# Patient Record
Sex: MALE | Race: OTHER | HISPANIC OR LATINO | URBAN - METROPOLITAN AREA
[De-identification: names, ages, dates, MRNs, and addresses within clinical notes are randomized per-mention and may not be internally consistent; named-entity substitution may affect disease eponyms.]

---

## 2020-02-11 ENCOUNTER — EMERGENCY (EMERGENCY)
Facility: HOSPITAL | Age: 40
LOS: 1 days | Discharge: HOME | End: 2020-02-11
Attending: EMERGENCY MEDICINE | Admitting: EMERGENCY MEDICINE
Payer: SELF-PAY

## 2020-02-11 VITALS
RESPIRATION RATE: 16 BRPM | DIASTOLIC BLOOD PRESSURE: 75 MMHG | TEMPERATURE: 99 F | OXYGEN SATURATION: 96 % | HEART RATE: 87 BPM | SYSTOLIC BLOOD PRESSURE: 115 MMHG

## 2020-02-11 VITALS
SYSTOLIC BLOOD PRESSURE: 126 MMHG | RESPIRATION RATE: 18 BRPM | DIASTOLIC BLOOD PRESSURE: 80 MMHG | TEMPERATURE: 98 F | OXYGEN SATURATION: 99 % | HEART RATE: 76 BPM

## 2020-02-11 PROCEDURE — 99284 EMERGENCY DEPT VISIT MOD MDM: CPT

## 2020-02-11 PROCEDURE — 73080 X-RAY EXAM OF ELBOW: CPT | Mod: 26,RT

## 2020-02-11 PROCEDURE — 99053 MED SERV 10PM-8AM 24 HR FAC: CPT

## 2020-02-11 PROCEDURE — 70450 CT HEAD/BRAIN W/O DYE: CPT | Mod: 26

## 2020-02-11 RX ORDER — NICOTINE POLACRILEX 2 MG
1 GUM BUCCAL DAILY
Refills: 0 | Status: DISCONTINUED | OUTPATIENT
Start: 2020-02-11 | End: 2020-02-17

## 2020-02-11 RX ADMIN — Medication 1 PATCH: at 03:10
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
## 2020-02-11 NOTE — ED PROVIDER NOTE - CARE PLAN
Principal Discharge DX:	Closed head injury, initial encounter  Secondary Diagnosis:	Contusion of right elbow, initial encounter
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
## 2020-02-11 NOTE — ED PROVIDER NOTE - CLINICAL SUMMARY MEDICAL DECISION MAKING FREE TEXT BOX
pt with blunt trauma.  Reports drinking ETOH tonight.  Between that and the mechanism will get imaging of the head as well as the elbow.  Will provide a nicotine patch as well.  Will hold NSAIDs for pain until CT is completed.  If all negative will dispo back into the custody of the [facility].
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
## 2020-02-11 NOTE — ED PROVIDER NOTE - PHYSICAL EXAMINATION
Gen: Well appearing in NAD  Head: NC - there are a number of large contusions to the frontal head on the L as well as L occiput  Eyes: PERRL  Neck: trachea midline  Resp:  No distress  Ext: no deformities, R elbow is TTP with full ROM and good distal PMS  Neuro:  A&O appears non focal  Skin:  Warm and dry as visualized  Psych:  Normal affect and mood

## 2020-02-11 NOTE — ED PROVIDER NOTE - PATIENT PORTAL LINK FT
You can access the FollowMyHealth Patient Portal offered by [facility] by registering at the following website: [email or web address]. By joining [employer]’s FollowMyHealth portal, you will also be able to view your health information using other applications (apps) compatible with our system.
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
## 2020-05-20 ENCOUNTER — EMERGENCY (EMERGENCY)
Facility: HOSPITAL | Age: 40
LOS: 1 days | Discharge: HOME | End: 2020-05-20
Attending: EMERGENCY MEDICINE | Admitting: EMERGENCY MEDICINE
Payer: SELF-PAY

## 2020-05-20 VITALS
SYSTOLIC BLOOD PRESSURE: 148 MMHG | HEART RATE: 89 BPM | OXYGEN SATURATION: 100 % | DIASTOLIC BLOOD PRESSURE: 94 MMHG | TEMPERATURE: 99 F | RESPIRATION RATE: 16 BRPM

## 2020-05-20 PROBLEM — Z78.9 OTHER SPECIFIED HEALTH STATUS: Chronic | Status: ACTIVE | Noted: 2020-02-11

## 2020-05-20 LAB
ALBUMIN SERPL ELPH-MCNC: 4.1 G/DL — SIGNIFICANT CHANGE UP (ref 3.3–5)
ALP SERPL-CCNC: 156 U/L — HIGH (ref 40–120)
ALT FLD-CCNC: 68 U/L — HIGH (ref 4–41)
ANION GAP SERPL CALC-SCNC: 13 MMO/L — SIGNIFICANT CHANGE UP (ref 7–14)
APPEARANCE UR: CLEAR — SIGNIFICANT CHANGE UP
APTT BLD: 33.4 SEC — SIGNIFICANT CHANGE UP (ref 27.5–36.3)
AST SERPL-CCNC: 122 U/L — HIGH (ref 4–40)
BASE EXCESS BLDV CALC-SCNC: 3.3 MMOL/L — SIGNIFICANT CHANGE UP
BASOPHILS # BLD AUTO: 0.07 K/UL — SIGNIFICANT CHANGE UP (ref 0–0.2)
BASOPHILS NFR BLD AUTO: 1.3 % — SIGNIFICANT CHANGE UP (ref 0–2)
BILIRUB SERPL-MCNC: 0.4 MG/DL — SIGNIFICANT CHANGE UP (ref 0.2–1.2)
BILIRUB UR-MCNC: NEGATIVE — SIGNIFICANT CHANGE UP
BLOOD GAS VENOUS - CREATININE: 0.7 MG/DL — SIGNIFICANT CHANGE UP (ref 0.5–1.3)
BLOOD UR QL VISUAL: NEGATIVE — SIGNIFICANT CHANGE UP
BUN SERPL-MCNC: 9 MG/DL — SIGNIFICANT CHANGE UP (ref 7–23)
CALCIUM SERPL-MCNC: 9.2 MG/DL — SIGNIFICANT CHANGE UP (ref 8.4–10.5)
CHLORIDE BLDV-SCNC: 105 MMOL/L — SIGNIFICANT CHANGE UP (ref 96–108)
CHLORIDE SERPL-SCNC: 102 MMOL/L — SIGNIFICANT CHANGE UP (ref 98–107)
CO2 SERPL-SCNC: 26 MMOL/L — SIGNIFICANT CHANGE UP (ref 22–31)
COLOR SPEC: COLORLESS — SIGNIFICANT CHANGE UP
CREAT SERPL-MCNC: 0.69 MG/DL — SIGNIFICANT CHANGE UP (ref 0.5–1.3)
EOSINOPHIL # BLD AUTO: 0.06 K/UL — SIGNIFICANT CHANGE UP (ref 0–0.5)
EOSINOPHIL NFR BLD AUTO: 1.1 % — SIGNIFICANT CHANGE UP (ref 0–6)
GAS PNL BLDV: 141 MMOL/L — SIGNIFICANT CHANGE UP (ref 136–146)
GLUCOSE BLDV-MCNC: 87 MG/DL — SIGNIFICANT CHANGE UP (ref 70–99)
GLUCOSE SERPL-MCNC: 91 MG/DL — SIGNIFICANT CHANGE UP (ref 70–99)
GLUCOSE UR-MCNC: NEGATIVE — SIGNIFICANT CHANGE UP
HCO3 BLDV-SCNC: 26 MMOL/L — SIGNIFICANT CHANGE UP (ref 20–27)
HCT VFR BLD CALC: 34.7 % — LOW (ref 39–50)
HCT VFR BLDV CALC: 36.6 % — LOW (ref 39–51)
HGB BLD-MCNC: 11.7 G/DL — LOW (ref 13–17)
HGB BLDV-MCNC: 11.9 G/DL — LOW (ref 13–17)
IMM GRANULOCYTES NFR BLD AUTO: 1.8 % — HIGH (ref 0–1.5)
INR BLD: 0.93 — SIGNIFICANT CHANGE UP (ref 0.88–1.17)
KETONES UR-MCNC: NEGATIVE — SIGNIFICANT CHANGE UP
LACTATE BLDV-MCNC: 1.8 MMOL/L — SIGNIFICANT CHANGE UP (ref 0.5–2)
LEUKOCYTE ESTERASE UR-ACNC: NEGATIVE — SIGNIFICANT CHANGE UP
LYMPHOCYTES # BLD AUTO: 0.95 K/UL — LOW (ref 1–3.3)
LYMPHOCYTES # BLD AUTO: 17.4 % — SIGNIFICANT CHANGE UP (ref 13–44)
MCHC RBC-ENTMCNC: 33.7 % — SIGNIFICANT CHANGE UP (ref 32–36)
MCHC RBC-ENTMCNC: 34.2 PG — HIGH (ref 27–34)
MCV RBC AUTO: 101.5 FL — HIGH (ref 80–100)
MONOCYTES # BLD AUTO: 0.89 K/UL — SIGNIFICANT CHANGE UP (ref 0–0.9)
MONOCYTES NFR BLD AUTO: 16.3 % — HIGH (ref 2–14)
NEUTROPHILS # BLD AUTO: 3.38 K/UL — SIGNIFICANT CHANGE UP (ref 1.8–7.4)
NEUTROPHILS NFR BLD AUTO: 62.1 % — SIGNIFICANT CHANGE UP (ref 43–77)
NITRITE UR-MCNC: NEGATIVE — SIGNIFICANT CHANGE UP
NRBC # FLD: 0 K/UL — SIGNIFICANT CHANGE UP (ref 0–0)
PCO2 BLDV: 51 MMHG — SIGNIFICANT CHANGE UP (ref 41–51)
PH BLDV: 7.36 PH — SIGNIFICANT CHANGE UP (ref 7.32–7.43)
PH UR: 6.5 — SIGNIFICANT CHANGE UP (ref 5–8)
PLATELET # BLD AUTO: 186 K/UL — SIGNIFICANT CHANGE UP (ref 150–400)
PMV BLD: 9.4 FL — SIGNIFICANT CHANGE UP (ref 7–13)
PO2 BLDV: 43 MMHG — HIGH (ref 35–40)
POTASSIUM BLDV-SCNC: 3.7 MMOL/L — SIGNIFICANT CHANGE UP (ref 3.4–4.5)
POTASSIUM SERPL-MCNC: 3.8 MMOL/L — SIGNIFICANT CHANGE UP (ref 3.5–5.3)
POTASSIUM SERPL-SCNC: 3.8 MMOL/L — SIGNIFICANT CHANGE UP (ref 3.5–5.3)
PROT SERPL-MCNC: 7.5 G/DL — SIGNIFICANT CHANGE UP (ref 6–8.3)
PROT UR-MCNC: NEGATIVE — SIGNIFICANT CHANGE UP
PROTHROM AB SERPL-ACNC: 10.7 SEC — SIGNIFICANT CHANGE UP (ref 9.8–13.1)
RBC # BLD: 3.42 M/UL — LOW (ref 4.2–5.8)
RBC # FLD: 15 % — HIGH (ref 10.3–14.5)
SAO2 % BLDV: 73.3 % — SIGNIFICANT CHANGE UP (ref 60–85)
SODIUM SERPL-SCNC: 141 MMOL/L — SIGNIFICANT CHANGE UP (ref 135–145)
SP GR SPEC: 1.01 — SIGNIFICANT CHANGE UP (ref 1–1.04)
UROBILINOGEN FLD QL: NORMAL — SIGNIFICANT CHANGE UP
WBC # BLD: 5.45 K/UL — SIGNIFICANT CHANGE UP (ref 3.8–10.5)
WBC # FLD AUTO: 5.45 K/UL — SIGNIFICANT CHANGE UP (ref 3.8–10.5)

## 2020-05-20 PROCEDURE — 73590 X-RAY EXAM OF LOWER LEG: CPT | Mod: 26,50

## 2020-05-20 PROCEDURE — 99053 MED SERV 10PM-8AM 24 HR FAC: CPT

## 2020-05-20 PROCEDURE — 93010 ELECTROCARDIOGRAM REPORT: CPT

## 2020-05-20 PROCEDURE — 99285 EMERGENCY DEPT VISIT HI MDM: CPT | Mod: 25

## 2020-05-20 PROCEDURE — 93970 EXTREMITY STUDY: CPT | Mod: 26

## 2020-05-20 RX ORDER — SODIUM CHLORIDE 9 MG/ML
1000 INJECTION INTRAMUSCULAR; INTRAVENOUS; SUBCUTANEOUS ONCE
Refills: 0 | Status: COMPLETED | OUTPATIENT
Start: 2020-05-20 | End: 2020-05-20

## 2020-05-20 RX ORDER — KETOROLAC TROMETHAMINE 30 MG/ML
15 SYRINGE (ML) INJECTION ONCE
Refills: 0 | Status: DISCONTINUED | OUTPATIENT
Start: 2020-05-20 | End: 2020-05-20

## 2020-05-20 RX ADMIN — SODIUM CHLORIDE 1000 MILLILITER(S): 9 INJECTION INTRAMUSCULAR; INTRAVENOUS; SUBCUTANEOUS at 09:37

## 2020-05-20 RX ADMIN — Medication 15 MILLIGRAM(S): at 11:28

## 2020-05-20 RX ADMIN — Medication 100 MILLIGRAM(S): at 08:43

## 2020-05-20 NOTE — ED PROVIDER NOTE - NS ED ROS FT
CONSTITUTIONAL: No fevers, chills, fatigue, dizziness, lightheadedness weakness, unexpected weight change  HEENT: No loss of vision, double vision, blurry vision, nasal congestion, runny nose, sore throat  CV: No chest pain, palpitations  PULM: No cough, shortness of breath  GI: No abdominal pain, nausea, vomiting, diarrhea, constipation, bloody stools  SKIN: L/E SWELLING TO CALVES. No rashes  MSK: No muscle aches, joint pains  NEURO: PINS AND NEEDLES LE B/L  PSYCHIATRIC: Denies suicidal, homicidal ideations. No auditory, visual, tactile hallucinations.

## 2020-05-20 NOTE — ED PROVIDER NOTE - PATIENT PORTAL LINK FT
You can access the FollowMyHealth Patient Portal offered by [facility] by registering at the following website: [email or web address]. By joining [employer]’s FollowMyHealth portal, you will also be able to view your health information using other applications (apps) compatible with our system.

## 2020-05-20 NOTE — ED PROVIDER NOTE - CLINICAL SUMMARY MEDICAL DECISION MAKING FREE TEXT BOX
39 y.o. male undomiciled here for bilateral LE swelling with pins and needle sensation. Also, small pimple to chest wall. Vitals wnl. Exam with bilateral LE swelling, L > R with some yellowing of skin, red dots/bite marks?, no ulcers. No pain to passive or active flexion, nontender to palpation, intact pulses and sensation, patient able to ambulate, bear weight, FROM. Concern for DVT vs infection. Lower suspicion for compartment syndrome given exam. Possible swelling 2/2 exertion or renal/hepatic failure. Will check labs including lactate, cpk, get US LE, reassess. 39 y.o. male undomiciled here for bilateral LE swelling with pins and needle sensation. Also, 1x1cm nonfluctant, slightly raised papular mass on chest wall. Vitals wnl. Exam with bilateral LE swelling, L > R with some yellowing of skin, red dots/bite marks?, no ulcers. No pain to passive or active flexion, nontender to palpation, intact pulses and sensation, patient able to ambulate, bear weight, FROM. Concern for DVT vs infection. Lower suspicion for compartment syndrome given exam. Possible swelling 2/2 exertion or renal/hepatic failure. Will check labs including lactate, cpk, get US LE, reassess.

## 2020-05-20 NOTE — ED ADULT TRIAGE NOTE - CHIEF COMPLAINT QUOTE
Pt brought in for reported bilateral LE swelling that he noticed x2 days ago and also c/o a lump to mid chest causing him pain. Pt appears in no acute distress, vs as noted and pt ambulatory to triage. EKG to be completed.

## 2020-05-20 NOTE — ED PROVIDER NOTE - PROGRESS NOTE DETAILS
Brain Reid D.O., PGY1 (Resident)  Labs reassuring. Slightly elevated CPK. Will hydrate. No lactate, or elevated WBC. Patient afebrile. LFTs suggest an alcoholic hepatitis pattern. Patient w/o tenderness on abdominal exam. On rexamination of legs, physical exam unchanged. Intact pulses (femoral, popliteal, DP, PT 2+ bilateral, intact and symmetric sensation bilateral LE, no tenderness to palpation, no pain with passive or active flexion, skin well perfused. Pending UA to eval for myoglobin. If normal, it appears reasonable patient can follow up with telehealth/medicine clinic. US negative for above the knee DVT. Gastroc veins seen and are patent. At present time, it appears swelling is 2/2 prolonged walking. Less likely DVT, compartment syndrome. Based on overall picture, infection of LE seems unlikely. Will continue to monitor. Cabot: On reexam, the patient denies current tingling except to the soles of his feet, and sensation over the rest of the LEs is normal.  There is still no pain with passive flexion.  The calves feel more supple after the patient has been lying in bed.  Will check UA for myoglobin, but anticipate discharge with recommendation to elevate legs as much as possible for the next several days.

## 2020-05-20 NOTE — ED PROVIDER NOTE - PHYSICAL EXAMINATION
GENERAL: middle aged male, poor hygiene, ripped clothing, Vital signs are within normal limits  HEENT: NC/AT, conjunctiva noninjected and sclera anicteric, moist mucous membranes,  NECK: Supple, trachea midline  LUNG: CTAB, no w/r/r appreciated  CV: RRR, no m/r/g appreciated, Pulses- Radial: 2+ b/l; posterior tibial: 2+ b/l; dorsalis pedis: 2+ b/l  ABDOMEN: Soft, NTND, no rebound or guarding  BACK: No midline spinal tenderness or step-offs. No paraspinal tenderness to palpation  MSK: B/L LE EDEMA, PITTING 2+ (LEFT CALF SWELLING > RIGHT). No visible deformities, full range of motion UE/LE b/l, 5/5 muscle strength UE/LE b/l, nontender extremities  NEURO: AAOx4 (to person, place, time, event), sensation grossly intact, steady gait  SKIN: Warm, dry, well perfused. PSORIATIC RASH TO LEFT KNEE. 1x1cm pimple to chest wall. Red dots/bite marks? to left calf with some yellowing of skin.   PSYCH: Normal mood and affect GENERAL: middle aged male, poor hygiene, ripped clothing, Vital signs are within normal limits  HEENT: NC/AT, conjunctiva noninjected and sclera anicteric, moist mucous membranes,  NECK: Supple, trachea midline  LUNG: CTAB, no w/r/r appreciated  CV: RRR, no m/r/g appreciated, Pulses- Radial: 2+ b/l; posterior tibial: 2+ b/l; dorsalis pedis: 2+ b/l  ABDOMEN: Soft, NTND, no rebound or guarding  BACK: No midline spinal tenderness or step-offs. No paraspinal tenderness to palpation  MSK: B/L LE EDEMA, PITTING 2+ (LEFT CALF SWELLING > RIGHT). No visible deformities, full range of motion UE/LE b/l, 5/5 muscle strength UE/LE b/l, nontender extremities  NEURO: AAOx4 (to person, place, time, event), sensation grossly intact, steady gait  SKIN: Warm, dry, well perfused. PSORIATIC RASH TO LEFT KNEE. 1x1cm nonfluctant, slightly raised papular mass on chest wall. Red dots/bite marks? to left calf with some yellowing of skin.   PSYCH: Normal mood and affect

## 2020-05-20 NOTE — ED ADULT NURSE NOTE - OBJECTIVE STATEMENT
Pt reports a lot of walking over the past few days that has resulted in bilateral leg swelling and tightness. Pt also reports a "lump" to his chest that is painful.

## 2020-05-20 NOTE — ED PROVIDER NOTE - OBJECTIVE STATEMENT
39 y.o. male undomicied presenting to the ED for bilateral LE swelling and lump on the middle of his chest x2 days. Triage note states patient having chest pain but pain denies chest pain, SOB on my interview. Patient states lump feels more painful but he stretches his arms out to his sides. Denies bug bites or trauma. Denies fever, chills. Patient endorses he has been walking more than normal for the past 2 days. Endorses a pins and needle sensation on the bottom of his feet bilateral. Denies pain in the legs. No hx of liver, kidney disease, heart failure. Denies recent trauma to the LE. Denies any rashes. Current every day smoker, drinks 3, 12oz beers /day, denies IVDU. Smokes marijuana on occasion. 39 y.o. male undomiciled presenting to the ED for bilateral LE swelling and lump on the middle of his chest x2 days. Triage note states patient having chest pain but pain denies chest pain, SOB on my interview. Patient states lump feels more painful but he stretches his arms out to his sides. Denies bug bites or trauma. Denies fever, chills. Patient endorses he has been walking more than normal for the past 2 days. Endorses a pins and needle sensation on the bottom of his feet bilateral. Denies pain in the legs. No hx of liver, kidney disease, heart failure. Denies recent trauma to the LE. Denies any rashes. Current every day smoker, drinks 3, 12oz beers /day, denies IVDU. Smokes marijuana on occasion.

## 2020-05-20 NOTE — ED PROVIDER NOTE - ATTENDING CONTRIBUTION TO CARE
I, Jennifer Cabot, MD, have performed a history and physical exam of the patient and discussed their management with the resident. I reviewed the resident's note and agree with the documented findings and plan of care. My medical decision making and observations are found above.    Cabot: 39M with no known PMH other than ETOH abuse, undomiciled, here with 2d of BLE edema and tingling after ambulating all day and night.  He also reports a pimple to his sternum.  No F/C/N/V/D/urinary sx/CP/SOB/cough.  No prior PE/DVT, immob, cancers, travel in a car or plane.  On exam, HDS, NAD, MMM, eyes clear, lungs CTAB, heart sounds normal, abd soft, NT, ND, no CVAT, BLE taut, warm, edematous L>R, + 2+ DP pulses bilat, no pain with passive flexion, LLE with petechial rash up the shin area, no wounds to feet, no crepitus, neuro: alert and oriented, no focal deficits, SILT, full strength of BLE.  Likely dependent edema vs rhabdo.  Unlikely compartment syndrome given mild pain in general, no pain with passive stretch, full strength of BLE, no pallor.  Also unlikely DVT or necrotizing fascitis.  WIll monitor closely for change in symptoms while we check basic labs, CPK, LA, XR, doppler US. I, Jennifer Cabot, MD, have performed a history and physical exam of the patient and discussed their management with the resident. I reviewed the resident's note and agree with the documented findings and plan of care. My medical decision making and observations are found above.    Cabot: 39M with no known PMH other than ETOH abuse, undomiciled, here with 2d of BLE edema and tingling after ambulating all day and night.  He also reports a pimple to his sternum.  No F/C/N/V/D/urinary sx/CP/SOB/cough.  No prior PE/DVT, immob, cancers, travel in a car or plane.  On exam, HDS, NAD, MMM, eyes clear, lungs CTAB, heart sounds normal, abd soft, NT, ND, no CVAT, BLE taut, warm, edematous L>R, + 2+ DP pulses bilat, no pain with passive flexion, LLE with petechial rash up the shin area, no wounds to feet, no crepitus, + 1x1cm indurated area over sterum without fluctuance or discharge, neuro: alert and oriented, no focal deficits, SILT, full strength of BLE.  Likely dependent edema vs rhabdo.  Unlikely compartment syndrome given mild pain in general, no pain with passive stretch, full strength of BLE, no pallor.  Also unlikely DVT or necrotizing fascitis.  WIll monitor closely for change in symptoms while we check basic labs, CPK, LA, XR, doppler US.

## 2020-05-20 NOTE — ED PROVIDER NOTE - NSFOLLOWUPINSTRUCTIONS_ED_ALL_ED_FT
YOU WERE SEEN IN THE ED FOR: LOWER EXTREMITY SWELLING    KEEP YOUR FEET ELEVATED AND REST. TRY TO AVOID EXERTING YOURSELF TO ALLOW THE SWELLING TO HEAL AND GO DOWN.    FOR PAIN/FEVER, YOU MAY TAKE TYLENOL (acetaminophen) AND/OR IBUPROFEN (advil or motrin). FOLLOW THE INSTRUCTIONS ON THE LABEL/CONTAINER.    PLEASE FOLLOW UP WITH YOUR PRIVATE PHYSICIAN WITHIN THE NEXT 72 HOURS. BRING COPIES OF YOUR RESULTS.  -If you do not have a PMD, please call [phone] to find one convenient for you or call our clinic at [phone].    THE  SPOKE TO YOU ABOUT HOUSING.    RETURN TO THE EMERGENCY DEPARTMENT IF YOU EXPERIENCE ANY NEW/CONCERNING/WORSENING SYMPTOMS.  -worsening pain  -redness to the legs  -worsening swelling  -change in sensation to legs  -inability to walk

## 2020-05-28 ENCOUNTER — EMERGENCY (EMERGENCY)
Facility: HOSPITAL | Age: 40
LOS: 0 days | Discharge: HOME | End: 2020-05-28
Attending: EMERGENCY MEDICINE
Payer: MEDICAID

## 2020-05-28 VITALS
RESPIRATION RATE: 17 BRPM | HEART RATE: 88 BPM | WEIGHT: 169.98 LBS | OXYGEN SATURATION: 96 % | DIASTOLIC BLOOD PRESSURE: 58 MMHG | HEIGHT: 68 IN | TEMPERATURE: 97 F | SYSTOLIC BLOOD PRESSURE: 100 MMHG

## 2020-05-28 VITALS
RESPIRATION RATE: 18 BRPM | HEART RATE: 89 BPM | TEMPERATURE: 98 F | SYSTOLIC BLOOD PRESSURE: 106 MMHG | OXYGEN SATURATION: 100 % | DIASTOLIC BLOOD PRESSURE: 71 MMHG

## 2020-05-28 DIAGNOSIS — E87.6 HYPOKALEMIA: ICD-10-CM

## 2020-05-28 DIAGNOSIS — F10.129 ALCOHOL ABUSE WITH INTOXICATION, UNSPECIFIED: ICD-10-CM

## 2020-05-28 LAB
ALBUMIN SERPL ELPH-MCNC: 3.3 G/DL — SIGNIFICANT CHANGE UP (ref 3.3–5)
ALP SERPL-CCNC: 135 U/L — HIGH (ref 40–120)
ALT FLD-CCNC: 73 U/L — SIGNIFICANT CHANGE UP (ref 12–78)
ANION GAP SERPL CALC-SCNC: 10 MMOL/L — SIGNIFICANT CHANGE UP (ref 5–17)
ANISOCYTOSIS BLD QL: SLIGHT — SIGNIFICANT CHANGE UP
AST SERPL-CCNC: 73 U/L — HIGH (ref 15–37)
BASOPHILS # BLD AUTO: 0 K/UL — SIGNIFICANT CHANGE UP (ref 0–0.2)
BASOPHILS NFR BLD AUTO: 0 % — SIGNIFICANT CHANGE UP (ref 0–2)
BILIRUB SERPL-MCNC: 0.2 MG/DL — SIGNIFICANT CHANGE UP (ref 0.2–1.2)
BUN SERPL-MCNC: 6 MG/DL — LOW (ref 7–23)
CALCIUM SERPL-MCNC: 8.2 MG/DL — LOW (ref 8.5–10.1)
CHLORIDE SERPL-SCNC: 112 MMOL/L — HIGH (ref 96–108)
CO2 SERPL-SCNC: 25 MMOL/L — SIGNIFICANT CHANGE UP (ref 22–31)
CREAT SERPL-MCNC: 0.69 MG/DL — SIGNIFICANT CHANGE UP (ref 0.5–1.3)
EOSINOPHIL # BLD AUTO: 0 K/UL — SIGNIFICANT CHANGE UP (ref 0–0.5)
EOSINOPHIL NFR BLD AUTO: 0 % — SIGNIFICANT CHANGE UP (ref 0–6)
ETHANOL SERPL-MCNC: 375 MG/DL — HIGH (ref 0–10)
GLUCOSE BLDC GLUCOMTR-MCNC: 102 MG/DL — HIGH (ref 70–99)
GLUCOSE SERPL-MCNC: 94 MG/DL — SIGNIFICANT CHANGE UP (ref 70–99)
HCT VFR BLD CALC: 35.6 % — LOW (ref 39–50)
HGB BLD-MCNC: 11.8 G/DL — LOW (ref 13–17)
LYMPHOCYTES # BLD AUTO: 1.62 K/UL — SIGNIFICANT CHANGE UP (ref 1–3.3)
LYMPHOCYTES # BLD AUTO: 30 % — SIGNIFICANT CHANGE UP (ref 13–44)
MACROCYTES BLD QL: SLIGHT — SIGNIFICANT CHANGE UP
MAGNESIUM SERPL-MCNC: 2.5 MG/DL — SIGNIFICANT CHANGE UP (ref 1.6–2.6)
MANUAL SMEAR VERIFICATION: SIGNIFICANT CHANGE UP
MCHC RBC-ENTMCNC: 33.1 GM/DL — SIGNIFICANT CHANGE UP (ref 32–36)
MCHC RBC-ENTMCNC: 33.5 PG — SIGNIFICANT CHANGE UP (ref 27–34)
MCV RBC AUTO: 101.1 FL — HIGH (ref 80–100)
METAMYELOCYTES # FLD: 2 % — HIGH (ref 0–0)
MONOCYTES # BLD AUTO: 0.22 K/UL — SIGNIFICANT CHANGE UP (ref 0–0.9)
MONOCYTES NFR BLD AUTO: 4 % — SIGNIFICANT CHANGE UP (ref 2–14)
NEUTROPHILS # BLD AUTO: 3.46 K/UL — SIGNIFICANT CHANGE UP (ref 1.8–7.4)
NEUTROPHILS NFR BLD AUTO: 62 % — SIGNIFICANT CHANGE UP (ref 43–77)
NEUTS BAND # BLD: 2 % — SIGNIFICANT CHANGE UP (ref 0–8)
NRBC # BLD: 0 /100 — SIGNIFICANT CHANGE UP (ref 0–0)
NRBC # BLD: SIGNIFICANT CHANGE UP /100 WBCS (ref 0–0)
PLAT MORPH BLD: NORMAL — SIGNIFICANT CHANGE UP
PLATELET # BLD AUTO: 346 K/UL — SIGNIFICANT CHANGE UP (ref 150–400)
POTASSIUM SERPL-MCNC: 3.2 MMOL/L — LOW (ref 3.5–5.3)
POTASSIUM SERPL-SCNC: 3.2 MMOL/L — LOW (ref 3.5–5.3)
PROT SERPL-MCNC: 7.4 GM/DL — SIGNIFICANT CHANGE UP (ref 6–8.3)
RBC # BLD: 3.52 M/UL — LOW (ref 4.2–5.8)
RBC # FLD: 14.6 % — HIGH (ref 10.3–14.5)
RBC BLD AUTO: ABNORMAL
SODIUM SERPL-SCNC: 147 MMOL/L — HIGH (ref 135–145)
WBC # BLD: 5.4 K/UL — SIGNIFICANT CHANGE UP (ref 3.8–10.5)
WBC # FLD AUTO: 5.4 K/UL — SIGNIFICANT CHANGE UP (ref 3.8–10.5)

## 2020-05-28 RX ORDER — POTASSIUM CHLORIDE 20 MEQ
20 PACKET (EA) ORAL ONCE
Refills: 0 | Status: COMPLETED | OUTPATIENT
Start: 2020-05-28 | End: 2020-05-28

## 2020-05-28 RX ORDER — SODIUM CHLORIDE 9 MG/ML
1000 INJECTION INTRAMUSCULAR; INTRAVENOUS; SUBCUTANEOUS ONCE
Refills: 0 | Status: COMPLETED | OUTPATIENT
Start: 2020-05-28 | End: 2020-05-28

## 2020-05-28 RX ADMIN — Medication 50 MILLIEQUIVALENT(S): at 03:30

## 2020-05-28 RX ADMIN — SODIUM CHLORIDE 1000 MILLILITER(S): 9 INJECTION INTRAMUSCULAR; INTRAVENOUS; SUBCUTANEOUS at 03:00

## 2020-05-28 RX ADMIN — SODIUM CHLORIDE 1000 MILLILITER(S): 9 INJECTION INTRAMUSCULAR; INTRAVENOUS; SUBCUTANEOUS at 01:59

## 2020-05-28 NOTE — ED PROVIDER NOTE - NS ED ROS FT
No fever/chills, No photophobia/eye pain/changes in vision, No ear pain/sore throat/dysphagia, No chest pain/palpitations, no SOB/cough/wheeze/stridor, No abdominal pain, No N/V/D, no dysuria/frequency/discharge, No neck/back pain, no rash, no changes in neurological status/function. presumed etoh

## 2020-05-28 NOTE — ED ADULT NURSE NOTE - NSIMPLEMENTINTERV_GEN_ALL_ED
Implemented All Fall with Harm Risk Interventions:  [place] to call system. Call bell, personal items and telephone within reach. Instruct patient to call for assistance. Room bathroom lighting operational. Non-slip footwear when patient is off stretcher. Physically safe environment: no spills, clutter or unnecessary equipment. Stretcher in lowest position, wheels locked, appropriate side rails in place. Provide visual cue, wrist band, yellow gown, etc. Monitor gait and stability. Monitor for mental status changes and reorient to person, place, and time. Review medications for side effects contributing to fall risk. Reinforce activity limits and safety measures with patient and family. Provide visual clues: red socks.
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
## 2020-05-28 NOTE — ED PROVIDER NOTE - CARE PLAN
Principal Discharge DX:	Alcoholic intoxication without complication  Secondary Diagnosis:	Hypokalemia

## 2020-05-28 NOTE — ED PROVIDER NOTE - PHYSICAL EXAMINATION
Gen:s luggish, NAD, well appearing  Head: NC, AT, PERRL, EOMI, normal lids/conjunctiva  ENT: dry mucus membranes  Neck: +supple, +Trachea midline  Pulm: Bilateral BS, normal resp effort, no wheeze/stridor/retractions  CV: RRR, no M/R/G, +dist pulses  Abd: soft, NT/ND, Negative Bennett signs, +BS, no palpable masses  Mskel: no edema/erythema/cyanosis  Skin: no rash, warm/dry  Neuro: Unresponsive, no apparent sensory/motor deficits, coordination intact

## 2020-05-28 NOTE — ED PROVIDER NOTE - PATIENT PORTAL LINK FT
You can access the FollowMyHealth Patient Portal offered by [facility] by registering at the following website: [email or web address]. By joining [employer]’s FollowMyHealth portal, you will also be able to view your health information using other applications (apps) compatible with our system.
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
## 2020-06-28 ENCOUNTER — EMERGENCY (EMERGENCY)
Facility: HOSPITAL | Age: 40
LOS: 0 days | Discharge: HOME | End: 2020-06-28
Attending: STUDENT IN AN ORGANIZED HEALTH CARE EDUCATION/TRAINING PROGRAM
Payer: MEDICAID

## 2020-06-28 VITALS
OXYGEN SATURATION: 98 % | RESPIRATION RATE: 16 BRPM | SYSTOLIC BLOOD PRESSURE: 110 MMHG | TEMPERATURE: 98 F | HEART RATE: 87 BPM | DIASTOLIC BLOOD PRESSURE: 71 MMHG

## 2020-06-28 VITALS
DIASTOLIC BLOOD PRESSURE: 73 MMHG | WEIGHT: 149.91 LBS | HEART RATE: 110 BPM | HEIGHT: 69 IN | TEMPERATURE: 98 F | OXYGEN SATURATION: 96 % | RESPIRATION RATE: 18 BRPM | SYSTOLIC BLOOD PRESSURE: 113 MMHG

## 2020-06-28 DIAGNOSIS — F10.129 ALCOHOL ABUSE WITH INTOXICATION, UNSPECIFIED: ICD-10-CM

## 2020-06-28 PROBLEM — Z78.9 OTHER SPECIFIED HEALTH STATUS: Chronic | Status: ACTIVE | Noted: 2020-05-28

## 2020-06-28 LAB — GLUCOSE BLDC GLUCOMTR-MCNC: 96 MG/DL — SIGNIFICANT CHANGE UP (ref 70–99)

## 2020-06-28 NOTE — ED PROVIDER NOTE - PATIENT PORTAL LINK FT
You can access the FollowMyHealth Patient Portal offered by [facility] by registering at the following website: [email or web address]. By joining [employer]’s FollowMyHealth portal, you will also be able to view your health information using other applications (apps) compatible with our system.

## 2020-06-28 NOTE — ED ADULT NURSE NOTE - OBJECTIVE STATEMENT
Received patient in triage. BIBA,  intox,  found sitting on a lawn [place] the side of [place] by [facility].  daily beer drinker.  last drink about 11pm. Alert and speaking clearly.

## 2020-06-28 NOTE — ED PROVIDER NOTE - PHYSICAL EXAMINATION
VITAL SIGNS: I have reviewed nursing notes and confirm.   GEN: unkept, disheveled; in no acute distress. Speaking full sentences. Appearing intoxicated, but answering questions appropriately.  SKIN: Warm, no rash, no diaphoresis, no cyanosis, well perfused.   HEAD: Normocephalic; atraumatic. no scalp hematoma/lacerations.  NECK: Supple; non tender.   EYES: PERRL/EOMI 3mm reactive, conjunctiva and sclera clear.   ENT: No nasal discharge; airway clear.   CV: S1, S2 normal; no murmurs, gallops, or rubs. RRR. No LE pitting edema B/L. Cap refill < 2sec throughout. Distal pulses intact 2+ throughout.  RESP: CTA B/L, No wheezes, rales or rhonchi.   ABD: Normal bowel sounds; soft; ND; NT, no hepatosplenomegaly.   EXT: Normal ROM and moving all 4 x extremities. No joint or muscular pain throughout. No clubbing.  NEURO: Alert, oriented x 3 Grossly unremarkable. Sensory and motor intact throughout. No focal deficits. normal speech and coordination.   PSYCH: Cooperative, appropriate.

## 2020-06-28 NOTE — ED PROVIDER NOTE - OBJECTIVE STATEMENT
40M PMHx of ETOH use (daily, no hx of w/d seizures, no hx of DTs), homelessness presenting with ethanol intoxication. Drank 8 beers, today, last drink at 10PM. [place]. Denies any trauma, headaches, n/v, chest pain, sob, neck pain, abdominal pain, diarrhea/constipation, drug use, SI or HI. Reports he lost his home 4 months ago. Follows up at [facility] system for social work for assistance for shelter, however was interrupted by COVID-19 pandemic. Does not wish to receive assistance for a shelter from social work.

## 2020-06-28 NOTE — ED ADULT NURSE NOTE - CHIEF COMPLAINT QUOTE
BIBA,  intox,  found sitting [place] by [facility].  daily beer drinker.  last drink about 11pm

## 2020-06-28 NOTE — ED PROVIDER NOTE - CLINICAL SUMMARY MEDICAL DECISION MAKING FREE TEXT BOX
intoxicated, homelessness. FSG normal. A&Ox3  - No further diagnostics.  - Food/drink  - Shelter until AM, re-eval for clinical sobriety.

## 2020-06-28 NOTE — ED PROVIDER NOTE - NSFOLLOWUPINSTRUCTIONS_ED_ALL_ED_FT
Return to the emergency room if you experience worsening symptoms, fevers, vomiting, chest pain, headaches, difficulty breathing, abdominal pain, or new concerning symptoms.    Follow up with the [facility] system for further evaluation of your social situation.

## 2020-06-28 NOTE — ED ADULT TRIAGE NOTE - CHIEF COMPLAINT QUOTE
BIBA,  intox,  found sitting [place] a [place] by [facility].  daily beer drinker.  last drink about 11pm Hpi Title: Evaluation of Skin Lesions How Severe Are Your Spot(S)?: mild Have Your Spot(S) Been Treated In The Past?: has not been treated

## 2020-06-28 NOTE — ED PROVIDER NOTE - PROGRESS NOTE DETAILS
Patient is resting comfortably in stretcher, no complaints. Offered social work but does not want to go to a shelter. Patient agrees to stay until AM, will re-eval for clinical sobriety. The patient is clinically sober. The patient is alert and oriented x 3, is clear and coherent in conversation and has a normal gait and shows no signs of acute intoxication. The patient is safe for discharge.

## 2022-05-25 NOTE — ED ADULT NURSE NOTE - ALCOHOL PRE SCREEN (AUDIT - C)
Statement Selected Perilesional Excision Additional Text (Leave Blank If You Do Not Want): The margin was drawn around the clinically apparent lesion. Incisions were then made along these lines to the appropriate tissue plane and the lesion was extirpated.

## 2023-02-01 ENCOUNTER — INPATIENT (INPATIENT)
Facility: HOSPITAL | Age: 43
LOS: 1 days | Discharge: HOME | End: 2023-02-03
Attending: GENERAL ACUTE CARE HOSPITAL | Admitting: GENERAL ACUTE CARE HOSPITAL
Payer: MEDICAID

## 2023-02-01 VITALS
TEMPERATURE: 100 F | HEART RATE: 123 BPM | WEIGHT: 139.99 LBS | DIASTOLIC BLOOD PRESSURE: 81 MMHG | HEIGHT: 71 IN | RESPIRATION RATE: 14 BRPM | SYSTOLIC BLOOD PRESSURE: 134 MMHG | OXYGEN SATURATION: 98 %

## 2023-02-01 DIAGNOSIS — R26.81 UNSTEADINESS ON FEET: ICD-10-CM

## 2023-02-01 DIAGNOSIS — E86.0 DEHYDRATION: ICD-10-CM

## 2023-02-01 DIAGNOSIS — F10.939 ALCOHOL USE, UNSPECIFIED WITH WITHDRAWAL, UNSPECIFIED: ICD-10-CM

## 2023-02-01 DIAGNOSIS — N39.0 URINARY TRACT INFECTION, SITE NOT SPECIFIED: ICD-10-CM

## 2023-02-01 DIAGNOSIS — E87.8 OTHER DISORDERS OF ELECTROLYTE AND FLUID BALANCE, NOT ELSEWHERE CLASSIFIED: ICD-10-CM

## 2023-02-01 LAB
ALBUMIN SERPL ELPH-MCNC: 3.8 G/DL — SIGNIFICANT CHANGE UP (ref 3.3–5)
ALP SERPL-CCNC: 118 U/L — SIGNIFICANT CHANGE UP (ref 40–120)
ALT FLD-CCNC: 53 U/L — SIGNIFICANT CHANGE UP (ref 12–78)
AMPHET UR-MCNC: NEGATIVE — SIGNIFICANT CHANGE UP
ANION GAP SERPL CALC-SCNC: 15 MMOL/L — SIGNIFICANT CHANGE UP (ref 5–17)
APPEARANCE UR: ABNORMAL
APTT BLD: 29.5 SEC — SIGNIFICANT CHANGE UP (ref 27.5–35.5)
AST SERPL-CCNC: 105 U/L — HIGH (ref 15–37)
BACTERIA # UR AUTO: ABNORMAL
BARBITURATES UR SCN-MCNC: NEGATIVE — SIGNIFICANT CHANGE UP
BASOPHILS # BLD AUTO: 0.02 K/UL — SIGNIFICANT CHANGE UP (ref 0–0.2)
BASOPHILS NFR BLD AUTO: 0.4 % — SIGNIFICANT CHANGE UP (ref 0–2)
BENZODIAZ UR-MCNC: NEGATIVE — SIGNIFICANT CHANGE UP
BILIRUB SERPL-MCNC: 1.3 MG/DL — HIGH (ref 0.2–1.2)
BILIRUB UR-MCNC: ABNORMAL
BUN SERPL-MCNC: 14 MG/DL — SIGNIFICANT CHANGE UP (ref 7–23)
CALCIUM SERPL-MCNC: 9.3 MG/DL — SIGNIFICANT CHANGE UP (ref 8.5–10.1)
CHLORIDE SERPL-SCNC: 97 MMOL/L — SIGNIFICANT CHANGE UP (ref 96–108)
CO2 SERPL-SCNC: 24 MMOL/L — SIGNIFICANT CHANGE UP (ref 22–31)
COCAINE METAB.OTHER UR-MCNC: NEGATIVE — SIGNIFICANT CHANGE UP
COLOR SPEC: ABNORMAL
CREAT SERPL-MCNC: 0.99 MG/DL — SIGNIFICANT CHANGE UP (ref 0.5–1.3)
DIFF PNL FLD: ABNORMAL
EGFR: 96 ML/MIN/1.73M2 — SIGNIFICANT CHANGE UP
EOSINOPHIL # BLD AUTO: 0 K/UL — SIGNIFICANT CHANGE UP (ref 0–0.5)
EOSINOPHIL NFR BLD AUTO: 0 % — SIGNIFICANT CHANGE UP (ref 0–6)
EPI CELLS # UR: SIGNIFICANT CHANGE UP
ETHANOL SERPL-MCNC: <10 MG/DL — SIGNIFICANT CHANGE UP (ref 0–10)
FLUAV AG NPH QL: SIGNIFICANT CHANGE UP
FLUBV AG NPH QL: SIGNIFICANT CHANGE UP
GLUCOSE BLDC GLUCOMTR-MCNC: 145 MG/DL — HIGH (ref 70–99)
GLUCOSE SERPL-MCNC: 106 MG/DL — HIGH (ref 70–99)
GLUCOSE UR QL: NEGATIVE MG/DL — SIGNIFICANT CHANGE UP
HCT VFR BLD CALC: 39 % — SIGNIFICANT CHANGE UP (ref 39–50)
HGB BLD-MCNC: 13.2 G/DL — SIGNIFICANT CHANGE UP (ref 13–17)
IMM GRANULOCYTES NFR BLD AUTO: 0.4 % — SIGNIFICANT CHANGE UP (ref 0–0.9)
INR BLD: 1.05 RATIO — SIGNIFICANT CHANGE UP (ref 0.88–1.16)
KETONES UR-MCNC: ABNORMAL
LEUKOCYTE ESTERASE UR-ACNC: ABNORMAL
LYMPHOCYTES # BLD AUTO: 0.24 K/UL — LOW (ref 1–3.3)
LYMPHOCYTES # BLD AUTO: 4.5 % — LOW (ref 13–44)
MAGNESIUM SERPL-MCNC: 1.5 MG/DL — LOW (ref 1.6–2.6)
MANUAL SMEAR VERIFICATION: YES — SIGNIFICANT CHANGE UP
MCHC RBC-ENTMCNC: 33.1 PG — SIGNIFICANT CHANGE UP (ref 27–34)
MCHC RBC-ENTMCNC: 33.8 G/DL — SIGNIFICANT CHANGE UP (ref 32–36)
MCV RBC AUTO: 97.7 FL — SIGNIFICANT CHANGE UP (ref 80–100)
METHADONE UR-MCNC: NEGATIVE — SIGNIFICANT CHANGE UP
MONOCYTES # BLD AUTO: 0.49 K/UL — SIGNIFICANT CHANGE UP (ref 0–0.9)
MONOCYTES NFR BLD AUTO: 9.2 % — SIGNIFICANT CHANGE UP (ref 2–14)
NEUTROPHILS # BLD AUTO: 4.57 K/UL — SIGNIFICANT CHANGE UP (ref 1.8–7.4)
NEUTROPHILS NFR BLD AUTO: 85.5 % — HIGH (ref 43–77)
NITRITE UR-MCNC: NEGATIVE — SIGNIFICANT CHANGE UP
NRBC # BLD: 0 /100 WBCS — SIGNIFICANT CHANGE UP (ref 0–0)
OPIATES UR-MCNC: NEGATIVE — SIGNIFICANT CHANGE UP
PCP SPEC-MCNC: SIGNIFICANT CHANGE UP
PCP UR-MCNC: NEGATIVE — SIGNIFICANT CHANGE UP
PH UR: 5 — SIGNIFICANT CHANGE UP (ref 5–8)
PHOSPHATE SERPL-MCNC: 1.9 MG/DL — LOW (ref 2.5–4.5)
PLAT MORPH BLD: NORMAL — SIGNIFICANT CHANGE UP
PLATELET # BLD AUTO: 90 K/UL — LOW (ref 150–400)
POTASSIUM SERPL-MCNC: 3.4 MMOL/L — LOW (ref 3.5–5.3)
POTASSIUM SERPL-SCNC: 3.4 MMOL/L — LOW (ref 3.5–5.3)
PROT SERPL-MCNC: 8.2 GM/DL — SIGNIFICANT CHANGE UP (ref 6–8.3)
PROT UR-MCNC: 500 MG/DL
PROTHROM AB SERPL-ACNC: 12.6 SEC — SIGNIFICANT CHANGE UP (ref 10.5–13.4)
RBC # BLD: 3.99 M/UL — LOW (ref 4.2–5.8)
RBC # FLD: 13.7 % — SIGNIFICANT CHANGE UP (ref 10.3–14.5)
RBC BLD AUTO: NORMAL — SIGNIFICANT CHANGE UP
RBC CASTS # UR COMP ASSIST: ABNORMAL /HPF (ref 0–4)
SARS-COV-2 RNA SPEC QL NAA+PROBE: SIGNIFICANT CHANGE UP
SODIUM SERPL-SCNC: 136 MMOL/L — SIGNIFICANT CHANGE UP (ref 135–145)
SP GR SPEC: 1.02 — SIGNIFICANT CHANGE UP (ref 1.01–1.02)
THC UR QL: POSITIVE — SIGNIFICANT CHANGE UP
TROPONIN I, HIGH SENSITIVITY RESULT: 6.6 NG/L — SIGNIFICANT CHANGE UP
UROBILINOGEN FLD QL: 4 MG/DL
WBC # BLD: 5.34 K/UL — SIGNIFICANT CHANGE UP (ref 3.8–10.5)
WBC # FLD AUTO: 5.34 K/UL — SIGNIFICANT CHANGE UP (ref 3.8–10.5)
WBC UR QL: >50

## 2023-02-01 RX ORDER — POTASSIUM CHLORIDE 20 MEQ
20 PACKET (EA) ORAL ONCE
Refills: 0 | Status: COMPLETED | OUTPATIENT
Start: 2023-02-01 | End: 2023-02-01

## 2023-02-01 RX ORDER — ACETAMINOPHEN 500 MG
650 TABLET ORAL EVERY 6 HOURS
Refills: 0 | Status: DISCONTINUED | OUTPATIENT
Start: 2023-02-01 | End: 2023-02-03

## 2023-02-01 RX ORDER — SODIUM CHLORIDE 9 MG/ML
1000 INJECTION, SOLUTION INTRAVENOUS
Refills: 0 | Status: DISCONTINUED | OUTPATIENT
Start: 2023-02-01 | End: 2023-02-03

## 2023-02-01 RX ORDER — FOLIC ACID 0.8 MG
1 TABLET ORAL DAILY
Refills: 0 | Status: DISCONTINUED | OUTPATIENT
Start: 2023-02-01 | End: 2023-02-03

## 2023-02-01 RX ORDER — LANOLIN ALCOHOL/MO/W.PET/CERES
3 CREAM (GRAM) TOPICAL AT BEDTIME
Refills: 0 | Status: DISCONTINUED | OUTPATIENT
Start: 2023-02-01 | End: 2023-02-03

## 2023-02-01 RX ORDER — MAGNESIUM SULFATE 500 MG/ML
1 VIAL (ML) INJECTION ONCE
Refills: 0 | Status: COMPLETED | OUTPATIENT
Start: 2023-02-01 | End: 2023-02-01

## 2023-02-01 RX ORDER — CEFTRIAXONE 500 MG/1
1000 INJECTION, POWDER, FOR SOLUTION INTRAMUSCULAR; INTRAVENOUS ONCE
Refills: 0 | Status: COMPLETED | OUTPATIENT
Start: 2023-02-01 | End: 2023-02-01

## 2023-02-01 RX ORDER — POTASSIUM PHOSPHATE, MONOBASIC POTASSIUM PHOSPHATE, DIBASIC 236; 224 MG/ML; MG/ML
15 INJECTION, SOLUTION INTRAVENOUS ONCE
Refills: 0 | Status: COMPLETED | OUTPATIENT
Start: 2023-02-01 | End: 2023-02-01

## 2023-02-01 RX ORDER — CEFTRIAXONE 500 MG/1
1000 INJECTION, POWDER, FOR SOLUTION INTRAMUSCULAR; INTRAVENOUS EVERY 24 HOURS
Refills: 0 | Status: DISCONTINUED | OUTPATIENT
Start: 2023-02-02 | End: 2023-02-03

## 2023-02-01 RX ORDER — THIAMINE MONONITRATE (VIT B1) 100 MG
100 TABLET ORAL ONCE
Refills: 0 | Status: COMPLETED | OUTPATIENT
Start: 2023-02-01 | End: 2023-02-01

## 2023-02-01 RX ORDER — SODIUM,POTASSIUM PHOSPHATES 278-250MG
2 POWDER IN PACKET (EA) ORAL ONCE
Refills: 0 | Status: COMPLETED | OUTPATIENT
Start: 2023-02-01 | End: 2023-02-01

## 2023-02-01 RX ORDER — SODIUM CHLORIDE 9 MG/ML
1000 INJECTION INTRAMUSCULAR; INTRAVENOUS; SUBCUTANEOUS ONCE
Refills: 0 | Status: COMPLETED | OUTPATIENT
Start: 2023-02-01 | End: 2023-02-01

## 2023-02-01 RX ORDER — THIAMINE MONONITRATE (VIT B1) 100 MG
100 TABLET ORAL DAILY
Refills: 0 | Status: DISCONTINUED | OUTPATIENT
Start: 2023-02-02 | End: 2023-02-03

## 2023-02-01 RX ADMIN — Medication 2 MILLIGRAM(S): at 14:55

## 2023-02-01 RX ADMIN — POTASSIUM PHOSPHATE, MONOBASIC POTASSIUM PHOSPHATE, DIBASIC 62.5 MILLIMOLE(S): 236; 224 INJECTION, SOLUTION INTRAVENOUS at 17:29

## 2023-02-01 RX ADMIN — Medication 1 MILLIGRAM(S): at 14:01

## 2023-02-01 RX ADMIN — SODIUM CHLORIDE 125 MILLILITER(S): 9 INJECTION, SOLUTION INTRAVENOUS at 17:30

## 2023-02-01 RX ADMIN — Medication 100 GRAM(S): at 11:49

## 2023-02-01 RX ADMIN — Medication 100 MILLIGRAM(S): at 14:01

## 2023-02-01 RX ADMIN — Medication 20 MILLIEQUIVALENT(S): at 11:49

## 2023-02-01 RX ADMIN — SODIUM CHLORIDE 1000 MILLILITER(S): 9 INJECTION INTRAMUSCULAR; INTRAVENOUS; SUBCUTANEOUS at 10:23

## 2023-02-01 RX ADMIN — Medication 2 MILLIGRAM(S): at 20:28

## 2023-02-01 RX ADMIN — Medication 2 PACKET(S): at 12:05

## 2023-02-01 RX ADMIN — Medication 50 MILLIGRAM(S): at 10:23

## 2023-02-01 RX ADMIN — CEFTRIAXONE 100 MILLIGRAM(S): 500 INJECTION, POWDER, FOR SOLUTION INTRAMUSCULAR; INTRAVENOUS at 11:49

## 2023-02-01 RX ADMIN — Medication 2 MILLIGRAM(S): at 23:22

## 2023-02-01 RX ADMIN — SODIUM CHLORIDE 125 MILLILITER(S): 9 INJECTION, SOLUTION INTRAVENOUS at 16:44

## 2023-02-01 RX ADMIN — Medication 3 MILLIGRAM(S): at 23:23

## 2023-02-01 NOTE — ED PROVIDER NOTE - PHYSICAL EXAMINATION
GEN: Awake, alert, interactive, NAD.   HEAD AND NECK: NC/AT. Airway patent. Neck supple.   EYES:  Clear b/l. EOMI. PERRL.   ENT: Moist mucus membranes.   CARDIAC: Regular rate, regular rhythm. No evident pedal edema.    RESP/CHEST: Normal respiratory effort with no use of accessory muscles or retractions. Clear throughout on auscultation.  ABD: soft, non-distended, non-tender. No rebound, no guarding.   BACK: No midline spinal TTP. No CVAT.   EXTREMITIES: Bilateral foot: No deformity, (+) dry, scaly, (+) mild generalized tenderness, no edema, (+) pulses intact.  Moving all extremities with no apparent deformities.   SKIN: Warm, dry, intact normal color. No rash.   NEURO: AOx3, CN II-XII grossly intact, no focal deficits. (+) mild tremors.   PSYCH: Appropriate mood and affect.

## 2023-02-01 NOTE — ED PROVIDER NOTE - NS ED ATTENDING STATEMENT MOD
This was a shared visit with the AMARJIT. I reviewed and verified the documentation and independently performed the documented:

## 2023-02-01 NOTE — ED ADULT TRIAGE NOTE - TEMPERATURE IN FAHRENHEIT (DEGREES F)
Health Maintenance Due   Topic Date Due   • DTaP/Tdap/Td Vaccine (1 - Tdap) 02/07/1942   • Shingles Vaccine (2 of 3) 10/05/2009   • DM/CKD Microalbumin  04/04/2018   • Medicare Wellness 65+  10/22/2020       Patient is due for topics as listed above but is not proceeding with Immunization(s) Dtap/Tdap/Td and Shingles, DM/CKD Microalbumin and MWV (Medicare Wellness Visit) at this time. Education provided for Immunization(s) Dtap/Tdap/Td and Shingles, DM/CKD Microalbumin and MWV (Medicare Wellness Visit).         99.7
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
## 2023-02-01 NOTE — ED ADULT NURSE NOTE - NSIMPLEMENTINTERV_GEN_ALL_ED
Implemented All Fall Risk Interventions:  [place] to call system. Call bell, personal items and telephone within reach. Instruct patient to call for assistance. Room bathroom lighting operational. Non-slip footwear when patient is off stretcher. Physically safe environment: no spills, clutter or unnecessary equipment. Stretcher in lowest position, wheels locked, appropriate side rails in place. Provide visual cue, wrist band, yellow gown, etc. Monitor gait and stability. Monitor for mental status changes and reorient to person, place, and time. Review medications for side effects contributing to fall risk. Reinforce activity limits and safety measures with patient and family.

## 2023-02-01 NOTE — H&P ADULT - PROBLEM SELECTOR PLAN 3
Tachycardic, slight tremors  Low CIWA score at this moment  Will start with symptoms trigger oral ativan CIWA protocol  Monitor for DT   thiamine, folic acid, multivitamin  S/W consult- patient is homeless

## 2023-02-01 NOTE — H&P ADULT - ASSESSMENT
42 years old male with h/o alcohol use present to ED with ? syncope. Patient reported unsteady gait, fell and hit his head. Denied any dizziness or LOC but per triage note, had witness syncope. Unreliable history of alcohol use. Patient reported only a few beer a week but with multiple ED visit in 2020 with alcohol intoxication with high blood alcohol level  Tachycardic to 123, afebrile, sat well at RA. No leukocytosis, Plt 90, K 3.4, Mg 1.5, phosphorus 1.9, Cr 0.99, hsTnT 6.6. UA appear dirty, Utox positive for THC, serum alcohol < 10. Flu/COVID negative. CXR image reviewed, no focal consolidation. CT head with no acute pathology    Admitted with unsteady gait, electrolytes disorder,

## 2023-02-01 NOTE — SBIRT NOTE ADULT - NSSBIRTNALRESKIT_GEN_A_CORE
Naloxone Rescue Kit dispensed: VS-390 & VS-389, exp 07/25. Pt was educated about Naloxone and trained on how to utilize the kit./Educated/Dispensed

## 2023-02-01 NOTE — ED ADULT TRIAGE NOTE - CHIEF COMPLAINT QUOTE
BIBA for witnessed syncope inside the deli. Patient is unable to remember incident. AxOx4 in triage. Denies any alcohol or substance use. No facial droop or slurred speech noted. Active ROM on all extremities. BIBA for witnessed syncope inside the deli. Patient is unable to remember incident. AxOx4 in triage. Denies any alcohol or substance use. No facial droop or slurred speech noted. Active ROM on all extremities.  in triage.

## 2023-02-01 NOTE — SBIRT NOTE ADULT - NSSBIRTBRIEFINTDET_GEN_A_CORE
Provided SBIRT services: Full screen positive. Brief Intervention Performed. Screening results were reviewed with the patient and patient was provided information about healthy guidelines and potential negative consequences associated with level of risk. Motivation and readiness to reduce or stop use was discussed and goals and activities to make changes were suggested/offered. Patient reports last drink Sunday.

## 2023-02-01 NOTE — SBIRT NOTE ADULT - NSSBIRTDRGBRIEFINTDET_GEN_A_CORE
Provided SBIRT services: Full screen positive. Pt reports marijuana use occasionally. Brief Intervention Performed. Screening results were reviewed with the patient and patient was provided information about healthy guidelines and potential negative consequences associated with level of risk. Motivation and readiness to reduce or stop use was discussed and goals and activities to make changes were suggested/offered. Last use beginning of January. Reports he does not want to continue use.    Pt reports tobacco use. Approx. a pack weekly. States last use over a week ago. Tobacco cessation information provided to pt- pt interested in NRT (gum).

## 2023-02-01 NOTE — H&P ADULT - HISTORY OF PRESENT ILLNESS
42 years old male with h/o alcohol use present to ED with ? syncope. Patient reported unsteady gait, fell and hit his head. Denied any dizzinezz or LOC but per triage note, had witness syncope. Unreliable history of alcohol use. Patient reported only a few beer a week but with multiple ED visit in 2020 with alcohol intoxication with high blood alcohol level  Tachycardic to 123, afebrile, sat well at RA. No leukocytosis, Plt 90, K 3.4, Mg 1.5, phosphorus 1.9, Cr 0.99, hsTnT 6.6. UA appear dirty, Utox positive for THC, serum alcohol < 10. Flu/COVID negative. CXR image reviewed, no focal consolidation. CT head with no acute pathology    SH: alcohol use, cannabis use  FH: no family h/o HTN, DM

## 2023-02-01 NOTE — ED ADULT NURSE NOTE - CHIEF COMPLAINT QUOTE
BIBA for witnessed syncope inside the deli. Patient is unable to remember incident. AxOx4 in triage. Denies any alcohol or substance use. No facial droop or slurred speech noted. Active ROM on all extremities.  in triage.
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
## 2023-02-01 NOTE — H&P ADULT - PROBLEM SELECTOR PLAN 1
K 3.4, Mg 1.5, phosphorus 1.9  Replace with KCL, Mg sulfate, IV K phos  Monitor and replace serum electrolytes

## 2023-02-01 NOTE — H&P ADULT - PROBLEM SELECTOR PLAN 2
Reported unsteady gait for several months  Slight dysmetria in exam  CT head with no acute pathology  Will get MRI brain  Check B12, folate  continue thiamine  PT evaluation

## 2023-02-01 NOTE — H&P ADULT - NSHPPHYSICALEXAM_GEN_ALL_CORE
CONSTITUTIONAL: alert and cooperative, no acute distress  EYES: PERRL,  no scleral icterus, bruised noted on right eye  ENT: Mucosa moist, tongue normal.   NECK: Neck supple, trachea midline, non-tender  CARDIAC: Normal S1 and S2. Regular rate and rhythms. No Pedal edema. Peripheral pulses intact  LUNGS: Equal air entry both lungs. No rales, rhonchi, wheezing. Normal respiratory effort.   ABDOMEN: Soft, nondistended, nontender. No guarding or rebound tenderness. No hepatomegaly or splenomegaly. Bowel sound normal.   MUSCULOSKELETAL: Normocephalic, atraumatic.  No significant deformity or joint abnormality. No varicosities.  NEUROLOGICAL: No gross motor or sensory deficits. Appear to have tremors. Slight dysmetria- suspect due to tremors  SKIN: no lesions or eruptions. Decrease turgor  PSYCHIATRIC: A&O x 3, appropriate mood and affect

## 2023-02-01 NOTE — ED PROVIDER NOTE - ATTENDING APP SHARED VISIT CONTRIBUTION OF CARE
I have seen and evaluated the patient and agree with the PAs note as documented. I discussed the case with the PA including diagnosis, treatment plan, and admission for inability to ambulate due to etoh w/d and electrolyte abnormalities due to alcohol abuse.

## 2023-02-02 LAB
ALBUMIN SERPL ELPH-MCNC: 3.4 G/DL — SIGNIFICANT CHANGE UP (ref 3.3–5)
ALP SERPL-CCNC: 110 U/L — SIGNIFICANT CHANGE UP (ref 40–120)
ALT FLD-CCNC: 42 U/L — SIGNIFICANT CHANGE UP (ref 12–78)
ANION GAP SERPL CALC-SCNC: 9 MMOL/L — SIGNIFICANT CHANGE UP (ref 5–17)
AST SERPL-CCNC: 80 U/L — HIGH (ref 15–37)
BILIRUB SERPL-MCNC: 0.9 MG/DL — SIGNIFICANT CHANGE UP (ref 0.2–1.2)
BUN SERPL-MCNC: 14 MG/DL — SIGNIFICANT CHANGE UP (ref 7–23)
CALCIUM SERPL-MCNC: 8.8 MG/DL — SIGNIFICANT CHANGE UP (ref 8.5–10.1)
CHLORIDE SERPL-SCNC: 103 MMOL/L — SIGNIFICANT CHANGE UP (ref 96–108)
CO2 SERPL-SCNC: 26 MMOL/L — SIGNIFICANT CHANGE UP (ref 22–31)
CREAT SERPL-MCNC: 0.69 MG/DL — SIGNIFICANT CHANGE UP (ref 0.5–1.3)
EGFR: 117 ML/MIN/1.73M2 — SIGNIFICANT CHANGE UP
FOLATE SERPL-MCNC: 13.9 NG/ML — SIGNIFICANT CHANGE UP
GLUCOSE SERPL-MCNC: 87 MG/DL — SIGNIFICANT CHANGE UP (ref 70–99)
HCT VFR BLD CALC: 37.9 % — LOW (ref 39–50)
HGB BLD-MCNC: 12.5 G/DL — LOW (ref 13–17)
MAGNESIUM SERPL-MCNC: 1.9 MG/DL — SIGNIFICANT CHANGE UP (ref 1.6–2.6)
MCHC RBC-ENTMCNC: 32.8 PG — SIGNIFICANT CHANGE UP (ref 27–34)
MCHC RBC-ENTMCNC: 33 G/DL — SIGNIFICANT CHANGE UP (ref 32–36)
MCV RBC AUTO: 99.5 FL — SIGNIFICANT CHANGE UP (ref 80–100)
NRBC # BLD: 0 /100 WBCS — SIGNIFICANT CHANGE UP (ref 0–0)
PHOSPHATE SERPL-MCNC: 2.7 MG/DL — SIGNIFICANT CHANGE UP (ref 2.5–4.5)
PHOSPHATE SERPL-MCNC: 3.3 MG/DL — SIGNIFICANT CHANGE UP (ref 2.5–4.5)
PLATELET # BLD AUTO: 97 K/UL — LOW (ref 150–400)
POTASSIUM SERPL-MCNC: 3.4 MMOL/L — LOW (ref 3.5–5.3)
POTASSIUM SERPL-SCNC: 3.4 MMOL/L — LOW (ref 3.5–5.3)
PROT SERPL-MCNC: 7.6 GM/DL — SIGNIFICANT CHANGE UP (ref 6–8.3)
RBC # BLD: 3.81 M/UL — LOW (ref 4.2–5.8)
RBC # FLD: 13.4 % — SIGNIFICANT CHANGE UP (ref 10.3–14.5)
SODIUM SERPL-SCNC: 138 MMOL/L — SIGNIFICANT CHANGE UP (ref 135–145)
T4 FREE SERPL-MCNC: 1.1 NG/DL — SIGNIFICANT CHANGE UP (ref 0.9–1.8)
TSH SERPL-MCNC: 2.03 UIU/ML — SIGNIFICANT CHANGE UP (ref 0.36–3.74)
VIT B12 SERPL-MCNC: 891 PG/ML — SIGNIFICANT CHANGE UP (ref 232–1245)
WBC # BLD: 4.83 K/UL — SIGNIFICANT CHANGE UP (ref 3.8–10.5)
WBC # FLD AUTO: 4.83 K/UL — SIGNIFICANT CHANGE UP (ref 3.8–10.5)

## 2023-02-02 RX ORDER — INFLUENZA VIRUS VACCINE 15; 15; 15; 15 UG/.5ML; UG/.5ML; UG/.5ML; UG/.5ML
0.5 SUSPENSION INTRAMUSCULAR ONCE
Refills: 0 | Status: DISCONTINUED | OUTPATIENT
Start: 2023-02-02 | End: 2023-02-03

## 2023-02-02 RX ADMIN — Medication 1 MILLIGRAM(S): at 11:28

## 2023-02-02 RX ADMIN — Medication 50 MILLIGRAM(S): at 21:51

## 2023-02-02 RX ADMIN — SODIUM CHLORIDE 75 MILLILITER(S): 9 INJECTION, SOLUTION INTRAVENOUS at 17:36

## 2023-02-02 RX ADMIN — Medication 50 MILLIGRAM(S): at 11:27

## 2023-02-02 RX ADMIN — Medication 50 MILLIGRAM(S): at 00:23

## 2023-02-02 RX ADMIN — Medication 50 MILLIGRAM(S): at 05:14

## 2023-02-02 RX ADMIN — CEFTRIAXONE 100 MILLIGRAM(S): 500 INJECTION, POWDER, FOR SOLUTION INTRAMUSCULAR; INTRAVENOUS at 15:06

## 2023-02-02 RX ADMIN — Medication 50 MILLIGRAM(S): at 15:06

## 2023-02-02 RX ADMIN — Medication 4 MILLIGRAM(S): at 00:07

## 2023-02-02 RX ADMIN — Medication 50 MILLIGRAM(S): at 17:31

## 2023-02-02 RX ADMIN — Medication 100 MILLIGRAM(S): at 11:27

## 2023-02-02 NOTE — PHYSICAL THERAPY INITIAL EVALUATION ADULT - PERTINENT HX OF CURRENT PROBLEM, REHAB EVAL
Pt is a 42-y/o, male admitted to [facility] due to syncope. As per chart, pt with unsteady gait and fell and hit his head. Of note, pt had multiple ED admissions due to alcoholic intoxication. Pt now referred to PT for functional assessment.

## 2023-02-02 NOTE — PATIENT PROFILE ADULT - FALL HARM RISK - HARM RISK INTERVENTIONS

## 2023-02-02 NOTE — ED ADULT NURSE REASSESSMENT NOTE - NS ED NURSE REASSESS COMMENT FT1
Received patient aox3  breathing on room air saturation 98% on room air attached to cardiac monitor shows nsr with heart rate  of 74 beats per min . Denies any discomfort at this time / No tremors noted .
MD Chambers notified of pt decline. RN asked MD for 1 on 1 sitter. pt is now a harm to himself
Pt admitted to 1E, report called to Linh Melton RN.
MD Chambers called pt heart rate and CIWA score

## 2023-02-02 NOTE — PHYSICAL THERAPY INITIAL EVALUATION ADULT - BED MOBILITY TRAINING, PT EVAL
Pt will independently perform all aspects of bed mobility to help prevent pressure ulcers, by 4 weeks.

## 2023-02-02 NOTE — PHYSICAL THERAPY INITIAL EVALUATION ADULT - BALANCE TRAINING, PT EVAL
Pt will be independent in sitting, transfers, standing and ambulation with good balance using appropriate assistive device and prevent falls by 4 weeks.

## 2023-02-02 NOTE — PROGRESS NOTE ADULT - SUBJECTIVE AND OBJECTIVE BOX
PROGRESS NOTE:     Patient is a 42y old  Male who presents with a chief complaint of fall, unsteady gait, electrolytes disorder (2023 16:50)        SUBJECTIVE & OBJECTIVE:   Pt seen and examined at bedside in AM    no overnight events.   no new complaints    REVIEW OF SYSTEMS: remaining ROS negative     PHYSICAL EXAM:  Vitals stable       GENERAL: NAD,  no increased WOB  HEAD:  Atraumatic, Normocephalic  EYES: EOMI, PERRLA, conjunctiva and sclera clear  ENMT: Moist mucous membranes  NECK: Supple, No JVD  NERVOUS SYSTEM:  awake and alert, , no focal neuro deficits   CHEST/LUNG: Clear to auscultation bilaterally; No rales, rhonchi, wheezing, or rubs  HEART: Regular rate and rhythm; No murmurs, rubs, or gallops  ABDOMEN: Soft, Nontender, Nondistended; Bowel sounds present  EXTREMITIES:  2+ Peripheral Pulses b/l, No clubbing, cyanosis, calf tenderness or edema b/l    MEDICATIONS  (STANDING):  cefTRIAXone   IVPB 1000 milliGRAM(s) IV Intermittent every 24 hours  chlordiazePOXIDE 50 milliGRAM(s) Oral every 4 hours  folic acid 1 milliGRAM(s) Oral daily  influenza   Vaccine 0.5 milliLiter(s) IntraMuscular once  lactated ringers. 1000 milliLiter(s) (75 mL/Hr) IV Continuous <Continuous>  thiamine 100 milliGRAM(s) Oral daily    MEDICATIONS  (PRN):  acetaminophen     Tablet .. 650 milliGRAM(s) Oral every 6 hours PRN Mild Pain (1 - 3), Moderate Pain (4 - 6)  chlordiazePOXIDE 25 milliGRAM(s) Oral every 6 hours PRN Anxiety and/or Alcohol Withdrawal Symptoms  melatonin 3 milliGRAM(s) Oral at bedtime PRN Insomnia      LABS:                        12.5   4.83  )-----------( 97       ( 2023 08:48 )             37.9     02-02    138  |  103  |  14  ----------------------------<  87  3.4<L>   |  26  |  0.69    Ca    8.8      2023 08:48  Phos  3.3     02-02  Mg     1.9     02-02    TPro  7.6  /  Alb  3.4  /  TBili  0.9  /  DBili  x   /  AST  80<H>  /  ALT  42  /  AlkPhos  110      PT/INR - ( 2023 10:08 )   PT: 12.6 sec;   INR: 1.05 ratio         PTT - ( 2023 10:08 )  PTT:29.5 sec  Urinalysis Basic - ( 2023 10:00 )    Color: Bre / Appearance: Slightly Turbid / S.025 / pH: x  Gluc: x / Ketone: Large  / Bili: Small / Urobili: 4 mg/dL   Blood: x / Protein: 500 mg/dL / Nitrite: Negative   Leuk Esterase: Small / RBC: 6-10 /HPF / WBC >50   Sq Epi: x / Non Sq Epi: Few / Bacteria: Many      Magnesium, Serum: 1.9 mg/dL ( @ 08:48)    CAPILLARY BLOOD GLUCOSE                RECENT CULTURES:          RADIOLOGY & ADDITIONAL TESTS:          Imaging Personally Reviewed:  [ ] YES  [ ] NO    Consultant(s) Notes Reviewed:  [x ] YES  [ ] NO    Care Discussed with Consultants/Other Providers [x ] YES  [ ] NO  Care plan and all findings were discussed in detail with patient.  All questions and concerns addressed

## 2023-02-02 NOTE — PHYSICAL THERAPY INITIAL EVALUATION ADULT - GAIT TRAINING, PT EVAL
Pt will independently ambulate 100 feet with appropriate device without loss of balance, by 4 weeks.

## 2023-02-02 NOTE — PHYSICAL THERAPY INITIAL EVALUATION ADULT - ADDITIONAL COMMENTS
Pt states to be homeless. Pt states he is able to ambulate independent with no device, however keeps falling.

## 2023-02-02 NOTE — PROGRESS NOTE ADULT - ASSESSMENT
42 years old male with h/o alcohol use present to ED with ? syncope. Patient reported unsteady gait, fell and hit his head. Denied any dizziness or LOC but per triage note, had witness syncope. Unreliable history of alcohol use. Patient reported only a few beer a week but with multiple ED visit in 2020 with alcohol intoxication with high blood alcohol level  Tachycardic to 123, afebrile, sat well at RA. No leukocytosis, Plt 90, K 3.4, Mg 1.5, phosphorus 1.9, Cr 0.99, hsTnT 6.6. UA appear dirty, Utox positive for THC, serum alcohol < 10. Flu/COVID negative. CXR image reviewed, no focal consolidation. CT head with no acute pathology    Admitted with unsteady gait, electrolytes disorder,      Problem/Plan - 1:  ·  Problem: Disorder of electrolytes.   ·  Plan: K 3.4, Mg 1.5, phosphorus 1.9  Replace with KCL, Mg sulfate, IV K phos  Monitor and replace serum electrolytes.     Problem/Plan - 2:  ·  Problem: Unsteady gait.   ·  Plan: Reported unsteady gait for several months  Slight dysmetria in exam  CT head with no acute pathology  Will get MRI brain  Check B12, folate  continue thiamine  PT evaluation.     Problem/Plan - 3:  ·  Problem: Alcohol withdrawal syndrome.   ·  Plan: Tachycardic, slight tremors  Low CIWA score at this moment  Will start with symptoms trigger oral ativan CIWA protocol  Monitor for DT   thiamine, folic acid, multivitamin  S/W consult- patient is homeless.     Problem/Plan - 4:  ·  Problem: Acute UTI.   ·  Plan: Ceftriaxone 1g daily  Follow up urine culture.     Problem/Plan - 5:  ·  Problem: Dehydration.   ·  Plan: IV fluid  Encourage oral intake.

## 2023-02-02 NOTE — PATIENT PROFILE ADULT - CAREGIVER OBTAIN DETAILS
stated he does not have any family or anyone  stated he does not have any family , just friends. unable to provide a contact#

## 2023-02-03 ENCOUNTER — INPATIENT (INPATIENT)
Facility: HOSPITAL | Age: 43
LOS: 5 days | Discharge: NURSING, REHAB OR LONG-TERM CARE | End: 2023-02-09
Attending: INTERNAL MEDICINE | Admitting: INTERNAL MEDICINE
Payer: MEDICAID

## 2023-02-03 VITALS
SYSTOLIC BLOOD PRESSURE: 165 MMHG | WEIGHT: 149.91 LBS | HEART RATE: 147 BPM | HEIGHT: 71 IN | DIASTOLIC BLOOD PRESSURE: 91 MMHG | OXYGEN SATURATION: 100 % | RESPIRATION RATE: 16 BRPM

## 2023-02-03 VITALS
TEMPERATURE: 98 F | RESPIRATION RATE: 18 BRPM | DIASTOLIC BLOOD PRESSURE: 81 MMHG | SYSTOLIC BLOOD PRESSURE: 127 MMHG | OXYGEN SATURATION: 98 % | HEART RATE: 73 BPM

## 2023-02-03 LAB
CULTURE RESULTS: SIGNIFICANT CHANGE UP
GLUCOSE BLDC GLUCOMTR-MCNC: 98 MG/DL — SIGNIFICANT CHANGE UP (ref 70–99)
SPECIMEN SOURCE: SIGNIFICANT CHANGE UP

## 2023-02-03 RX ORDER — FOLIC ACID 0.8 MG
1 TABLET ORAL
Qty: 30 | Refills: 0
Start: 2023-02-03 | End: 2023-03-04

## 2023-02-03 RX ORDER — NICOTINE POLACRILEX 2 MG
1 GUM BUCCAL DAILY
Refills: 0 | Status: DISCONTINUED | OUTPATIENT
Start: 2023-02-03 | End: 2023-02-03

## 2023-02-03 RX ORDER — NICOTINE POLACRILEX 2 MG
1 GUM BUCCAL
Qty: 30 | Refills: 0
Start: 2023-02-03 | End: 2023-03-04

## 2023-02-03 RX ORDER — THIAMINE MONONITRATE (VIT B1) 100 MG
1 TABLET ORAL
Qty: 30 | Refills: 0
Start: 2023-02-03 | End: 2023-03-04

## 2023-02-03 RX ORDER — POTASSIUM CHLORIDE 20 MEQ
20 PACKET (EA) ORAL ONCE
Refills: 0 | Status: COMPLETED | OUTPATIENT
Start: 2023-02-03 | End: 2023-02-03

## 2023-02-03 RX ADMIN — Medication 1 MILLIGRAM(S): at 11:19

## 2023-02-03 RX ADMIN — Medication 50 MILLIGRAM(S): at 14:01

## 2023-02-03 RX ADMIN — Medication 100 MILLIGRAM(S): at 11:19

## 2023-02-03 RX ADMIN — Medication 50 MILLIGRAM(S): at 01:54

## 2023-02-03 RX ADMIN — Medication 1 TABLET(S): at 11:19

## 2023-02-03 RX ADMIN — Medication 50 MILLIGRAM(S): at 05:38

## 2023-02-03 RX ADMIN — Medication 20 MILLIEQUIVALENT(S): at 11:20

## 2023-02-03 RX ADMIN — Medication 1 PATCH: at 14:44

## 2023-02-03 RX ADMIN — Medication 2 MILLIGRAM(S): at 08:45

## 2023-02-03 RX ADMIN — Medication 50 MILLIGRAM(S): at 09:49

## 2023-02-03 RX ADMIN — SODIUM CHLORIDE 75 MILLILITER(S): 9 INJECTION, SOLUTION INTRAVENOUS at 08:48

## 2023-02-03 NOTE — DISCHARGE NOTE PROVIDER - NSFOLLOWUPCLINICS_GEN_ALL_ED_FT
[facility] General Internal Medicine  General Internal Medicine  [address] [id number]  Phone: [phone]  Fax:   Follow Up Time: 1 week

## 2023-02-03 NOTE — DISCHARGE NOTE PROVIDER - NSDCCPCAREPLAN_GEN_ALL_CORE_FT
PRINCIPAL DISCHARGE DIAGNOSIS  Diagnosis: Syncope  Assessment and Plan of Treatment:       SECONDARY DISCHARGE DIAGNOSES  Diagnosis: Dehydration  Assessment and Plan of Treatment:     Diagnosis: Alcohol dependence  Assessment and Plan of Treatment:

## 2023-02-03 NOTE — DISCHARGE NOTE PROVIDER - NSDCFUADDAPPT_GEN_ALL_CORE_FT
It is important to see your primary physician as well as other necessary consultants within the next week to perform a comprehensive medical review.  Call their offices for an appointment as soon as you leave the hospital.  If you do not have a primary physician or cant reach him/her, contact the [facility] Physician Referral Service at [phone].  Your medical issues appear to be stable at this time, but if your symptoms recur or worsen, contact your physicians and/or return to the hospital if necessary.  If you encounter any issues or questions with your medication, call your physicians before stopping the medication.
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
## 2023-02-03 NOTE — DISCHARGE NOTE NURSING/CASE MANAGEMENT/SOCIAL WORK - PATIENT PORTAL LINK FT
You can access the FollowMyHealth Patient Portal offered by [facility] by registering at the following website: [email or web address]. By joining [facility]’s FollowMyHealth portal, you will also be able to view your health information using other applications (apps) compatible with our system.

## 2023-02-03 NOTE — DISCHARGE NOTE NURSING/CASE MANAGEMENT/SOCIAL WORK - NSDCFUADDAPPT_GEN_ALL_CORE_FT
It is important to see your primary physician as well as other necessary consultants within the next week to perform a comprehensive medical review.  Call their offices for an appointment as soon as you leave the hospital.  If you do not have a primary physician or cant reach him/her, contact the [facility] Physician Referral Service at [phone].  Your medical issues appear to be stable at this time, but if your symptoms recur or worsen, contact your physicians and/or return to the hospital if necessary.  If you encounter any issues or questions with your medication, call your physicians before stopping the medication.

## 2023-02-03 NOTE — DISCHARGE NOTE PROVIDER - NSDCMRMEDTOKEN_GEN_ALL_CORE_FT
folic acid 1 mg oral tablet: 1 tab(s) orally once a day  Multiple Vitamins oral tablet: 1 tab(s) orally once a day  nicotine 14 mg/24 hr transdermal film, extended release: 1 patch transdermal once a day   thiamine 100 mg oral tablet: 1 tab(s) orally once a day

## 2023-02-03 NOTE — DISCHARGE NOTE NURSING/CASE MANAGEMENT/SOCIAL WORK - NSDCPEFALRISK_GEN_ALL_CORE
For information on Fall & Injury Prevention, visit: [email or web address] OR  [email or web address] OR  https://www.cdc.gov/steadi/patient.html

## 2023-02-04 DIAGNOSIS — G93.41 METABOLIC ENCEPHALOPATHY: ICD-10-CM

## 2023-02-04 DIAGNOSIS — F10.10 ALCOHOL ABUSE, UNCOMPLICATED: ICD-10-CM

## 2023-02-04 DIAGNOSIS — F10.239 ALCOHOL DEPENDENCE WITH WITHDRAWAL, UNSPECIFIED: ICD-10-CM

## 2023-02-04 LAB
ALBUMIN SERPL ELPH-MCNC: 4.1 G/DL — SIGNIFICANT CHANGE UP (ref 3.3–5)
ALP SERPL-CCNC: 115 U/L — SIGNIFICANT CHANGE UP (ref 40–120)
ALT FLD-CCNC: 47 U/L — SIGNIFICANT CHANGE UP (ref 12–78)
ANION GAP SERPL CALC-SCNC: 14 MMOL/L — SIGNIFICANT CHANGE UP (ref 5–17)
AST SERPL-CCNC: 85 U/L — HIGH (ref 15–37)
BASOPHILS # BLD AUTO: 0.07 K/UL — SIGNIFICANT CHANGE UP (ref 0–0.2)
BASOPHILS NFR BLD AUTO: 0.7 % — SIGNIFICANT CHANGE UP (ref 0–2)
BILIRUB SERPL-MCNC: 0.5 MG/DL — SIGNIFICANT CHANGE UP (ref 0.2–1.2)
BUN SERPL-MCNC: 15 MG/DL — SIGNIFICANT CHANGE UP (ref 7–23)
CALCIUM SERPL-MCNC: 9.9 MG/DL — SIGNIFICANT CHANGE UP (ref 8.5–10.1)
CHLORIDE SERPL-SCNC: 107 MMOL/L — SIGNIFICANT CHANGE UP (ref 96–108)
CO2 SERPL-SCNC: 23 MMOL/L — SIGNIFICANT CHANGE UP (ref 22–31)
CREAT SERPL-MCNC: 1.17 MG/DL — SIGNIFICANT CHANGE UP (ref 0.5–1.3)
EGFR: 79 ML/MIN/1.73M2 — SIGNIFICANT CHANGE UP
EOSINOPHIL # BLD AUTO: 0.03 K/UL — SIGNIFICANT CHANGE UP (ref 0–0.5)
EOSINOPHIL NFR BLD AUTO: 0.3 % — SIGNIFICANT CHANGE UP (ref 0–6)
ETHANOL SERPL-MCNC: <10 MG/DL — SIGNIFICANT CHANGE UP (ref 0–10)
FLUAV AG NPH QL: SIGNIFICANT CHANGE UP
FLUBV AG NPH QL: SIGNIFICANT CHANGE UP
GLUCOSE SERPL-MCNC: 87 MG/DL — SIGNIFICANT CHANGE UP (ref 70–99)
HCT VFR BLD CALC: 36.1 % — LOW (ref 39–50)
HGB BLD-MCNC: 12 G/DL — LOW (ref 13–17)
IMM GRANULOCYTES NFR BLD AUTO: 1.5 % — HIGH (ref 0–0.9)
LIDOCAIN IGE QN: 690 U/L — HIGH (ref 73–393)
LYMPHOCYTES # BLD AUTO: 0.59 K/UL — LOW (ref 1–3.3)
LYMPHOCYTES # BLD AUTO: 5.6 % — LOW (ref 13–44)
MAGNESIUM SERPL-MCNC: 1.6 MG/DL — SIGNIFICANT CHANGE UP (ref 1.6–2.6)
MCHC RBC-ENTMCNC: 32.7 PG — SIGNIFICANT CHANGE UP (ref 27–34)
MCHC RBC-ENTMCNC: 33.2 G/DL — SIGNIFICANT CHANGE UP (ref 32–36)
MCV RBC AUTO: 98.4 FL — SIGNIFICANT CHANGE UP (ref 80–100)
MONOCYTES # BLD AUTO: 1.06 K/UL — HIGH (ref 0–0.9)
MONOCYTES NFR BLD AUTO: 10.1 % — SIGNIFICANT CHANGE UP (ref 2–14)
NEUTROPHILS # BLD AUTO: 8.62 K/UL — HIGH (ref 1.8–7.4)
NEUTROPHILS NFR BLD AUTO: 81.8 % — HIGH (ref 43–77)
NRBC # BLD: 0 /100 WBCS — SIGNIFICANT CHANGE UP (ref 0–0)
PHOSPHATE SERPL-MCNC: 4.8 MG/DL — HIGH (ref 2.5–4.5)
PLATELET # BLD AUTO: 120 K/UL — LOW (ref 150–400)
POTASSIUM SERPL-MCNC: 4 MMOL/L — SIGNIFICANT CHANGE UP (ref 3.5–5.3)
POTASSIUM SERPL-SCNC: 4 MMOL/L — SIGNIFICANT CHANGE UP (ref 3.5–5.3)
PROT SERPL-MCNC: 8.2 GM/DL — SIGNIFICANT CHANGE UP (ref 6–8.3)
RBC # BLD: 3.67 M/UL — LOW (ref 4.2–5.8)
RBC # FLD: 13.5 % — SIGNIFICANT CHANGE UP (ref 10.3–14.5)
SARS-COV-2 RNA SPEC QL NAA+PROBE: SIGNIFICANT CHANGE UP
SODIUM SERPL-SCNC: 144 MMOL/L — SIGNIFICANT CHANGE UP (ref 135–145)
WBC # BLD: 10.53 K/UL — HIGH (ref 3.8–10.5)
WBC # FLD AUTO: 10.53 K/UL — HIGH (ref 3.8–10.5)

## 2023-02-04 RX ORDER — THIAMINE MONONITRATE (VIT B1) 100 MG
500 TABLET ORAL ONCE
Refills: 0 | Status: COMPLETED | OUTPATIENT
Start: 2023-02-04 | End: 2023-02-04

## 2023-02-04 RX ORDER — PHENOBARBITAL 60 MG
130 TABLET ORAL ONCE
Refills: 0 | Status: DISCONTINUED | OUTPATIENT
Start: 2023-02-04 | End: 2023-02-04

## 2023-02-04 RX ORDER — SODIUM CHLORIDE 9 MG/ML
1000 INJECTION, SOLUTION INTRAVENOUS
Refills: 0 | Status: DISCONTINUED | OUTPATIENT
Start: 2023-02-04 | End: 2023-02-05

## 2023-02-04 RX ORDER — LANOLIN ALCOHOL/MO/W.PET/CERES
3 CREAM (GRAM) TOPICAL AT BEDTIME
Refills: 0 | Status: DISCONTINUED | OUTPATIENT
Start: 2023-02-04 | End: 2023-02-09

## 2023-02-04 RX ORDER — PHENOBARBITAL 60 MG
260 TABLET ORAL ONCE
Refills: 0 | Status: DISCONTINUED | OUTPATIENT
Start: 2023-02-04 | End: 2023-02-04

## 2023-02-04 RX ORDER — PHENOBARBITAL 60 MG
130 TABLET ORAL EVERY 6 HOURS
Refills: 0 | Status: DISCONTINUED | OUTPATIENT
Start: 2023-02-04 | End: 2023-02-05

## 2023-02-04 RX ORDER — ONDANSETRON 8 MG/1
4 TABLET, FILM COATED ORAL EVERY 8 HOURS
Refills: 0 | Status: DISCONTINUED | OUTPATIENT
Start: 2023-02-04 | End: 2023-02-09

## 2023-02-04 RX ORDER — FOLIC ACID 0.8 MG
1 TABLET ORAL DAILY
Refills: 0 | Status: DISCONTINUED | OUTPATIENT
Start: 2023-02-04 | End: 2023-02-09

## 2023-02-04 RX ORDER — SODIUM CHLORIDE 9 MG/ML
2000 INJECTION, SOLUTION INTRAVENOUS ONCE
Refills: 0 | Status: COMPLETED | OUTPATIENT
Start: 2023-02-04 | End: 2023-02-04

## 2023-02-04 RX ORDER — ACETAMINOPHEN 500 MG
650 TABLET ORAL EVERY 6 HOURS
Refills: 0 | Status: DISCONTINUED | OUTPATIENT
Start: 2023-02-04 | End: 2023-02-09

## 2023-02-04 RX ORDER — THIAMINE MONONITRATE (VIT B1) 100 MG
100 TABLET ORAL DAILY
Refills: 0 | Status: DISCONTINUED | OUTPATIENT
Start: 2023-02-04 | End: 2023-02-09

## 2023-02-04 RX ADMIN — Medication 105 MILLIGRAM(S): at 01:46

## 2023-02-04 RX ADMIN — Medication 50 MILLIGRAM(S): at 12:40

## 2023-02-04 RX ADMIN — Medication 260 MILLIGRAM(S): at 00:33

## 2023-02-04 RX ADMIN — SODIUM CHLORIDE 150 MILLILITER(S): 9 INJECTION, SOLUTION INTRAVENOUS at 21:24

## 2023-02-04 RX ADMIN — Medication 100 MILLIGRAM(S): at 12:39

## 2023-02-04 RX ADMIN — Medication 2 MILLIGRAM(S): at 00:52

## 2023-02-04 RX ADMIN — Medication 130 MILLIGRAM(S): at 11:45

## 2023-02-04 RX ADMIN — Medication 260 MILLIGRAM(S): at 05:44

## 2023-02-04 RX ADMIN — Medication 2 MILLIGRAM(S): at 00:33

## 2023-02-04 RX ADMIN — SODIUM CHLORIDE 2000 MILLILITER(S): 9 INJECTION, SOLUTION INTRAVENOUS at 01:46

## 2023-02-04 RX ADMIN — Medication 500 MILLIGRAM(S): at 03:06

## 2023-02-04 RX ADMIN — Medication 2 MILLIGRAM(S): at 05:58

## 2023-02-04 RX ADMIN — Medication 130 MILLIGRAM(S): at 12:01

## 2023-02-04 RX ADMIN — Medication 130 MILLIGRAM(S): at 17:59

## 2023-02-04 RX ADMIN — Medication 105 MILLIGRAM(S): at 17:57

## 2023-02-04 RX ADMIN — Medication 130 MILLIGRAM(S): at 23:55

## 2023-02-04 RX ADMIN — SODIUM CHLORIDE 2000 MILLILITER(S): 9 INJECTION, SOLUTION INTRAVENOUS at 03:06

## 2023-02-04 RX ADMIN — Medication 1 MILLIGRAM(S): at 12:39

## 2023-02-04 RX ADMIN — SODIUM CHLORIDE 150 MILLILITER(S): 9 INJECTION, SOLUTION INTRAVENOUS at 17:57

## 2023-02-04 NOTE — ED PROVIDER NOTE - CLINICAL SUMMARY MEDICAL DECISION MAKING FREE TEXT BOX
alcohol withdrawal. needs fluids, thiamine, ativan, phenobarb  I read ekg as sinus tach rate 137, narrow qrs, normal axis, no st elevation or depression. alcohol withdrawal. needs fluids, thiamine, ativan, phenobarb  I read ekg as sinus tach rate 137, narrow qrs, normal axis, no st elevation or depression.  ciwa initially 8-9. admit for further treatment.
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
## 2023-02-04 NOTE — CONSULT NOTE ADULT - ASSESSMENT
43yo M with hx of EtOH abuse, recently admitted to [facility] with syncope and discharged yesterday, re-admitted to medicine today with EtOH withdrawal. Today pt with multiple code serina for aggressive/agitated behavior. ICU consulted for management of EtOH withdrawal.    --Pt seen and examined at bedside. Currently resting comfortably, awakens to voice, no withdrawal sx. Vital signs wnl.   --Would discontinue librium and treat with phenobarbital 130q6h for total 5 more doses, which will complete loading. Would not combine phenobarb with ativan and librium.   --Recommend constant observation for safety of other patients and staff given pt's agitation & aggressive behavior.   --Pt does not require ICU level of care at this time. Please reconsult if worsening in condition.    Seen and discussed with ICU attending Dr. Mojica. Plan discussed with hospitalist Dr. Soria.  43yo M with hx of EtOH abuse, recently admitted to [facility] with syncope and discharged yesterday, re-admitted to medicine today with EtOH withdrawal. Today pt with multiple code serina for aggressive/agitated behavior. ICU consulted for management of EtOH withdrawal.    --Pt seen and examined at bedside. Currently resting comfortably, awakens to voice, no withdrawal sx. Vital signs wnl.   --Would discontinue librium and treat with phenobarbital 130q6h for total 5 more doses, which will complete loading. Would not combine phenobarb with ativan and librium.   --Recommend constant observation for safety of other patients and staff given pt's agitation & aggressive behavior.   --Elevated WBC. Recommend checking UA.  --Pt does not require ICU level of care at this time. Please reconsult if worsening in condition.    Seen and discussed with ICU attending Dr. Mojica. Plan discussed with hospitalist Dr. Soria.

## 2023-02-04 NOTE — H&P ADULT - NSHPPHYSICALEXAM_GEN_ALL_CORE
August 27, 2022       HAMIDA Peres  [address] [place] [address]  Via Fax: [phone]      Patient: Yadira Hatfield   YOB: 2023   Date of Visit: 8/25/2022       Dear Dr. Skaggs:    I saw your patient, Yadira Hatfield, for an evaluation. Below are my notes for this visit with her.    If you have questions, please do not hesitate to call me.      Sincerely,        HAMIDA Combs        CC: No Recipients  Bernadette Suarez MD  8/27/2022  9:50 AM  Signed  Supervisory Addendum-Brief   I participated in the following activities of this patients care: medical decision making.   I personally performed: I was present and available to answer any questions the ANP had.   Results interpretation: I agree with the documentation of the study interpretation.       HAMIDA Combs  8/27/2022  9:50 AM  Signed  Subjective   Patient ID: Yadira is a 13 month old female.    Chief Complaint   Patient presents with   • Congestion   • Cough     Per mother, Patient has runny nose and coughing since yesterday.      Mother states that child has been congested and has a slight cough and runny nose and been very irritable lately.  She states that really all of her symptoms started yesterday.  No fever that she knew of.  She is taking fluids and urinating normally.  Decreased appetite.        Past Medical History:   Diagnosis Date   • No known problems        MEDICATIONS:  Current Outpatient Medications   Medication Sig   • amoxicillin (AMOXIL) 400 MG/5ML suspension Take 4.5 mLs by mouth in the morning and 4.5 mLs in the evening. Do all this for 10 days.     No current facility-administered medications for this visit.       ALLERGIES:  ALLERGIES:  No Known Allergies    PAST SURGICAL HISTORY:  Past Surgical History:   Procedure Laterality Date   • No past surgeries         FAMILY HISTORY:  History reviewed. No pertinent family history.    SOCIAL HISTORY:         Patient's medications, allergies,  past medical, surgical, and social history  were reviewed and updated as appropriate.    Review of Systems   HENT: Positive for congestion.    Respiratory: Positive for cough.    All other systems reviewed and are negative.      Objective   Physical Exam  Vitals and nursing note reviewed.   Constitutional:       General: She is active.      Appearance: Normal appearance. She is well-developed and normal weight.   HENT:      Head: Normocephalic and atraumatic.      Right Ear: Tympanic membrane, ear canal and external ear normal.      Left Ear: Ear canal and external ear normal. Tympanic membrane is erythematous.      Nose: Nose normal.      Mouth/Throat:      Mouth: Mucous membranes are moist.      Pharynx: Oropharynx is clear. Posterior oropharyngeal erythema present.      Neck: Normal range of motion and neck supple.   Eyes:      Extraocular Movements: Extraocular movements intact.      Conjunctiva/sclera: Conjunctivae normal.      Pupils: Pupils are equal, round, and reactive to light.   Cardiovascular:      Rate and Rhythm: Normal rate and regular rhythm.      Pulses: Normal pulses.      Heart sounds: Normal heart sounds.   Pulmonary:      Effort: Pulmonary effort is normal.      Breath sounds: Normal breath sounds.      Comments: Cough  Abdominal:      General: Bowel sounds are normal.      Palpations: Abdomen is soft.   Musculoskeletal:         General: Normal range of motion.   Skin:     General: Skin is warm and dry.      Capillary Refill: Capillary refill takes less than 2 seconds.   Neurological:      General: No focal deficit present.      Mental Status: She is alert and oriented for age.           Medical decision making:  Multiple differential diagnoses were considered. The patient was apprised of diagnostic and treatment options including alternate modes of care, in addition to risks and benefits, for this medical condition. Based on this discussion the patient agrees with this chosen diagnostic and  treatment plan.         Vitals:    08/25/22 1431   Pulse: 129   Resp: 25   Temp: 97.7 °F (36.5 °C)   TempSrc: Temporal   SpO2: 100%   Weight: 10.5 kg (23 lb 2.4 oz)        Assessment     Problem List Items Addressed This Visit    None     Visit Diagnoses     Left otitis media, unspecified otitis media type    -  Primary    Nasal congestion        Relevant Orders    2019 NOVEL CORONAVIRUS (SARS-COV-2) (Completed)           Diagnoses that have been ruled out:   None   Diagnoses that are still under consideration:   None   Final diagnoses:   1. Left otitis media, unspecified otitis media type    2. Nasal congestion        Treatment:  Results for orders placed or performed in visit on 08/25/22 2019 NOVEL CORONAVIRUS (SARS-COV-2)   Result Value Ref Range    SARS-CoV-2 by PCR Detected (A) Not Detected / Detected / Inhibitor Present    Procedural Notes       Positive for SARS-CoV-2 (2019-nCoV) nucleic acid in the specimen.    A positive result presumptively identifies Coronavirus (COVID-19) infection BUT should not be used as sole basis for treatment or patient management decisions; results must be combined with clinical observations, patient history, and epidemiological information.    This result was obtained by TMA based method and analysis by fluorescent probes designed for the qualitative detected of the SARS-CoV-2 (2019-nCoV) nucleic acid.  Performance characteristics for the test has been determined by [facility] and are incorporated as part of FDA Emergency Use Authorization (EUA).    This test was performed by [facility] using the FDA cleared Emergency Use Authorization (EUA) Aptima SARS-CoV-2 (2019-nCoV) from [facility].  This laboratory is certified under the Clinical Laboratory Improvement Amendments (CLIA) as qualified to perform high complexity clinical laboratory testing.    Some medical conditions may benefit from monoclonal antibody therapy. Talk to your provider about monoclonal antibody therapy and if it  is right for you. This treatment is approved by the FDA for emergency use and may help your immune system respond to the virus more effectively.    COVID-19 Test Results - What you need to know and do            Plan:  Vaporizer for nasal congestion  Children's Tylenol every 4 hours as needed for pain or fever over 100.5  Children's ibuprofen every 6 hours needed for pain or fever.  Take with food  Start the antibiotic and take as directed for the left ear infection  Saline nasal drops to the nose as needed for nasal congestion        Instructions provided as documented in the AVS.    Thank you for visiting [facility]     constitutional: tremor, mild agitation   HEENT: PERRLA EOMI  CV: RRR S1S2  Pulm: CTA b/l  GI: soft nontender nondistended + BS   Neuro: CN II-XII grossly intact   Musculoskeletal: no pedal edema or calf tenderness b/l

## 2023-02-04 NOTE — CONSULT NOTE ADULT - SUBJECTIVE AND OBJECTIVE BOX
HPI:  41 yo m with h/o alcohol use , discarged yesterday after treatment for syncope and electrolyte disorder presents today in withdrawal has not had a drink in 4 days. per notes from past admission, ciwa protocol was initiated but score was too low to start ativan. pt is confused and cant provide reliable hx. initial ciwa 9 now improved to 5 after ativan and phenobarb. ct head and face unremarkable for fracture or bleed. labs unchanged from discharge however lipase was checked and is 690. US Abd feb 2 did not show pancreatitis. initial vitals 165/91, 147 bpm.  (04 Feb 2023 07:46)  On 2/4 daytime pt with multiple code serina for aggressive/agitated behavior. Given additional phenobarb. ICU consulted for management of EtOH withdrawal.    Allergies  No Known Allergies    melatonin 3 milliGRAM(s) Oral at bedtime PRN Insomnia  ondansetron Injectable 4 milliGRAM(s) IV Push every 8 hours PRN Nausea and/or Vomiting  PHENobarbital Injectable 130 milliGRAM(s) IV Push every 6 hours  folic acid 1 milliGRAM(s) Oral daily  lactated ringers. 1000 milliLiter(s) IV Continuous <Continuous>  thiamine 100 milliGRAM(s) Oral daily    Drug Dosing Weight  Height (cm): 180.3 (04 Feb 2023 05:44)  Weight (kg): 68 (04 Feb 2023 05:44)  BMI (kg/m2): 20.9 (04 Feb 2023 05:44)  BSA (m2): 1.87 (04 Feb 2023 05:44)    PAST MEDICAL & SURGICAL HISTORY:  No pertinent past medical history  No significant past surgical history    REVIEW OF SYSTEMS: Unable to obtain due to pt's cognitive status.     Vital Signs Last 24 Hrs  T(C): 36.3 (04 Feb 2023 11:56), Max: 36.8 (04 Feb 2023 02:00)  T(F): 97.4 (04 Feb 2023 11:56), Max: 98.2 (04 Feb 2023 02:00)  HR: 117 (04 Feb 2023 11:56) (99 - 147)  BP: 126/86 (04 Feb 2023 11:56) (126/86 - 165/91)  RR: 17 (04 Feb 2023 11:56) (14 - 17)  SpO2: 100% (04 Feb 2023 11:56) (99% - 100%)    O2 Parameters below as of 04 Feb 2023 11:56  Patient On (Oxygen Delivery Method): room air    PHYSICAL EXAM  GENERAL: Pt lying in bed in NAD, well-groomed, well-developed.  HEENT: Normocephalic, b/l periorbital ecchymosis, dry mucous membranes. Neck supple, FROM, no JVD.   NERVOUS SYSTEM: Awakens to voice, AAO to self, not answering other questions  CHEST/LUNG: CTABL, no w/r/r  HEART: +S1S2, RRR, no m/r/g  ABDOMEN: Soft, nontender, nondistended; +BS  EXTREMITIES:  2+ peripheral pulses; no c/c/e    LABS:                        12.0   10.53 )-----------( 120      ( 04 Feb 2023 00:30 )             36.1     144  |  107  |  15  ----------------------------<  87  4.0   |  23  |  1.17    Ca    9.9      04 Feb 2023 00:30  Phos  4.8     02-04  Mg     1.6     02-04    TPro  8.2  /  Alb  4.1  /  TBili  0.5  /  DBili  x   /  AST  85<H>  /  ALT  47  /  AlkPhos  115  02-04    POCT Blood Glucose.: 98 mg/dL (03 Feb 2023 23:50)

## 2023-02-04 NOTE — ED PROVIDER NOTE - PHYSICAL EXAMINATION
Gen: Alert, NAD  Head: left brow hematoma with minimal bleeding.    Eyes: PERRL, EOMI, normal lids. rei-orbital ecchymosis   ENT: normal hearing, patent oropharynx without erythema/exudate, uvula midline  Neck: supple, no tenderness, Trachea midline  Pulm: Bilateral BS, normal resp effort, no wheeze/stridor/retractions  CV: RRR, no M/R/G, 2+ radial and dp pulses bl, no edema  Abd: soft, NT/ND, +BS, no hepatosplenomegaly  Mskel: extremities x4 with normal ROM and no joint effusions. no ctl spine ttp.   Skin: bl knee abrasions   Neuro: AAOx3, no sensory/motor deficits, CN 2-12 intact

## 2023-02-04 NOTE — CHART NOTE - NSCHARTNOTEFT_GEN_A_CORE
43 yo m with h/o alcohol use , discarged yesterday after treatment for syncope and electrolyte disorder presents today in withdrawal has not had a drink in 4 days. per notes from past admission, ciwa protocol was initiated but score was too low to start ativan. pt is confused and cant provide reliable hx. initial ciwa 9 now improved to 5 after ativan and phenobarb. ct head and face unremarkable for fracture or bleed. labs unchanged from discharge however lipase was checked and is 690. us Abd feb 2 did not show pancreatitis. initial vitals 165/91, 147 bpm.     acute on chronic metabolic encephalopathy   acute etoh withdrawal   etoh abuse   chronic or mild acute pancreatitis   -ciwa protocol with librium, ICU consulted   -regular diet   -scds  -pt eval  -thiamine, folic acid   -LR IV hydration 41 yo m with h/o alcohol use , discarged yesterday after treatment for syncope and electrolyte disorder presents today in withdrawal has not had a drink in 4 days. per notes from past admission, ciwa protocol was initiated but score was too low to start ativan. pt is confused and cant provide reliable hx. initial ciwa 9 now improved to 5 after ativan and phenobarb. ct head and face unremarkable for fracture or bleed.  Pt seen and examined.  ICU consulted for high CIWA , recc switch to phenobarb, close monitoring, 1: 1.    acute on chronic metabolic encephalopathy   acute etoh withdrawal   etoh abuse   chronic or mild acute pancreatitis   -ciwa protocol, ICU consulted , upgrade to tele  -regular diet   -scds  -pt eval  -thiamine, folic acid   -LR IV hydration

## 2023-02-04 NOTE — CONSULT NOTE ADULT - NS ATTEND AMEND GEN_ALL_CORE FT
41yo M with hx of EtOH abuse, recently admitted to [facility] with syncope and discharged yesterday, re-admitted to medicine today with EtOH withdrawal. Today pt with multiple code serina for aggressive/agitated behavior. ICU consulted for management of EtOH withdrawal.  Agree with overall management right now except would not combine multiple sedative agents.  He does seem to be a risk of self harm and harm of others, If he decompensates from here at all would admit to [facility]  Otherwise would continue with phenobarb q6 130.  Reconsult PRN. low threshold for ICU admission

## 2023-02-04 NOTE — PATIENT PROFILE ADULT - FALL HARM RISK - HARM RISK INTERVENTIONS

## 2023-02-04 NOTE — H&P ADULT - ASSESSMENT
41 yo m with h/o alcohol use , discarged yesterday after treatment for syncope and electrolyte disorder presents today in withdrawal has not had a drink in 4 days. per notes from past admission, ciwa protocol was initiated but score was too low to start ativan. pt is confused and cant provide reliable hx. initial ciwa 9 now improved to 5 after ativan and phenobarb. ct head and face unremarkable for fracture or bleed. labs unchanged from discharge however lipase was checked and is 690. us Abd feb 2 did not show pancreatitis. initial vitals 165/91, 147 bpm.     acute on chronic metabolic encephalopathy   acute etoh withdrawal   etoh abuse   -ciwa protocol with librium  -regular diet   -scds  -pt eval 43 yo m with h/o alcohol use , discarged yesterday after treatment for syncope and electrolyte disorder presents today in withdrawal has not had a drink in 4 days. per notes from past admission, ciwa protocol was initiated but score was too low to start ativan. pt is confused and cant provide reliable hx. initial ciwa 9 now improved to 5 after ativan and phenobarb. ct head and face unremarkable for fracture or bleed. labs unchanged from discharge however lipase was checked and is 690. us Abd feb 2 did not show pancreatitis. initial vitals 165/91, 147 bpm.     acute on chronic metabolic encephalopathy   acute etoh withdrawal   etoh abuse   -ciwa protocol with librium  -regular diet   -scds  -pt eval  -ivf, thiamine, folic acid  41 yo m with h/o alcohol use , discarged yesterday after treatment for syncope and electrolyte disorder presents today in withdrawal has not had a drink in 4 days. per notes from past admission, ciwa protocol was initiated but score was too low to start ativan. pt is confused and cant provide reliable hx. initial ciwa 9 now improved to 5 after ativan and phenobarb. ct head and face unremarkable for fracture or bleed. labs unchanged from discharge however lipase was checked and is 690. us Abd feb 2 did not show pancreatitis. initial vitals 165/91, 147 bpm.     acute on chronic metabolic encephalopathy   acute etoh withdrawal   etoh abuse   chronic or mild acute pancreatitis   -ciwa protocol with librium  -regular diet   -scds  -pt eval  -thiamine, folic acid   -LR IV hydration

## 2023-02-05 LAB
ALBUMIN SERPL ELPH-MCNC: 3.1 G/DL — LOW (ref 3.3–5)
ALP SERPL-CCNC: 68 U/L — SIGNIFICANT CHANGE UP (ref 40–120)
ALT FLD-CCNC: 35 U/L — SIGNIFICANT CHANGE UP (ref 12–78)
ANION GAP SERPL CALC-SCNC: 15 MMOL/L — SIGNIFICANT CHANGE UP (ref 5–17)
AST SERPL-CCNC: 78 U/L — HIGH (ref 15–37)
BASOPHILS # BLD AUTO: 0.06 K/UL — SIGNIFICANT CHANGE UP (ref 0–0.2)
BASOPHILS NFR BLD AUTO: 0.8 % — SIGNIFICANT CHANGE UP (ref 0–2)
BILIRUB SERPL-MCNC: 0.7 MG/DL — SIGNIFICANT CHANGE UP (ref 0.2–1.2)
BUN SERPL-MCNC: 7 MG/DL — SIGNIFICANT CHANGE UP (ref 7–23)
CALCIUM SERPL-MCNC: 8.6 MG/DL — SIGNIFICANT CHANGE UP (ref 8.5–10.1)
CHLORIDE SERPL-SCNC: 104 MMOL/L — SIGNIFICANT CHANGE UP (ref 96–108)
CO2 SERPL-SCNC: 21 MMOL/L — LOW (ref 22–31)
CREAT SERPL-MCNC: 0.68 MG/DL — SIGNIFICANT CHANGE UP (ref 0.5–1.3)
EGFR: 118 ML/MIN/1.73M2 — SIGNIFICANT CHANGE UP
EOSINOPHIL # BLD AUTO: 0.01 K/UL — SIGNIFICANT CHANGE UP (ref 0–0.5)
EOSINOPHIL NFR BLD AUTO: 0.1 % — SIGNIFICANT CHANGE UP (ref 0–6)
GLUCOSE SERPL-MCNC: 57 MG/DL — LOW (ref 70–99)
HCT VFR BLD CALC: 31.3 % — LOW (ref 39–50)
HGB BLD-MCNC: 10.2 G/DL — LOW (ref 13–17)
IMM GRANULOCYTES NFR BLD AUTO: 1.3 % — HIGH (ref 0–0.9)
LYMPHOCYTES # BLD AUTO: 1.37 K/UL — SIGNIFICANT CHANGE UP (ref 1–3.3)
LYMPHOCYTES # BLD AUTO: 18 % — SIGNIFICANT CHANGE UP (ref 13–44)
MCHC RBC-ENTMCNC: 32.6 G/DL — SIGNIFICANT CHANGE UP (ref 32–36)
MCHC RBC-ENTMCNC: 33.4 PG — SIGNIFICANT CHANGE UP (ref 27–34)
MCV RBC AUTO: 102.6 FL — HIGH (ref 80–100)
MONOCYTES # BLD AUTO: 1.29 K/UL — HIGH (ref 0–0.9)
MONOCYTES NFR BLD AUTO: 16.9 % — HIGH (ref 2–14)
NEUTROPHILS # BLD AUTO: 4.79 K/UL — SIGNIFICANT CHANGE UP (ref 1.8–7.4)
NEUTROPHILS NFR BLD AUTO: 62.9 % — SIGNIFICANT CHANGE UP (ref 43–77)
NRBC # BLD: 0 /100 WBCS — SIGNIFICANT CHANGE UP (ref 0–0)
PLATELET # BLD AUTO: 146 K/UL — LOW (ref 150–400)
POTASSIUM SERPL-MCNC: 3 MMOL/L — LOW (ref 3.5–5.3)
POTASSIUM SERPL-SCNC: 3 MMOL/L — LOW (ref 3.5–5.3)
PROT SERPL-MCNC: 6.8 GM/DL — SIGNIFICANT CHANGE UP (ref 6–8.3)
RBC # BLD: 3.05 M/UL — LOW (ref 4.2–5.8)
RBC # FLD: 13.5 % — SIGNIFICANT CHANGE UP (ref 10.3–14.5)
SODIUM SERPL-SCNC: 140 MMOL/L — SIGNIFICANT CHANGE UP (ref 135–145)
WBC # BLD: 7.62 K/UL — SIGNIFICANT CHANGE UP (ref 3.8–10.5)
WBC # FLD AUTO: 7.62 K/UL — SIGNIFICANT CHANGE UP (ref 3.8–10.5)

## 2023-02-05 RX ORDER — SODIUM CHLORIDE 9 MG/ML
1000 INJECTION, SOLUTION INTRAVENOUS
Refills: 0 | Status: DISCONTINUED | OUTPATIENT
Start: 2023-02-05 | End: 2023-02-06

## 2023-02-05 RX ORDER — POTASSIUM CHLORIDE 20 MEQ
40 PACKET (EA) ORAL ONCE
Refills: 0 | Status: COMPLETED | OUTPATIENT
Start: 2023-02-05 | End: 2023-02-05

## 2023-02-05 RX ORDER — POTASSIUM CHLORIDE 20 MEQ
10 PACKET (EA) ORAL
Refills: 0 | Status: COMPLETED | OUTPATIENT
Start: 2023-02-05 | End: 2023-02-05

## 2023-02-05 RX ADMIN — SODIUM CHLORIDE 75 MILLILITER(S): 9 INJECTION, SOLUTION INTRAVENOUS at 10:09

## 2023-02-05 RX ADMIN — Medication 130 MILLIGRAM(S): at 11:30

## 2023-02-05 RX ADMIN — Medication 100 MILLIEQUIVALENT(S): at 10:10

## 2023-02-05 RX ADMIN — Medication 130 MILLIGRAM(S): at 06:11

## 2023-02-05 RX ADMIN — Medication 100 MILLIEQUIVALENT(S): at 12:20

## 2023-02-05 RX ADMIN — SODIUM CHLORIDE 150 MILLILITER(S): 9 INJECTION, SOLUTION INTRAVENOUS at 03:21

## 2023-02-05 RX ADMIN — Medication 100 MILLIEQUIVALENT(S): at 13:28

## 2023-02-06 LAB
ALBUMIN SERPL ELPH-MCNC: 2.9 G/DL — LOW (ref 3.3–5)
ALP SERPL-CCNC: 77 U/L — SIGNIFICANT CHANGE UP (ref 40–120)
ALT FLD-CCNC: 33 U/L — SIGNIFICANT CHANGE UP (ref 12–78)
ANION GAP SERPL CALC-SCNC: 11 MMOL/L — SIGNIFICANT CHANGE UP (ref 5–17)
ANION GAP SERPL CALC-SCNC: 9 MMOL/L — SIGNIFICANT CHANGE UP (ref 5–17)
AST SERPL-CCNC: 63 U/L — HIGH (ref 15–37)
BILIRUB SERPL-MCNC: 0.3 MG/DL — SIGNIFICANT CHANGE UP (ref 0.2–1.2)
BUN SERPL-MCNC: 3 MG/DL — LOW (ref 7–23)
BUN SERPL-MCNC: 7 MG/DL — SIGNIFICANT CHANGE UP (ref 7–23)
CALCIUM SERPL-MCNC: 8.6 MG/DL — SIGNIFICANT CHANGE UP (ref 8.5–10.1)
CALCIUM SERPL-MCNC: 8.6 MG/DL — SIGNIFICANT CHANGE UP (ref 8.5–10.1)
CHLORIDE SERPL-SCNC: 102 MMOL/L — SIGNIFICANT CHANGE UP (ref 96–108)
CHLORIDE SERPL-SCNC: 104 MMOL/L — SIGNIFICANT CHANGE UP (ref 96–108)
CO2 SERPL-SCNC: 24 MMOL/L — SIGNIFICANT CHANGE UP (ref 22–31)
CO2 SERPL-SCNC: 29 MMOL/L — SIGNIFICANT CHANGE UP (ref 22–31)
CREAT SERPL-MCNC: 0.57 MG/DL — SIGNIFICANT CHANGE UP (ref 0.5–1.3)
CREAT SERPL-MCNC: 0.87 MG/DL — SIGNIFICANT CHANGE UP (ref 0.5–1.3)
EGFR: 109 ML/MIN/1.73M2 — SIGNIFICANT CHANGE UP
EGFR: 124 ML/MIN/1.73M2 — SIGNIFICANT CHANGE UP
GLUCOSE SERPL-MCNC: 103 MG/DL — HIGH (ref 70–99)
GLUCOSE SERPL-MCNC: 129 MG/DL — HIGH (ref 70–99)
MAGNESIUM SERPL-MCNC: 1.6 MG/DL — SIGNIFICANT CHANGE UP (ref 1.6–2.6)
PHOSPHATE SERPL-MCNC: 2.7 MG/DL — SIGNIFICANT CHANGE UP (ref 2.5–4.5)
POTASSIUM SERPL-MCNC: 2.9 MMOL/L — CRITICAL LOW (ref 3.5–5.3)
POTASSIUM SERPL-MCNC: 3.6 MMOL/L — SIGNIFICANT CHANGE UP (ref 3.5–5.3)
POTASSIUM SERPL-SCNC: 2.9 MMOL/L — CRITICAL LOW (ref 3.5–5.3)
POTASSIUM SERPL-SCNC: 3.6 MMOL/L — SIGNIFICANT CHANGE UP (ref 3.5–5.3)
PROT SERPL-MCNC: 6.8 GM/DL — SIGNIFICANT CHANGE UP (ref 6–8.3)
SODIUM SERPL-SCNC: 139 MMOL/L — SIGNIFICANT CHANGE UP (ref 135–145)
SODIUM SERPL-SCNC: 140 MMOL/L — SIGNIFICANT CHANGE UP (ref 135–145)

## 2023-02-06 RX ORDER — POTASSIUM CHLORIDE 20 MEQ
10 PACKET (EA) ORAL
Refills: 0 | Status: COMPLETED | OUTPATIENT
Start: 2023-02-06 | End: 2023-02-06

## 2023-02-06 RX ORDER — SODIUM CHLORIDE 9 MG/ML
1000 INJECTION, SOLUTION INTRAVENOUS
Refills: 0 | Status: DISCONTINUED | OUTPATIENT
Start: 2023-02-06 | End: 2023-02-08

## 2023-02-06 RX ORDER — POTASSIUM PHOSPHATE, MONOBASIC POTASSIUM PHOSPHATE, DIBASIC 236; 224 MG/ML; MG/ML
30 INJECTION, SOLUTION INTRAVENOUS ONCE
Refills: 0 | Status: COMPLETED | OUTPATIENT
Start: 2023-02-06 | End: 2023-02-06

## 2023-02-06 RX ADMIN — Medication 100 MILLIEQUIVALENT(S): at 13:29

## 2023-02-06 RX ADMIN — Medication 100 MILLIGRAM(S): at 11:24

## 2023-02-06 RX ADMIN — Medication 100 MILLIEQUIVALENT(S): at 09:10

## 2023-02-06 RX ADMIN — SODIUM CHLORIDE 75 MILLILITER(S): 9 INJECTION, SOLUTION INTRAVENOUS at 17:24

## 2023-02-06 RX ADMIN — Medication 650 MILLIGRAM(S): at 22:42

## 2023-02-06 RX ADMIN — Medication 100 MILLIEQUIVALENT(S): at 11:18

## 2023-02-06 RX ADMIN — SODIUM CHLORIDE 75 MILLILITER(S): 9 INJECTION, SOLUTION INTRAVENOUS at 04:25

## 2023-02-06 RX ADMIN — Medication 1 MILLIGRAM(S): at 11:18

## 2023-02-06 RX ADMIN — POTASSIUM PHOSPHATE, MONOBASIC POTASSIUM PHOSPHATE, DIBASIC 83.33 MILLIMOLE(S): 236; 224 INJECTION, SOLUTION INTRAVENOUS at 14:53

## 2023-02-07 LAB
ALBUMIN SERPL ELPH-MCNC: 3.1 G/DL — LOW (ref 3.3–5)
ALP SERPL-CCNC: 77 U/L — SIGNIFICANT CHANGE UP (ref 40–120)
ALT FLD-CCNC: 31 U/L — SIGNIFICANT CHANGE UP (ref 12–78)
ANION GAP SERPL CALC-SCNC: 7 MMOL/L — SIGNIFICANT CHANGE UP (ref 5–17)
AST SERPL-CCNC: 56 U/L — HIGH (ref 15–37)
BILIRUB SERPL-MCNC: 0.3 MG/DL — SIGNIFICANT CHANGE UP (ref 0.2–1.2)
BUN SERPL-MCNC: 6 MG/DL — LOW (ref 7–23)
CALCIUM SERPL-MCNC: 9.1 MG/DL — SIGNIFICANT CHANGE UP (ref 8.5–10.1)
CHLORIDE SERPL-SCNC: 105 MMOL/L — SIGNIFICANT CHANGE UP (ref 96–108)
CO2 SERPL-SCNC: 28 MMOL/L — SIGNIFICANT CHANGE UP (ref 22–31)
CREAT SERPL-MCNC: 0.73 MG/DL — SIGNIFICANT CHANGE UP (ref 0.5–1.3)
EGFR: 115 ML/MIN/1.73M2 — SIGNIFICANT CHANGE UP
GLUCOSE SERPL-MCNC: 93 MG/DL — SIGNIFICANT CHANGE UP (ref 70–99)
HCT VFR BLD CALC: 30.6 % — LOW (ref 39–50)
HGB BLD-MCNC: 10 G/DL — LOW (ref 13–17)
MAGNESIUM SERPL-MCNC: 1.8 MG/DL — SIGNIFICANT CHANGE UP (ref 1.6–2.6)
MCHC RBC-ENTMCNC: 32.4 PG — SIGNIFICANT CHANGE UP (ref 27–34)
MCHC RBC-ENTMCNC: 32.7 G/DL — SIGNIFICANT CHANGE UP (ref 32–36)
MCV RBC AUTO: 99 FL — SIGNIFICANT CHANGE UP (ref 80–100)
NRBC # BLD: 0 /100 WBCS — SIGNIFICANT CHANGE UP (ref 0–0)
PHOSPHATE SERPL-MCNC: 3.3 MG/DL — SIGNIFICANT CHANGE UP (ref 2.5–4.5)
PLATELET # BLD AUTO: 241 K/UL — SIGNIFICANT CHANGE UP (ref 150–400)
POTASSIUM SERPL-MCNC: 3.5 MMOL/L — SIGNIFICANT CHANGE UP (ref 3.5–5.3)
POTASSIUM SERPL-SCNC: 3.5 MMOL/L — SIGNIFICANT CHANGE UP (ref 3.5–5.3)
PROT SERPL-MCNC: 7 GM/DL — SIGNIFICANT CHANGE UP (ref 6–8.3)
RBC # BLD: 3.09 M/UL — LOW (ref 4.2–5.8)
RBC # FLD: 13.2 % — SIGNIFICANT CHANGE UP (ref 10.3–14.5)
SODIUM SERPL-SCNC: 140 MMOL/L — SIGNIFICANT CHANGE UP (ref 135–145)
WBC # BLD: 4.91 K/UL — SIGNIFICANT CHANGE UP (ref 3.8–10.5)
WBC # FLD AUTO: 4.91 K/UL — SIGNIFICANT CHANGE UP (ref 3.8–10.5)

## 2023-02-07 RX ORDER — BACITRACIN ZINC 500 UNIT/G
1 OINTMENT IN PACKET (EA) TOPICAL DAILY
Refills: 0 | Status: DISCONTINUED | OUTPATIENT
Start: 2023-02-07 | End: 2023-02-09

## 2023-02-07 RX ADMIN — Medication 3 MILLIGRAM(S): at 23:26

## 2023-02-07 RX ADMIN — Medication 1 TABLET(S): at 17:52

## 2023-02-07 RX ADMIN — Medication 1 MILLIGRAM(S): at 12:26

## 2023-02-07 RX ADMIN — Medication 100 MILLIGRAM(S): at 08:58

## 2023-02-07 RX ADMIN — SODIUM CHLORIDE 75 MILLILITER(S): 9 INJECTION, SOLUTION INTRAVENOUS at 18:03

## 2023-02-07 RX ADMIN — Medication 1 APPLICATION(S): at 17:52

## 2023-02-07 RX ADMIN — Medication 650 MILLIGRAM(S): at 23:26

## 2023-02-07 RX ADMIN — Medication 100 MILLIGRAM(S): at 12:26

## 2023-02-07 RX ADMIN — Medication 650 MILLIGRAM(S): at 23:00

## 2023-02-07 NOTE — PHYSICAL THERAPY INITIAL EVALUATION ADULT - CRITERIA FOR SKILLED THERAPEUTIC INTERVENTIONS
[>50% of Time Spent on Counseling for ____] : Greater than 50% of the encounter time was spent on counseling for [unfilled] [Time Spent: ___ minutes] : I have spent [unfilled] minutes of face to face time with the patient impairments found

## 2023-02-07 NOTE — PROGRESS NOTE ADULT - PROBLEM SELECTOR PROBLEM 2
Alcohol dependence with withdrawal

## 2023-02-08 DIAGNOSIS — E83.42 HYPOMAGNESEMIA: ICD-10-CM

## 2023-02-08 DIAGNOSIS — F10.20 ALCOHOL DEPENDENCE, UNCOMPLICATED: ICD-10-CM

## 2023-02-08 DIAGNOSIS — R55 SYNCOPE AND COLLAPSE: ICD-10-CM

## 2023-02-08 DIAGNOSIS — F12.20 CANNABIS DEPENDENCE, UNCOMPLICATED: ICD-10-CM

## 2023-02-08 DIAGNOSIS — Z59.00 HOMELESSNESS UNSPECIFIED: ICD-10-CM

## 2023-02-08 DIAGNOSIS — Z74.3 NEED FOR CONTINUOUS SUPERVISION: ICD-10-CM

## 2023-02-08 DIAGNOSIS — F17.210 NICOTINE DEPENDENCE, CIGARETTES, UNCOMPLICATED: ICD-10-CM

## 2023-02-08 DIAGNOSIS — D69.59 OTHER SECONDARY THROMBOCYTOPENIA: ICD-10-CM

## 2023-02-08 DIAGNOSIS — E87.8 OTHER DISORDERS OF ELECTROLYTE AND FLUID BALANCE, NOT ELSEWHERE CLASSIFIED: ICD-10-CM

## 2023-02-08 DIAGNOSIS — E86.0 DEHYDRATION: ICD-10-CM

## 2023-02-08 LAB
ALBUMIN SERPL ELPH-MCNC: 3.2 G/DL — LOW (ref 3.3–5)
ALP SERPL-CCNC: 73 U/L — SIGNIFICANT CHANGE UP (ref 40–120)
ALT FLD-CCNC: 34 U/L — SIGNIFICANT CHANGE UP (ref 12–78)
ANION GAP SERPL CALC-SCNC: 8 MMOL/L — SIGNIFICANT CHANGE UP (ref 5–17)
AST SERPL-CCNC: 53 U/L — HIGH (ref 15–37)
BILIRUB SERPL-MCNC: 0.3 MG/DL — SIGNIFICANT CHANGE UP (ref 0.2–1.2)
BUN SERPL-MCNC: 9 MG/DL — SIGNIFICANT CHANGE UP (ref 7–23)
CALCIUM SERPL-MCNC: 9.4 MG/DL — SIGNIFICANT CHANGE UP (ref 8.5–10.1)
CHLORIDE SERPL-SCNC: 108 MMOL/L — SIGNIFICANT CHANGE UP (ref 96–108)
CO2 SERPL-SCNC: 28 MMOL/L — SIGNIFICANT CHANGE UP (ref 22–31)
CREAT SERPL-MCNC: 0.63 MG/DL — SIGNIFICANT CHANGE UP (ref 0.5–1.3)
EGFR: 120 ML/MIN/1.73M2 — SIGNIFICANT CHANGE UP
GLUCOSE SERPL-MCNC: 104 MG/DL — HIGH (ref 70–99)
HCT VFR BLD CALC: 30.2 % — LOW (ref 39–50)
HGB BLD-MCNC: 9.8 G/DL — LOW (ref 13–17)
LIDOCAIN IGE QN: 678 U/L — HIGH (ref 73–393)
MAGNESIUM SERPL-MCNC: 2 MG/DL — SIGNIFICANT CHANGE UP (ref 1.6–2.6)
MCHC RBC-ENTMCNC: 32.5 G/DL — SIGNIFICANT CHANGE UP (ref 32–36)
MCHC RBC-ENTMCNC: 33.1 PG — SIGNIFICANT CHANGE UP (ref 27–34)
MCV RBC AUTO: 102 FL — HIGH (ref 80–100)
NRBC # BLD: 0 /100 WBCS — SIGNIFICANT CHANGE UP (ref 0–0)
PHOSPHATE SERPL-MCNC: 4.2 MG/DL — SIGNIFICANT CHANGE UP (ref 2.5–4.5)
PLATELET # BLD AUTO: 313 K/UL — SIGNIFICANT CHANGE UP (ref 150–400)
POTASSIUM SERPL-MCNC: 3.9 MMOL/L — SIGNIFICANT CHANGE UP (ref 3.5–5.3)
POTASSIUM SERPL-SCNC: 3.9 MMOL/L — SIGNIFICANT CHANGE UP (ref 3.5–5.3)
PROT SERPL-MCNC: 7.3 GM/DL — SIGNIFICANT CHANGE UP (ref 6–8.3)
RBC # BLD: 2.96 M/UL — LOW (ref 4.2–5.8)
RBC # FLD: 13.2 % — SIGNIFICANT CHANGE UP (ref 10.3–14.5)
SODIUM SERPL-SCNC: 144 MMOL/L — SIGNIFICANT CHANGE UP (ref 135–145)
WBC # BLD: 4.06 K/UL — SIGNIFICANT CHANGE UP (ref 3.8–10.5)
WBC # FLD AUTO: 4.06 K/UL — SIGNIFICANT CHANGE UP (ref 3.8–10.5)

## 2023-02-08 RX ADMIN — Medication 650 MILLIGRAM(S): at 22:09

## 2023-02-08 RX ADMIN — Medication 650 MILLIGRAM(S): at 12:28

## 2023-02-08 RX ADMIN — Medication 1 TABLET(S): at 12:28

## 2023-02-08 RX ADMIN — Medication 1 MILLIGRAM(S): at 12:27

## 2023-02-08 RX ADMIN — Medication 650 MILLIGRAM(S): at 13:00

## 2023-02-08 RX ADMIN — Medication 100 MILLIGRAM(S): at 12:28

## 2023-02-08 RX ADMIN — Medication 1 APPLICATION(S): at 12:28

## 2023-02-08 SDOH — ECONOMIC STABILITY - HOUSING INSECURITY: HOMELESSNESS UNSPECIFIED: Z59.00

## 2023-02-08 NOTE — PATIENT PROFILE ADULT - NSPROPTRIGHTNOTIFY_GEN_A_NUR
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  This patient is known to us and has been seen by us in many hospitalizations.  Her nursing home center overnight due to her chest tube being dislodged.  This chest tube is been in place for several months due to chronic and multidrug-resistant empyema.  It apparently became dislodged during a mild cough.  Other than this, patient denies any symptoms including dyspnea, cough, wheeze, sputum production, or chest pain or tightness.  She does note weakness which is not new.  Daughter present in room and update provided.         Review of Systems     Review of Systems     No chills, fevers, or sweats. No nausea, vomiting, or diarrhea. No urinary symptoms. No new joint pain or skin changes.      Medications Prior to Admission   Medication Sig Dispense Refill   • Ascorbic Acid (vitamin C) 1000 MG tablet 1,000 mg daily. Via J tube     • melatonin 3 MG 1 tablet by Per J Tube route nightly.     • ondansetron (ZOFRAN) 4 MG tablet 1 tablet every 8 hours as needed for Nausea (vomiting). Via J-tube     • QUEtiapine (SEROquel) 25 MG tablet 25 mg by Per G Tube route nightly.     • atropine (ISOPTO ATROPINE) 1 % ophthalmic solution Place 2 drops under the tongue every 4 hours as needed (Excess Secretions and congestion management). Indications: Prevention of Secretions of the Respiratory Tract     • magnesium hydroxide (MILK OF MAGNESIA) 400 MG/5ML suspension 30 mLs daily as needed for Constipation. Via J-Tube     • enoxaparin (LOVENOX) 30 MG/0.3ML injectable solution Inject 0.3 mLs into the skin every 24  hours. 30 each 0   • nystatin (MYCOSTATIN) 880287 UNIT/GM powder Apply 1 application topically every evening.     • omeprazole (PrilOSEC) 20 MG capsule 20 mg daily. Via J tube     • hydroCORTisone (ANUSOL-HC) 25 MG suppository Place 1 suppository rectally in the morning and 1 suppository in the evening. (Patient taking differently: Place 25 mg rectally 2 times daily as needed for Hemorrhoids.)     • albuterol (VENTOLIN) (2.5 MG/3ML) 0.083% nebulizer solution Take 3 mLs by nebulization every 6 hours as needed for Wheezing or Shortness of Breath. 375 mL 11   • calamine-zinc oxide 8-8 % lotion Apply 1 application topically daily as needed (skin irritation, jtube site and neck area). To J-tube site and neck area for skin condition     • dilTIAZem (CARDIZEM) 60 MG tablet 1 tablet by Per J Tube route 4 times daily. 120 tablet 11   • FA-Pyridoxine-Cyanocobalamin (Folbic) 2.5-25-2 MG Tab 1 tablet by Per J Tube route daily. (Patient taking differently: 1 tablet by Per G Tube route daily.) 30 tablet 5   • acetaminophen (TYLENOL) 325 MG tablet 650 mg by Per G Tube route every 4 hours as needed for Pain or Fever.         Allergies   ALLERGIES:  Sulfa antibiotics, Ceftolozane-tazobactam, Ceftriaxone, Daptomycin, Lorazepam, Polymyxin b, Cefepime, Metronidazole, and Vancomycin    Past Medical History     Past Medical History:   Diagnosis Date   • Acute encephalopathy 4/14/2020   • Adult hypertrophic pyloric stenosis    • Age-related osteoporosis without current pathological fracture    • Age-related physical debility    • Anemia in other chronic diseases classified elsewhere    • Anemia, unspecified 7/16/2020   • Boerhaave's syndrome    • Cataract    • COVID-19    • Diarrhea 8/26/2020   • Difficulty in walking, not elsewhere classified    • Drug eruption 5/3/2020   • Dysphagia, oropharyngeal phase    • Elevated white blood cell count, unspecified    • Empyema lung (CMS/HCC) 7/11/2020   • Esophageal perforation 4/14/2020   •  Esophageal rupture    • Esophageal tear, subsequent encounter 3/25/2020   • Essential (primary) hypertension    • Gastrostomy status (CMS/Formerly [facility])    • High cholesterol    • Mediastinitis    • Muscle weakness (generalized) 6/15/2020   • Other specified pleural conditions    • Other viral pneumonia    • Paroxysmal atrial fibrillation (CMS/[facility]ly [facility])    • Peptic ulcer disease    • Perforation of esophagus    • Pneumonia 8/26/2020   • Pneumonia due to COVID-19 virus 7/29/2020   • Pseudomonas aeruginosa infection 7/29/2020   • Pyothorax with fistula (CMS/[facility]ly [facility])    • Pyothorax without fistula (CMS/[facility])    • Sepsis (CMS/[facility]ly [facility]) 7/29/2020   • Unspecified severe protein-calorie malnutrition (CMS/Formerly [facility])    • Unsteadiness on feet    • Urinary incontinence         Past Surgical History     Past Surgical History:   Procedure Laterality Date   • Appendectomy     • Cataract extraction w/  intraocular lens implant     • Esophagostomy  05/08/2020    Left cervical diverting esophagostomy, Robotic-assisted exploratory laparoscopy, extensive lysis of adhesions and feeding jejunostomy tube placement by Dr. Yvon John   • Jejunostomy  05/08/2020    Left cervical diverting esophagostomy, Robotic-assisted exploratory laparoscopy, extensive lysis of adhesions and feeding jejunostomy tube placement by Dr. Yvon John   • Tonsillectomy          Social History     Social History     Tobacco Use   • Smoking status: Never   • Smokeless tobacco: Never   Vaping Use   • Vaping Use: never used   Substance Use Topics   • Alcohol use: Never   • Drug use: Never       Family History     Family History   Problem Relation Age of Onset   • COPD Father    • Cancer Brother         lung   • Dementia/Alzheimers Mother        Physical Exam     Visit Vitals  /72   Pulse 81   Temp 97.7 °F (36.5 °C)   Resp 16   Ht 5' 5\" (1.651 m)   Wt 48.4 kg (106 lb 11.2 oz)   SpO2 98%   BMI 17.76 kg/m²     Oxygen flowing at      Physical Exam    Examination of the patient reveals:      GENERAL: appears stated age, well developed and well nourished, in no distress, and normal affect  SKIN normal color, normal texture, normal turgor, no skin rashes, no atypical appearing skin lesions, and no bruises  CHEST: contour is normal, normal respiratory excursion and respiratory effort is not labored  LUNGS: Mild rhonchi at right lung base.  Appreciated site of prior chest tube insertion without induration, drainage, or tenderness at right chest wall.  No wheezing or crackles.  HEART: normal rate and rhythm, S1 and S2 normal, no S3 or S4, no murmurs, and no extra heart sounds  ABDOMEN: abdomen is soft, normal active bowel sounds, nontender, without masses, without hepatomegaly, and without splenomegaly  EXTREMITIES: no clubbing, no cyanosis, no edema, and normal muscle strength bilaterally    Relevant Results   LABS  Admission on 02/08/2023   Component Date Value Ref Range Status   • Sodium 02/08/2023 137  135 - 145 mmol/L Final   • Potassium 02/08/2023 4.1  3.4 - 5.1 mmol/L Final   • Chloride 02/08/2023 102  97 - 110 mmol/L Final   • Carbon Dioxide 02/08/2023 28  21 - 32 mmol/L Final   • Anion Gap 02/08/2023 11  7 - 19 mmol/L Final   • Glucose 02/08/2023 98  70 - 99 mg/dL Final   • BUN 02/08/2023 26 (A)  6 - 20 mg/dL Final   • Creatinine 02/08/2023 0.72  0.51 - 0.95 mg/dL Final   • Glomerular Filtration Rate 02/08/2023 82  >=60 Final   • BUN/ Creatinine Ratio 02/08/2023 36 (A)  7 - 25 Final   • Calcium 02/08/2023 10.1  8.4 - 10.2 mg/dL Final   • Bilirubin, Total 02/08/2023 0.3  0.2 - 1.0 mg/dL Final   • GOT/AST 02/08/2023 21  <=37 Units/L Final   • GPT/ALT 02/08/2023 15  <64 Units/L Final   • Alkaline Phosphatase 02/08/2023 90  45 - 117 Units/L Final   • Albumin 02/08/2023 2.4 (A)  3.6 - 5.1 g/dL Final   • Protein, Total 02/08/2023 7.7  6.4 - 8.2 g/dL Final   • Globulin 02/08/2023 5.3 (A)  2.0 - 4.0 g/dL Final   • A/G Ratio 02/08/2023 0.5 (A)  1.0 - 2.4 Final   • WBC 02/08/2023 11.0  4.2 - 11.0 K/mcL  Final   • RBC 02/08/2023 3.91 (A)  4.00 - 5.20 mil/mcL Final   • HGB 02/08/2023 8.9 (A)  12.0 - 15.5 g/dL Final   • HCT 02/08/2023 30.5 (A)  36.0 - 46.5 % Final   • MCV 02/08/2023 78.0  78.0 - 100.0 fl Final   • MCH 02/08/2023 22.8 (A)  26.0 - 34.0 pg Final   • MCHC 02/08/2023 29.2 (A)  32.0 - 36.5 g/dL Final   • RDW-CV 02/08/2023 18.5 (A)  11.0 - 15.0 % Final   • RDW-SD 02/08/2023 52.2 (A)  39.0 - 50.0 fL Final   • PLT 02/08/2023 504 (A)  140 - 450 K/mcL Final   • NRBC 02/08/2023 0  <=0 /100 WBC Final   • Neutrophil, Percent 02/08/2023 68  % Final   • Lymphocytes, Percent 02/08/2023 18  % Final   • Mono, Percent 02/08/2023 8  % Final   • Eosinophils, Percent 02/08/2023 5  % Final   • Basophils, Percent 02/08/2023 0  % Final   • Immature Granulocytes 02/08/2023 1  % Final   • Absolute Neutrophils 02/08/2023 7.6  1.8 - 7.7 K/mcL Final   • Absolute Lymphocytes 02/08/2023 2.0  1.0 - 4.0 K/mcL Final   • Absolute Monocytes 02/08/2023 0.9  0.3 - 0.9 K/mcL Final   • Absolute Eosinophils  02/08/2023 0.5  0.0 - 0.5 K/mcL Final   • Absolute Basophils 02/08/2023 0.0  0.0 - 0.3 K/mcL Final   • Absolute Immmature Granulocytes 02/08/2023 0.1  0.0 - 0.2 K/mcL Final   • Sodium 02/08/2023 138  135 - 145 mmol/L Final   • Potassium 02/08/2023 3.8  3.4 - 5.1 mmol/L Final   • Chloride 02/08/2023 104  97 - 110 mmol/L Final   • Carbon Dioxide 02/08/2023 27  21 - 32 mmol/L Final   • Anion Gap 02/08/2023 11  7 - 19 mmol/L Final   • Glucose 02/08/2023 83  70 - 99 mg/dL Final   • BUN 02/08/2023 24 (A)  6 - 20 mg/dL Final   • Creatinine 02/08/2023 0.71  0.51 - 0.95 mg/dL Final   • Glomerular Filtration Rate 02/08/2023 83  >=60 Final   • BUN/ Creatinine Ratio 02/08/2023 34 (A)  7 - 25 Final   • Calcium 02/08/2023 9.9  8.4 - 10.2 mg/dL Final   • Prothrombin Time 02/08/2023 10.7  9.7 - 11.8 sec Final   • INR 02/08/2023 1.0    Final   • GLUCOSE, BEDSIDE - POINT OF CARE 02/08/2023 91  70 - 99 mg/dL Final          IMAGING  CT CHEST W CONTRAST    Final Result      1.  Persistent small fluid and gas collection in the right lung base,   surrounded by dense consolidation, with probable persistent fistulous   connection to the distal esophagus, as described above.   2. Stable scattered patchy tree-in-bud opacities   3. Stable mediastinal lymphadenopathy.          Electronically Signed by: COLT KIRBY M.D.    Signed on: 2/8/2023 1:28 PM          XR CHEST PA OR AP 1 VIEW   Final Result   1.    Small right basilar opacity likely atelectasis and small effusion.      Electronically Signed by: DELMY MELO M.D.    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    Per IR, there is no role for repeat chest tube insertion at this point and it is best managed with serial CT       Plan   Monitor overnight for drainage from site or symptom worsening  Can discharge back to nursing home tomorrow if no decline from pulmonary standpoint  Repeat CT Chest in 3 months  Outpatient pulm f/u      DVT Prophylaxis: enoxaparin - 30 MG/0.3ML, 30 MG/0.3ML          Code Status       Code Status: Do Not Resuscitate    Inpatient Medications     Current Facility-Administered Medications   Medication   • albuterol (VENTOLIN) nebulizer 2.5 mg   • atropine (ISOPTO ATROPINE) 1 % ophthalmic solution 2 drop   • acetaminophen (TYLENOL) tablet 650 mg   • dilTIAZem (CARDIZEM) tablet 60 mg   • enoxaparin (LOVENOX) injection 30 mg   • hydroCORTisone (ANUSOL-HC) suppository 25 mg   • magnesium hydroxide (MILK OF MAGNESIA) 400 MG/5ML suspension 30 mL   • melatonin tablet 3 mg   • morphine injection 1 mg    • nystatin (MYCOSTATIN) powder 1 application   • pantoprazole (PROTONIX) 40 MG/20ML (compounded) suspension 40 mg   • QUEtiapine (SEROquel) tablet 25 mg   • sodium chloride 0.9 % flush bag 25 mL   • sodium chloride (PF) 0.9 % injection 2 mL   • sodium chloride 0.9 % flush bag 25 mL   • prochlorperazine (COMPAZINE) injection 5 mg   • polyethylene glycol (MIRALAX) packet 17 g   • dextrose 5 % / sodium chloride 0.9% infusion   • morphine injection 2 mg          information could not be obtained

## 2023-02-09 VITALS
TEMPERATURE: 97 F | HEART RATE: 84 BPM | OXYGEN SATURATION: 100 % | RESPIRATION RATE: 19 BRPM | SYSTOLIC BLOOD PRESSURE: 106 MMHG | DIASTOLIC BLOOD PRESSURE: 71 MMHG

## 2023-02-09 RX ORDER — BACITRACIN ZINC 500 UNIT/G
1 OINTMENT IN PACKET (EA) TOPICAL
Qty: 0 | Refills: 0 | DISCHARGE
Start: 2023-02-09

## 2023-02-09 RX ORDER — LANOLIN ALCOHOL/MO/W.PET/CERES
1 CREAM (GRAM) TOPICAL
Qty: 0 | Refills: 0 | DISCHARGE
Start: 2023-02-09

## 2023-02-09 RX ADMIN — Medication 100 MILLIGRAM(S): at 14:57

## 2023-02-09 RX ADMIN — Medication 1 APPLICATION(S): at 14:58

## 2023-02-09 RX ADMIN — Medication 1 MILLIGRAM(S): at 14:56

## 2023-02-09 RX ADMIN — Medication 1 TABLET(S): at 14:57

## 2023-02-09 NOTE — PROGRESS NOTE ADULT - SUBJECTIVE AND OBJECTIVE BOX
CHIEF COMPLAINT: follow up of acute alcohol withdrawal  not in withdrawal at this time  no report of any fever or n.v.d.abd pain  no dysuria   + unsteady gait reported       PHYSICAL EXAM:    GENERAL: Malnourished, no acute distress   CHEST/LUNG: Clear to ausculation bilaterally, no wheezing, no crackles   HEART: Regular rate and rhythm; No murmurs, rubs  ABDOMEN: Soft, Nontender, Nondistended; Bowel sounds present  EXTREMITIES:  Moving all four extremities spontaneously, No clubbing, cyanosis, or edema  NERVOUS SYSTEM:  Grossly non focal.  Psychiatry: AAO x 3, mood is appropriate       OBJECTIVE DATA:   Vital Signs Last 24 Hrs  T(C): 37.1 (08 Feb 2023 15:29), Max: 37.1 (08 Feb 2023 10:25)  T(F): 98.7 (08 Feb 2023 15:29), Max: 98.7 (08 Feb 2023 10:25)  HR: 77 (08 Feb 2023 15:29) (67 - 96)  BP: 98/66 (08 Feb 2023 15:29) (96/58 - 114/73)  BP(mean): --  RR: 17 (08 Feb 2023 15:29) (17 - 18)  SpO2: 99% (08 Feb 2023 15:29) (97% - 100%)    Parameters below as of 08 Feb 2023 15:29  Patient On (Oxygen Delivery Method): room air               Daily     Daily   LABS:                        9.8    4.06  )-----------( 313      ( 08 Feb 2023 07:11 )             30.2             02-08    144  |  108  |  9   ----------------------------<  104<H>  3.9   |  28  |  0.63    Ca    9.4      08 Feb 2023 07:11  Phos  4.2     02-08  Mg     2.0     02-08    TPro  7.3  /  Alb  3.2<L>  /  TBili  0.3  /  DBili  x   /  AST  53<H>  /  ALT  34  /  AlkPhos  73  02-08      MEDICATIONS  (STANDING):  bacitracin   Ointment 1 Application(s) Topical daily  folic acid 1 milliGRAM(s) Oral daily  multivitamin 1 Tablet(s) Oral daily  thiamine 100 milliGRAM(s) Oral daily    MEDICATIONS  (PRN):  acetaminophen     Tablet .. 650 milliGRAM(s) Oral every 6 hours PRN Temp greater or equal to 38C (100.4F), Mild Pain (1 - 3)  aluminum hydroxide/magnesium hydroxide/simethicone Suspension 30 milliLiter(s) Oral every 4 hours PRN Dyspepsia  melatonin 3 milliGRAM(s) Oral at bedtime PRN Insomnia  ondansetron Injectable 4 milliGRAM(s) IV Push every 8 hours PRN Nausea and/or Vomiting      
Patient is a 42y old  Male who presents with a chief complaint of EtOH Withdrawal (04 Feb 2023 17:23)      INTERVAL HPI/OVERNIGHT EVENTS:  Pt was seen and examined, no acute events.      MEDICATIONS  (STANDING):  dextrose 5% + lactated ringers. 1000 milliLiter(s) (75 mL/Hr) IV Continuous <Continuous>  folic acid 1 milliGRAM(s) Oral daily  thiamine 100 milliGRAM(s) Oral daily    MEDICATIONS  (PRN):  acetaminophen     Tablet .. 650 milliGRAM(s) Oral every 6 hours PRN Temp greater or equal to 38C (100.4F), Mild Pain (1 - 3)  aluminum hydroxide/magnesium hydroxide/simethicone Suspension 30 milliLiter(s) Oral every 4 hours PRN Dyspepsia  melatonin 3 milliGRAM(s) Oral at bedtime PRN Insomnia  ondansetron Injectable 4 milliGRAM(s) IV Push every 8 hours PRN Nausea and/or Vomiting      Allergies  No Known Allergies      Vital Signs Last 24 Hrs  T(C): 37.4 (06 Feb 2023 15:46), Max: 38.1 (06 Feb 2023 10:35)  T(F): 99.3 (06 Feb 2023 15:46), Max: 100.5 (06 Feb 2023 10:35)  HR: 106 (06 Feb 2023 15:46) (68 - 108)  BP: 107/70 (06 Feb 2023 15:46) (107/70 - 144/97)  BP(mean): --  RR: 18 (06 Feb 2023 15:46) (17 - 19)  SpO2: 98% (06 Feb 2023 15:46) (97% - 100%)    Parameters below as of 06 Feb 2023 15:46  Patient On (Oxygen Delivery Method): room air        PHYSICAL EXAM:  GENERAL: NAD  HEAD:  Atraumatic  EYES: PERRLA  ENMT: Mouth moist   NECK: Supple  NERVOUS SYSTEM:  Awake, alert  CHEST/LUNG: Clear  HEART: RRR, S1, S2  ABDOMEN: Soft, non tender  EXTREMITIES:  No edema BL LE  SKIN: no rash, + extensive tattoos          LABS:                        10.2   7.62  )-----------( 146      ( 05 Feb 2023 06:06 )             31.3     02-06    140  |  102  |  7   ----------------------------<  129<H>  3.6   |  29  |  0.87    Ca    8.6      06 Feb 2023 19:30  Phos  2.7     02-06  Mg     1.6     02-06    TPro  6.8  /  Alb  2.9<L>  /  TBili  0.3  /  DBili  x   /  AST  63<H>  /  ALT  33  /  AlkPhos  77  02-06        CAPILLARY BLOOD GLUCOSE          Culture - Urine (collected 02 Feb 2023 03:50)  Source: Clean Catch Clean Catch (Midstream)  Final Report (03 Feb 2023 10:50):    <10,000 CFU/mL Normal Urogenital Radha      RADIOLOGY & ADDITIONAL TESTS:    Imaging Personally Reviewed:  [ ] YES  [ ] NO    Consultant(s) Notes Reviewed:  [ ] YES  [ ] NO    Care Discussed with Consultants/Other Providers [ ] YES  [ ] NO
CHIEF COMPLAINT: follow up of acute alcohol withdrawal  not in withdrawal at this time  no report of any fever or n.v.d.abd pain  no dysuria   + unsteady gait reported   left shoulder and bilateral knee pain       PHYSICAL EXAM:    GENERAL: Malnourished, no acute distress   CHEST/LUNG: Clear to ausculation bilaterally, no wheezing, no crackles   HEART: Regular rate and rhythm; No murmurs, rubs  ABDOMEN: Soft, Nontender, Nondistended; Bowel sounds present  EXTREMITIES:  Moving all four extremities spontaneously, No clubbing, cyanosis, or edema. Superficial abrasions bilateral knee and dressing   NERVOUS SYSTEM:  Grossly non focal.  Psychiatry: AAO x 3, mood is appropriate       OBJECTIVE DATA:     Vital Signs Last 24 Hrs  T(C): 36.2 (09 Feb 2023 10:35), Max: 37.1 (08 Feb 2023 15:29)  T(F): 97.2 (09 Feb 2023 10:35), Max: 98.8 (08 Feb 2023 23:55)  HR: 84 (09 Feb 2023 10:35) (76 - 89)  BP: 106/71 (09 Feb 2023 10:35) (98/66 - 114/79)  BP(mean): --  RR: 19 (09 Feb 2023 10:35) (17 - 19)  SpO2: 100% (09 Feb 2023 10:35) (96% - 100%)    Parameters below as of 09 Feb 2023 10:35  Patient On (Oxygen Delivery Method): room air               Daily     Daily   LABS:                        9.8    4.06  )-----------( 313      ( 08 Feb 2023 07:11 )             30.2             02-08    144  |  108  |  9   ----------------------------<  104<H>  3.9   |  28  |  0.63    Ca    9.4      08 Feb 2023 07:11  Phos  4.2     02-08  Mg     2.0     02-08    TPro  7.3  /  Alb  3.2<L>  /  TBili  0.3  /  DBili  x   /  AST  53<H>  /  ALT  34  /  AlkPhos  73  02-08                  MEDICATIONS  (STANDING):  bacitracin   Ointment 1 Application(s) Topical daily  folic acid 1 milliGRAM(s) Oral daily  multivitamin 1 Tablet(s) Oral daily  thiamine 100 milliGRAM(s) Oral daily    MEDICATIONS  (PRN):  acetaminophen     Tablet .. 650 milliGRAM(s) Oral every 6 hours PRN Temp greater or equal to 38C (100.4F), Mild Pain (1 - 3)  aluminum hydroxide/magnesium hydroxide/simethicone Suspension 30 milliLiter(s) Oral every 4 hours PRN Dyspepsia  melatonin 3 milliGRAM(s) Oral at bedtime PRN Insomnia  ondansetron Injectable 4 milliGRAM(s) IV Push every 8 hours PRN Nausea and/or Vomiting      
Patient is a 42y old  Male who presents with a chief complaint of EtOH Withdrawal (04 Feb 2023 17:23)      INTERVAL HPI/OVERNIGHT EVENTS:  Pt was seen and examined, no acute events.      MEDICATIONS  (STANDING):  dextrose 5% + lactated ringers. 1000 milliLiter(s) (75 mL/Hr) IV Continuous <Continuous>  folic acid 1 milliGRAM(s) Oral daily  thiamine 100 milliGRAM(s) Oral daily    MEDICATIONS  (PRN):  acetaminophen     Tablet .. 650 milliGRAM(s) Oral every 6 hours PRN Temp greater or equal to 38C (100.4F), Mild Pain (1 - 3)  aluminum hydroxide/magnesium hydroxide/simethicone Suspension 30 milliLiter(s) Oral every 4 hours PRN Dyspepsia  melatonin 3 milliGRAM(s) Oral at bedtime PRN Insomnia  ondansetron Injectable 4 milliGRAM(s) IV Push every 8 hours PRN Nausea and/or Vomiting      Allergies  No Known Allergies      Vital Signs Last 24 Hrs  T(C): 37.4 (07 Feb 2023 10:25), Max: 37.4 (06 Feb 2023 15:46)  T(F): 99.4 (07 Feb 2023 10:25), Max: 99.4 (07 Feb 2023 10:25)  HR: 105 (07 Feb 2023 10:25) (87 - 106)  BP: 110/75 (07 Feb 2023 10:25) (107/70 - 138/89)  BP(mean): --  RR: 18 (07 Feb 2023 10:25) (17 - 18)  SpO2: 99% (07 Feb 2023 10:25) (96% - 99%)    Parameters below as of 07 Feb 2023 10:25  Patient On (Oxygen Delivery Method): room air        PHYSICAL EXAM:  GENERAL: NAD  HEAD:  B/L periorbital hematoma, L >R  EYES: PERRLA  ENMT: Mouth moist   NECK: Supple  NERVOUS SYSTEM:  Awake, alert  CHEST/LUNG: Clear  HEART: RRR, S1, S2  ABDOMEN: Soft, non tender  EXTREMITIES:  No edema BL LE  SKIN: no rash, + extensive tattoos    LABS:                        10.0   4.91  )-----------( 241      ( 07 Feb 2023 05:55 )             30.6     02-07    140  |  105  |  6<L>  ----------------------------<  93  3.5   |  28  |  0.73    Ca    9.1      07 Feb 2023 05:55  Phos  3.3     02-07  Mg     1.8     02-07    TPro  7.0  /  Alb  3.1<L>  /  TBili  0.3  /  DBili  x   /  AST  56<H>  /  ALT  31  /  AlkPhos  77  02-07        CAPILLARY BLOOD GLUCOSE          RADIOLOGY & ADDITIONAL TESTS:    Imaging Personally Reviewed:  [ ] YES  [ ] NO    Consultant(s) Notes Reviewed:  [ ] YES  [ ] NO    Care Discussed with Consultants/Other Providers [ ] YES  [ ] NO
Patient is a 42y old  Male who presents with a chief complaint of EtOH Withdrawal (04 Feb 2023 17:23)      INTERVAL HPI/OVERNIGHT EVENTS:  Pt was seen and examined, no acute events.      MEDICATIONS  (STANDING):  dextrose 5% + sodium chloride 0.45%. 1000 milliLiter(s) (75 mL/Hr) IV Continuous <Continuous>  folic acid 1 milliGRAM(s) Oral daily  PHENobarbital Injectable 130 milliGRAM(s) IV Push every 6 hours  thiamine 100 milliGRAM(s) Oral daily    MEDICATIONS  (PRN):  acetaminophen     Tablet .. 650 milliGRAM(s) Oral every 6 hours PRN Temp greater or equal to 38C (100.4F), Mild Pain (1 - 3)  aluminum hydroxide/magnesium hydroxide/simethicone Suspension 30 milliLiter(s) Oral every 4 hours PRN Dyspepsia  melatonin 3 milliGRAM(s) Oral at bedtime PRN Insomnia  ondansetron Injectable 4 milliGRAM(s) IV Push every 8 hours PRN Nausea and/or Vomiting      Allergies  No Known Allergies        Vital Signs Last 24 Hrs  T(C): 36.4 (05 Feb 2023 16:30), Max: 37.3 (05 Feb 2023 10:24)  T(F): 97.5 (05 Feb 2023 16:30), Max: 99.2 (05 Feb 2023 10:24)  HR: 105 (05 Feb 2023 16:30) (71 - 107)  BP: 124/89 (05 Feb 2023 16:30) (109/71 - 132/78)  BP(mean): --  RR: 16 (05 Feb 2023 16:30) (16 - 19)  SpO2: 100% (05 Feb 2023 16:30) (96% - 100%)    Parameters below as of 05 Feb 2023 10:24  Patient On (Oxygen Delivery Method): room air        PHYSICAL EXAM:  GENERAL: NAD  HEAD:  Atraumatic  EYES: PERRLA  ENMT: Mouth moist   NECK: Supple  NERVOUS SYSTEM:  Awake, alert  CHEST/LUNG: Clear  HEART: RRR, S1, S2  ABDOMEN: Soft, non tender  EXTREMITIES:  No edema BL LE  SKIN: no rash, + extensive tattoos        LABS:                        10.2   7.62  )-----------( 146      ( 05 Feb 2023 06:06 )             31.3     02-05    140  |  104  |  7   ----------------------------<  57<L>  3.0<L>   |  21<L>  |  0.68    Ca    8.6      05 Feb 2023 06:06  Phos  4.8     02-04  Mg     1.6     02-04    TPro  6.8  /  Alb  3.1<L>  /  TBili  0.7  /  DBili  x   /  AST  78<H>  /  ALT  35  /  AlkPhos  68  02-05        CAPILLARY BLOOD GLUCOSE          Culture - Urine (collected 02 Feb 2023 03:50)  Source: Clean Catch Clean Catch (Midstream)  Final Report (03 Feb 2023 10:50):    <10,000 CFU/mL Normal Urogenital Radha      RADIOLOGY & ADDITIONAL TESTS:    Imaging Personally Reviewed:  [ ] YES  [ ] NO    Consultant(s) Notes Reviewed:  [ ] YES  [ ] NO    Care Discussed with Consultants/Other Providers [ ] YES  [ ] NO

## 2023-02-09 NOTE — DISCHARGE NOTE NURSING/CASE MANAGEMENT/SOCIAL WORK - PATIENT PORTAL LINK FT
You can access the FollowMyHealth Patient Portal offered by [facility] by registering at the following website: [email or web address]. By joining [facility]’s FollowMyHealth portal, you will also be able to view your health information using other applications (apps) compatible with our system.

## 2023-02-09 NOTE — DISCHARGE NOTE NURSING/CASE MANAGEMENT/SOCIAL WORK - NSDCPEFALRISK_GEN_ALL_CORE
For information on Fall & Injury Prevention, visit: [email or web address] OR  [email or web address] OR  https://www.cdc.gov/steadi/patient.html

## 2023-02-09 NOTE — DISCHARGE NOTE PROVIDER - HOSPITAL COURSE
HPI: 41 yo m with h/o alcohol use , discharged one day prior this a=dafter treatment for syncope and electrolyte disorder presents today in withdrawal has not had a drink in 4 days. per notes from past admission, ciwa protocol was initiated but score was too low to start ativan. pt is confused and cant provide reliable hx. initial ciwa 9 now improved to 5 after ativan and phenobarb. ct head and face unremarkable for fracture or bleed. labs unchanged from discharge however lipase was checked and is 690. us Abd feb 2 did not show pancreatitis. initial vitals 165/91, 147 bpm.     Course:     Acute on chronic metabolic encephalopathy with Acute etoh withdrawal   Etoh abuse   Chronic or mild acute pancreatitis.  Hypokalemia  Macrocytic anemia.   bilateral knee superficial abrasions.   left shoulder pain. able to lift and rotate left shoulder.     - S/P phenobarb  - Off CIWA protocol  - Fall precautions  Reviewed vitamin B12 and folate level  cont tylenol for pain control  bacitracin over knee abrasions.    TIN for unsteady gait.   Seen and examined by me today. Vitals stable.     All questions welcomed and answered appropriately. Patient verbalized understanding of post discharge physician's follows up and discharge instructions.   DC time spent by me face to face excluding billable procedures 38 mins

## 2023-02-09 NOTE — DISCHARGE NOTE PROVIDER - NSDCFUADDINST_GEN_ALL_CORE_FT
1) It is important to see your primary physician within post rehab discharge 7-10 days to perform a comprehensive medical review.  Call their offices for an appointment as soon as you leave the hospital.  If you do not have a primary physician or unable to reach your PCP, contact the [facility] Physician Referral Service at [phone].  Your medical issues appear to be stable at this time, but if your symptoms recur or worsen, contact your physicians and/or return to the hospital if necessary.  If you encounter any issues or questions with your medication, call your physicians before stopping the medication.    2) Please access [facility] Patient portal (as instructed on the discharge paperwork) to access your medical records at any time after discharge.

## 2023-02-09 NOTE — DISCHARGE NOTE PROVIDER - CARE PROVIDER_API CALL
follow up discharge instructions at the rehab,   Phone: (   )    -  Fax: (   )    -  Follow Up Time:

## 2023-02-09 NOTE — DISCHARGE NOTE PROVIDER - NSDCMRMEDTOKEN_GEN_ALL_CORE_FT
bacitracin 500 units/g topical ointment: 1 application topically once a day  folic acid 1 mg oral tablet: 1 tab(s) orally once a day  melatonin 3 mg oral tablet: 1 tab(s) orally once a day (at bedtime), As needed, Insomnia  Multiple Vitamins oral tablet: 1 tab(s) orally once a day  thiamine 100 mg oral tablet: 1 tab(s) orally once a day

## 2023-02-09 NOTE — PROGRESS NOTE ADULT - ASSESSMENT
43 yo m with h/o alcohol use , discharged one day prior this a=dafter treatment for syncope and electrolyte disorder presents today in withdrawal has not had a drink in 4 days. per notes from past admission, ciwa protocol was initiated but score was too low to start ativan. pt is confused and cant provide reliable hx. initial ciwa 9 now improved to 5 after ativan and phenobarb. ct head and face unremarkable for fracture or bleed. labs unchanged from discharge however lipase was checked and is 690. us Abd feb 2 did not show pancreatitis. initial vitals 165/91, 147 bpm.     Acute on chronic metabolic encephalopathy   Acute etoh withdrawal   Etoh abuse   Chronic or mild acute pancreatitis       - Ciwa protocol, ICU consulted , upgraded to tele  - On phenobarb, last dose tonight  - Avoid excessive sedation   - Regular diet , not taking much PO  - Hypoglycemic this, switch IVF to moi NS D5W  - SCDs  - PT eval  
41 yo m with h/o alcohol use , discharged one day prior this a=dafter treatment for syncope and electrolyte disorder presents today in withdrawal has not had a drink in 4 days. per notes from past admission, ciwa protocol was initiated but score was too low to start ativan. pt is confused and cant provide reliable hx. initial ciwa 9 now improved to 5 after ativan and phenobarb. ct head and face unremarkable for fracture or bleed. labs unchanged from discharge however lipase was checked and is 690. us Abd feb 2 did not show pancreatitis. initial vitals 165/91, 147 bpm.     Acute on chronic metabolic encephalopathy   Acute etoh withdrawal   Etoh abuse   Chronic or mild acute pancreatitis. Lipase 678   Hypokalemia  Macrocytic anemia. no active bleeding.     - S/P phenobarb  - DC CIWA protocol  - Fall precautions  check vitamin B12 and folate level  DC IVF    SCDs. patient ambulatory.   Full code      
43 yo m with h/o alcohol use , discharged one day prior this a=dafter treatment for syncope and electrolyte disorder presents today in withdrawal has not had a drink in 4 days. per notes from past admission, ciwa protocol was initiated but score was too low to start ativan. pt is confused and cant provide reliable hx. initial ciwa 9 now improved to 5 after ativan and phenobarb. ct head and face unremarkable for fracture or bleed. labs unchanged from discharge however lipase was checked and is 690. us Abd feb 2 did not show pancreatitis. initial vitals 165/91, 147 bpm.     Acute on chronic metabolic encephalopathy   Acute etoh withdrawal   Etoh abuse   Chronic or mild acute pancreatitis.  Hypokalemia  Macrocytic anemia.   bilateral knee superficial abrasions.   left shoulder pain. able to lift and rotate left shoulder.     - S/P phenobarb  - Off CIWA protocol  - Fall precautions  Reviewed vitamin B12 and folate level  cont tylenol for pain control  bacitracin over knee abrasions.     SCDs. patient ambulatory.   Full code  pending TIN placement.       
43 yo m with h/o alcohol use , discharged one day prior this a=dafter treatment for syncope and electrolyte disorder presents today in withdrawal has not had a drink in 4 days. per notes from past admission, ciwa protocol was initiated but score was too low to start ativan. pt is confused and cant provide reliable hx. initial ciwa 9 now improved to 5 after ativan and phenobarb. ct head and face unremarkable for fracture or bleed. labs unchanged from discharge however lipase was checked and is 690. us Abd feb 2 did not show pancreatitis. initial vitals 165/91, 147 bpm.     Acute on chronic metabolic encephalopathy   Acute etoh withdrawal   Etoh abuse   Chronic or mild acute pancreatitis       - Ciwa protocol, ICU consulted , upgraded to tele  - S/P phenobarb  - Avoid excessive sedation   - Regular diet , not taking much PO  - Hypoglycemic this, switched IVF, can change to LR D5W  - SCDs  - PT eval  - OOB to chair  
43 yo m with h/o alcohol use , discharged one day prior this a=dafter treatment for syncope and electrolyte disorder presents today in withdrawal has not had a drink in 4 days. per notes from past admission, ciwa protocol was initiated but score was too low to start ativan. pt is confused and cant provide reliable hx. initial ciwa 9 now improved to 5 after ativan and phenobarb. ct head and face unremarkable for fracture or bleed. labs unchanged from discharge however lipase was checked and is 690. us Abd feb 2 did not show pancreatitis. initial vitals 165/91, 147 bpm.     Acute on chronic metabolic encephalopathy   Acute etoh withdrawal   Etoh abuse   Chronic or mild acute pancreatitis       - Ciwa protocol, ICU consulted , upgraded to tele  - S/P phenobarb  - Avoid excessive sedation   - Regular diet  - Hypoglycemic episode, On  LR D5W, Stop if taking enough PO    Replace K, continue Folate, thiamine, multivitamin  - SCDs  - PT eval: TIN, DObt will need once improved, Pt was ambulating independently in prior admission few days back.  - OOB to chair, encourage ambulation when stable

## 2023-02-09 NOTE — DISCHARGE NOTE PROVIDER - NSDCCPCAREPLAN_GEN_ALL_CORE_FT
PRINCIPAL DISCHARGE DIAGNOSIS  Diagnosis: Alcohol withdrawal delirium  Assessment and Plan of Treatment:

## 2023-02-14 DIAGNOSIS — G93.41 METABOLIC ENCEPHALOPATHY: ICD-10-CM

## 2023-02-14 DIAGNOSIS — F10.231 ALCOHOL DEPENDENCE WITH WITHDRAWAL DELIRIUM: ICD-10-CM

## 2023-02-14 DIAGNOSIS — E87.6 HYPOKALEMIA: ICD-10-CM

## 2023-02-14 DIAGNOSIS — D64.9 ANEMIA, UNSPECIFIED: ICD-10-CM

## 2023-02-14 DIAGNOSIS — R45.1 RESTLESSNESS AND AGITATION: ICD-10-CM

## 2023-02-14 DIAGNOSIS — F17.210 NICOTINE DEPENDENCE, CIGARETTES, UNCOMPLICATED: ICD-10-CM

## 2023-02-14 DIAGNOSIS — K85.20 ALCOHOL INDUCED ACUTE PANCREATITIS WITHOUT NECROSIS OR INFECTION: ICD-10-CM

## 2024-01-14 ENCOUNTER — INPATIENT (INPATIENT)
Facility: HOSPITAL | Age: 44
LOS: 30 days | Discharge: HOME | End: 2024-02-14
Attending: HOSPITALIST | Admitting: HOSPITALIST
Payer: MEDICAID

## 2024-01-14 VITALS
HEART RATE: 98 BPM | RESPIRATION RATE: 18 BRPM | SYSTOLIC BLOOD PRESSURE: 147 MMHG | DIASTOLIC BLOOD PRESSURE: 94 MMHG | OXYGEN SATURATION: 100 % | TEMPERATURE: 98 F

## 2024-01-14 PROCEDURE — 99285 EMERGENCY DEPT VISIT HI MDM: CPT

## 2024-01-14 NOTE — ED PROVIDER NOTE - IV ALTEPLASE INCLUSION HIDDEN
"Kirti Beltrán is a 25 y.o. female who is here for a health maintenance visit.    Kirti presents today with several acute concerns today.    She notes she had a significant intended weight loss of 65 pounds in between 2984-0015  With now running marathons.   Since the weight loss she had had significant night sweats, feeling like she sweats \"gallons\" at night.  Denies recent or past illness, cough, fevers, chills, nausea, vomiting, abdominal pains.    She has complaints of syncopal episodes.  Notes syncopal episodes occur in the summer after intense exercising of running then showering.  Notes she has had episodes where she loses consciousness.  She does not have a pre or post workout snack and does run long distance.   Denies shortness of breath, chest pains, racing or skipped heart beats, or palpitations.  Denies seizure like activity during the episodes.   Notes father with heart disease, MI in mid 50s    She also notes her right ankle has been painful- chronic and has seen mirna specialist in the past.   Pain has been intermittent over the past year rates her pain a 2/10  She is a runner and participates in marathons  Denies past or present injury to the ankle  Rest and decreased use of the ankle make it worse, seems to \"stiffen\"  Exercise makes the pain better.  Uses Naproxen 500 mg PO before long runs for pain management  Denies the need for regular use of medications for pain relief.  Denies use of bracing, ice, or heat     Patient has history of tubes in her ears as a child, after removal hole in her left tympanic membrane did not heal  Attempted tympanoplasty in 1997, not effective still has hole in left tympanic membrane  Concerns about being able to swim  Denies pain, dizziness, or vertigo, otaligia.    Patient's last current PAP smear was 3/2016, no history of abnormal PAPs  Immunizations are current.  Patient is fasting for labs today.      Reviewed past medical/surgical history, family history, social " "history, medications and updated chart below.   Allergies   Allergen Reactions     Sulfa (Sulfonamide Antibiotics) Hives     Wellbutrin [Bupropion Hcl] Unknown     Current Outpatient Prescriptions   Medication Sig Dispense Refill     CALCIUM CARBONATE (CALCIUM 600 ORAL) Take by mouth.       escitalopram oxalate (LEXAPRO) 10 MG tablet        MONONESSA, 28, 0.25-35 mg-mcg per tablet TK 1 T PO D  1     No current facility-administered medications for this visit.      No past medical history on file.  No past surgical history on file.  Social History     Social History     Marital status:      Spouse name: N/A     Number of children: N/A     Years of education: N/A     Social History Main Topics     Smoking status: Never Smoker     Smokeless tobacco: None     Alcohol use None     Drug use: None     Sexual activity: Not Asked     Other Topics Concern     None     Social History Narrative     None     No family history on file.      Review of Systems   Constitutional: Excessive night sweats  HENT: Hole in left tympanic membrane   Eyes: Negative.   Respiratory: Negative.   Cardiovascular: Negative.   Gastrointestinal: Negative.   Endocrine: Negative.   Genitourinary: Negative.   Musculoskeletal: Right ankle pain intermittent   Skin: Negative.   Allergic/Immunologic: Negative.   Neurological: Intermittenet syncoplal episodes after running in the summer during showering.   Hematological: Negative.   Psychiatric/Behavioral: Negative.       Objective:     Physical Exam   Visit Vitals     BP 94/66 (Patient Site: Left Arm, Patient Position: Sitting, Cuff Size: Adult Regular)     Pulse 78     Temp 98  F (36.7  C) (Oral)     Ht 5' 2.25\" (1.581 m)     Wt 132 lb (59.9 kg)     LMP 02/21/2017     BMI 23.95 kg/m2       Constitutional: Alert and oriented x3, well nourished without any acute distress  HENT:   Right Ear: External ear normal.   Left Ear: External ear normal, two small holes in left tympanic membrane visualized, " pearly gray, slightly concave, landmarks easily visualized  Nose: Nose normal.   Mouth/Throat: Oropharynx is clear and moist.   Eyes: Conjunctivae and EOM are normal. Pupils are equal, round, and reactive to light. Right eye exhibits no discharge. Left eye exhibits no discharge.   Neck: No thyromegaly present.   Lymphadenopathy: Without palpable lymphadenopathy  Cardiovascular: Normal S1 and S2. Regular rate, rhythm and no murmur, rubs or gallops. No carotid bruits. Palpable distal pulses bilaterally.  Pulmonary/Chest: Normal effort. Lungs clear to auscultation in all lobes. Without wheezing, rhonchi or crackles.    Abdominal: Soft, non tenderness. There is no rebound or guarding. Bowel sounds x4. Without organomegaly. No CVA tenderness.  Musculoskeletal: 5/5 strength  And full ROM in all extremities. No joint swelling or deformity. Right ankle non-erethematous, no swelling, full range of motion present, Pedal pulses +2, Muscle strength lower extremities +5 bilaterally.  Neurological: Cranial nerves intact, without deficit. Without numbness, tingling or paresthesia. Normal reflexes  Skin: Dry and intact; without rashes or lesions, multiple nevi noted   Psychiatric: Normal mood and affect.   Breast: No chest deformity, asymmetry. Normal contours. Without nodules, masses, tenderness, or axillary adenopathy. No nipple discharge or dimpling noted.     1. Health care maintenance  Last normal PAP 3/2016 per care everywhere, next due 3/2019.  I will order fasting labs today, will follow-up with patient once results received.  Recommended self skin checks, declined referral to dermatology for skin check  We discussed healthy lifestyle, nutrition, cardiovascular risk reduction, self care, safety, self breast exams, sunscreen, and seatbelt screening  Recommended annual physical exam or sooner for any acute concerns.   Patient agreed and appeared pleased with plan    - Lipid Ava FASTING  - Comprehensive Metabolic Panel  -  Vitamin D, Total (25-Hydroxy)  - HM2(CBC w/o Differential)    2. Night sweats  Will order a thyroid level today and follow-up with patient once results received.  Discussed with patient to follow up if worsening symptoms, questions or concerns  Patient agreed and appeared pleased with plan    - Thyroid Cascade    3. Ear anomaly  Will order a referral to ENT today so patient's left tympanic membrane can be evaluated.  Discussed with patient to follow up if worsening symptoms, questions or concerns  Patient agreed and appeared pleased with plan    - Ambulatory referral to ENT    4. Hx of syncope  I suspect hypoglycemic events v. Dehydration v. Electrolyte imbalances after intense exercise v. ?   I will order an EKG today-results sinus bradycardia  Will send EKG to cardiologist for confirmation of reading and follow-up with patient once results received  Patient declined echocardiogram today or further cardiac workup.   Will check labs today and will follow-up with results once received.  Recommended patient have a pre and post workout snack  Discussed red flags that would warrant urgent evaluation. Patient verbalized understanding.  Follow up if worsening symptoms, questions or concerns  Patient agreed and appeared pleased with plan    - Electrocardiogram Perform and Read    5. Right ankle pain, unspecified chronicity  I will order a referral to orthopedics today for follow-up of chronic right ankle pain  Recommended continued use of Naproxen for pain relief.  Recommended use of ice for pain relief  Follow up if worsening symptoms, questions or concerns  Patient agreed and appeared pleased with plan    - Ambulatory referral to Orthopedics      25 minutes was spent above physical exam discussing acute concerns - Hx of syncope, EKG, right ankle pain, ear concern, and night sweats    .    Addendum:  I have seen and examined Kirti Beltrán with student Belén Henley. I agree with the above note. I was present during the  entirety of the office visit. I did complete the physical exam and charting.     Radha De Leon   Family Nurse Practitioner  [facility]   show

## 2024-01-14 NOTE — ED PROVIDER NOTE - INTERNATIONAL TRAVEL
SW/CM Infant Initial Plan of Care Note     Baseline Assessment  Received referral for  maternal opiod use/ safety issues.  Reviewed medical record. Patient was admitted to the hospital on 2017 at Gestational Age: 39w6d with a diagnosis of healthy birth/JUDY watch.      Met with mother alone in room initially, grandma came later.  Explained SW/CM role, reason for involvement, support available from hospital staff and SW/CM.  Answered questions about the hospitalization.      Insurance  Primary: N/A  Secondary: N/A    Barriers to Discharge  Identified Barriers to Discharge/Transition Planning:      Progress Note  Sw received MD order for SW consult due to safety issues, maternal opiod use during pregnancy. Urine drug screen was unable to be obtained, meconium is pending. Last JUDY score was 7.  SW received update form nursing staff from overnight regarding issues with mother being no info out  But yet Father of baby and his family were visiting patient. Mother ended up moving rooms for this reason. RN requested SW follow up with patient today. Sw reviewed medical record. Sw met with mother alone initially in her room. Mother just began talking about her social situation prior to SW even asking questions. Mother's infant daughter is named Janet and this is her first child. Mother stated she has chronic back pain and is trying to get disability as she has been unable to work due to her back pain issues. Mother says that [facility] [address] in [place].  Patient stated she is prescribed oxycodone and takes 1-3 tablets a day but tries not to take it. Mother stated she was living with the KEN Drew in [place] and they were originally just roomates but then developed a relationship together and she became pregnant. Mother said KEN Herrera has told her that he does not want anything to do with baby during her pregnancy.  Mother then moved back in with her parents at [address] in [place]. Mother stated he then showed up last night at hospital and wants to be involved. Mother discussed that his family has showed up at her parents house, is calling her family. Mother said they both financially split responsibilities of the apartment but he has not been paying his share.  Mother currently has no income, and will be applying for W2 and WIC benefits. Mother does not feel that her safety or her baby's in harm by FOB, but worried about him trying to get full custody of her daughter. Sw explained paternity and that he will need to call and get paternity testing. SW also deferred to birth registry for other paternity questions. Mother open to any help for resources like housing of her own or financial help. Sw provided mother with information on WIC locations and W2.  Sw asked mother about if she would be interested in Safe Mom/Safe Baby program through [place] to help her and her daughter stay safe and access resources available to them. Mother agreeable to referral.   SW discussed mandate to report any positive drug screen or infant experiencing withdrawal to Child Protective Services. Mother cooperative and agreeable for them to confirm prescriptions. Mother also aware that patient is being scored for withdrawal and we want to make sure she is comfortable and getting help if she needs it.  Mother reports that she has crib, carseat, clothes and everything she needs for her daughter at her parent's home. Mother reports that her parents help her out financially. Mother does get food share. Grandma will be off work for 2 weeks to help with the baby.     Patient's maternal grandma entered room at end of assessment. Mother asked her to step out while SW and mother discussed the opioid use. Then mother agreeable for Grandma to come in and for SW to update her on the paternity questions. SW answered Grandma's questions and stated plan for birth registry to answer more specifically. Mother and  Grandma both agreeable to Safe Mom/Safe Baby. Grandma aware of mother's pain issues and disability process but unsure if Grandma knows about prescription use during pregnancy.  JOSHUA updated RN.  JOSHUA updated  Bonita from Safe Mom/Safe Baby program who will met with mother and offer support.     JOSHUA contacting [place] CPS and made report of drug affected infant, past 2 JUDY scores are a 7.      Plan  Recommendations to Discharge Planning:    Anticipate infant will need post-hospital services. Necessary services are available.    Reinforced SW/CM role to provide support to families and help assist with discharge planning for infant when ready for hospital discharge.  Encouraged parents/family to call SW/CM with further questions or concerns. SW/CM will continue to follow and support family during infant's stay.    Refer to SW/CM flowsheet for goals and objective data.              Unable to Assess

## 2024-01-14 NOTE — ED PROVIDER NOTE - NS ED ROS FT
GENERAL: + intoxication (still arousable and answers simple questions for ROS)no fever  EYES: no eye pain  HEENT: no neck pain  CARDIAC: no chest pain  PULMONARY: no SOB  GI: no abdominal pain  : no dysuria  SKIN: no rashes  NEURO: no headache  MSK: no new joint pain

## 2024-01-14 NOTE — ED PROVIDER NOTE - OBJECTIVE STATEMENT
43-year-old male, history of chronic alcohol use, complicated by homelessness, presenting for clinical intoxication.  Brought in by EMS.  Was spending time with his friends, drink multiple drinks, unspecified number and presented here.   Limited history secondary to intoxication.  Does not take regular medications.  No allergies to medications.  Resides at shelter.

## 2024-01-14 NOTE — ED PROVIDER NOTE - PROGRESS NOTE DETAILS
Uri, Attending  Delayed note. Patient signed out to me at midnight. 43-year-old male with pmhx of chronic alcohol use, undomiciled presenting for clinical intoxication, reports drinking multiple drinks with no falls/trauma, no evidence of external trauma. Reassessed and still mildly intoxicated and gait not stable at this time. Will draw basic labs for low suspicion for electrolyte derangement and will require reassessment Jose F IBANEZ: Upon reassessment, pt is clinically sober, but is stating that he started having localized midsternal chest pain while in the ED. Will add troponin, CXR, and pursue EKG. Jose F IBANEZ: Pt feels mildly anxious, has mild tremors/tongue fasciculations. Daily drinker, will give ativan for mild CIWA score. Jose F IBANEZ: Admitted for CIWA and cardiac workup.

## 2024-01-14 NOTE — ED PROVIDER NOTE - PHYSICAL EXAMINATION
Gen: NAD,   Head: normal appearing  HEENT: normal conjunctiva  Lung: no respiratory distress, speaking in full sentences, ctab.     CV: regular rate and rhythm, no murmurs  Abd: soft, non distended, non tender   MSK: no visible deformities, no vertebral body tenderness over the midline throughout.  Neuro: alert,  Slurred speech, generally oriented, clinically intoxicated.  Skin: No monster rashes

## 2024-01-14 NOTE — ED PROVIDER NOTE - ATTENDING CONTRIBUTION TO CARE
Dr. Markham:  I have personally performed a face to face bedside history and physical examination of this patient. I have discussed the history, examination, review of systems, assessment and plan of management with the resident. I have reviewed the electronic medical record and amended it to reflect my history, review of systems, physical exam, assessment and plan.    43M reportedly brought in by a friend after drinking heavily.  Pt arousable to voice but otherwise sleeping and not providing any history.  Told the RN that he was drinking, told myself that he was angry he's in the hospital.    Exam:  - decreased personal hygiene; sleeping  - slurring his words when speaking  - rrr  - ctab    A/P  - likely ETOH intox  - reassess for sobriety

## 2024-01-14 NOTE — ED PROVIDER NOTE - CLINICAL SUMMARY MEDICAL DECISION MAKING FREE TEXT BOX
Adult male, presenting for clinical intoxication in the context of reported alcohol use.  Primary survey clear.  Vital signs unremarkable.  There are no external signs of trauma, no major indications for emergent imaging of the vertebral column, CNS, chest abdomen or pelvis.  His intoxicated state is likely secondary to alcohol use, given no alternative risk factors to suggest alternative drug use or metabolic derangements, the overall condition probability is felt to be low.  Will clinically trend, metabolization to freedom.  If his clinical status changes aside from amelioration of intoxicated state, will prompt further workup.  Pending reevaluation.

## 2024-01-14 NOTE — ED ADULT NURSE NOTE - OBJECTIVE STATEMENT
Patient received 5A, sleeping in stretcher. pt brought in by friend from the street for intox. Pt arousable on assessment, calm and cooperative. pt states "I drank too much. I just wanted to have a good time." Pt denies SI/HI, Auditory-Visual hallucinations, chest pain, sob, n+v. Breathing even, unlabored; no signs of distress. Vitals stable. Safety maintained. Pending MD evaluation.

## 2024-01-14 NOTE — ED ADULT NURSE NOTE - NSFALLUNIVINTERV_ED_ALL_ED
Bed/Stretcher in lowest position, wheels locked, appropriate side rails in place/Call bell, personal items and telephone in reach/Instruct patient to call for assistance before getting out of bed/chair/stretcher/Non-slip footwear applied when patient is off stretcher/[place] to call system/Physically safe environment - no spills, clutter or unnecessary equipment/Purposeful proactive rounding/Room/bathroom lighting operational, light cord in reach Bed/Stretcher in lowest position, wheels locked, appropriate side rails in place/Call bell, personal items and telephone in reach/Instruct patient to call for assistance before getting out of bed/chair/stretcher/Non-slip footwear applied when patient is off stretcher/[place] to call system/Physically safe environment - no spills, clutter or unnecessary equipment/Purposeful proactive rounding/Room/bathroom lighting operational, light cord in reach Bed/Stretcher in lowest position, wheels locked, appropriate side rails in place/Call bell, personal items and telephone in reach/Instruct patient to call for assistance before getting out of bed/chair/stretcher/Non-slip footwear applied when patient is off stretcher/[place] to call system/Physically safe environment - no spills, clutter or unnecessary equipment/Purposeful proactive rounding/Room/bathroom lighting operational, light cord in reach

## 2024-01-14 NOTE — ED ADULT TRIAGE NOTE - CHIEF COMPLAINT QUOTE
pt brought in by friend from the street for intox. pt to be undressed by ED tech. pt not answering questions in triage. pt arousable to verbal stimuli in triage.

## 2024-01-15 DIAGNOSIS — R07.89 OTHER CHEST PAIN: ICD-10-CM

## 2024-01-15 DIAGNOSIS — F10.939 ALCOHOL USE, UNSPECIFIED WITH WITHDRAWAL, UNSPECIFIED: ICD-10-CM

## 2024-01-15 DIAGNOSIS — R74.01 ELEVATION OF LEVELS OF LIVER TRANSAMINASE LEVELS: ICD-10-CM

## 2024-01-15 DIAGNOSIS — F10.239 ALCOHOL DEPENDENCE WITH WITHDRAWAL, UNSPECIFIED: ICD-10-CM

## 2024-01-15 DIAGNOSIS — R00.0 TACHYCARDIA, UNSPECIFIED: ICD-10-CM

## 2024-01-15 DIAGNOSIS — Z29.9 ENCOUNTER FOR PROPHYLACTIC MEASURES, UNSPECIFIED: ICD-10-CM

## 2024-01-15 LAB
ALBUMIN SERPL ELPH-MCNC: 4 G/DL — SIGNIFICANT CHANGE UP (ref 3.3–5)
ALBUMIN SERPL ELPH-MCNC: 4 G/DL — SIGNIFICANT CHANGE UP (ref 3.3–5)
ALP SERPL-CCNC: 98 U/L — SIGNIFICANT CHANGE UP (ref 40–120)
ALP SERPL-CCNC: 98 U/L — SIGNIFICANT CHANGE UP (ref 40–120)
ALT FLD-CCNC: 43 U/L — HIGH (ref 4–41)
ALT FLD-CCNC: 43 U/L — HIGH (ref 4–41)
ANION GAP SERPL CALC-SCNC: 17 MMOL/L — HIGH (ref 7–14)
ANION GAP SERPL CALC-SCNC: 17 MMOL/L — HIGH (ref 7–14)
AST SERPL-CCNC: 121 U/L — HIGH (ref 4–40)
AST SERPL-CCNC: 121 U/L — HIGH (ref 4–40)
BASOPHILS # BLD AUTO: 0.05 K/UL — SIGNIFICANT CHANGE UP (ref 0–0.2)
BASOPHILS # BLD AUTO: 0.05 K/UL — SIGNIFICANT CHANGE UP (ref 0–0.2)
BASOPHILS NFR BLD AUTO: 1.3 % — SIGNIFICANT CHANGE UP (ref 0–2)
BASOPHILS NFR BLD AUTO: 1.3 % — SIGNIFICANT CHANGE UP (ref 0–2)
BILIRUB SERPL-MCNC: 0.2 MG/DL — SIGNIFICANT CHANGE UP (ref 0.2–1.2)
BILIRUB SERPL-MCNC: 0.2 MG/DL — SIGNIFICANT CHANGE UP (ref 0.2–1.2)
BUN SERPL-MCNC: 6 MG/DL — LOW (ref 7–23)
BUN SERPL-MCNC: 6 MG/DL — LOW (ref 7–23)
CALCIUM SERPL-MCNC: 8.7 MG/DL — SIGNIFICANT CHANGE UP (ref 8.4–10.5)
CALCIUM SERPL-MCNC: 8.7 MG/DL — SIGNIFICANT CHANGE UP (ref 8.4–10.5)
CHLORIDE SERPL-SCNC: 103 MMOL/L — SIGNIFICANT CHANGE UP (ref 98–107)
CHLORIDE SERPL-SCNC: 103 MMOL/L — SIGNIFICANT CHANGE UP (ref 98–107)
CO2 SERPL-SCNC: 25 MMOL/L — SIGNIFICANT CHANGE UP (ref 22–31)
CO2 SERPL-SCNC: 25 MMOL/L — SIGNIFICANT CHANGE UP (ref 22–31)
CREAT SERPL-MCNC: 0.69 MG/DL — SIGNIFICANT CHANGE UP (ref 0.5–1.3)
CREAT SERPL-MCNC: 0.69 MG/DL — SIGNIFICANT CHANGE UP (ref 0.5–1.3)
EGFR: 116 ML/MIN/1.73M2 — SIGNIFICANT CHANGE UP
EGFR: 116 ML/MIN/1.73M2 — SIGNIFICANT CHANGE UP
EOSINOPHIL # BLD AUTO: 0.01 K/UL — SIGNIFICANT CHANGE UP (ref 0–0.5)
EOSINOPHIL # BLD AUTO: 0.01 K/UL — SIGNIFICANT CHANGE UP (ref 0–0.5)
EOSINOPHIL NFR BLD AUTO: 0.3 % — SIGNIFICANT CHANGE UP (ref 0–6)
EOSINOPHIL NFR BLD AUTO: 0.3 % — SIGNIFICANT CHANGE UP (ref 0–6)
GLUCOSE SERPL-MCNC: 128 MG/DL — HIGH (ref 70–99)
GLUCOSE SERPL-MCNC: 128 MG/DL — HIGH (ref 70–99)
HCT VFR BLD CALC: 39.6 % — SIGNIFICANT CHANGE UP (ref 39–50)
HCT VFR BLD CALC: 39.6 % — SIGNIFICANT CHANGE UP (ref 39–50)
HGB BLD-MCNC: 13.5 G/DL — SIGNIFICANT CHANGE UP (ref 13–17)
HGB BLD-MCNC: 13.5 G/DL — SIGNIFICANT CHANGE UP (ref 13–17)
IANC: 2 K/UL — SIGNIFICANT CHANGE UP (ref 1.8–7.4)
IANC: 2 K/UL — SIGNIFICANT CHANGE UP (ref 1.8–7.4)
IMM GRANULOCYTES NFR BLD AUTO: 0.5 % — SIGNIFICANT CHANGE UP (ref 0–0.9)
IMM GRANULOCYTES NFR BLD AUTO: 0.5 % — SIGNIFICANT CHANGE UP (ref 0–0.9)
LYMPHOCYTES # BLD AUTO: 1.42 K/UL — SIGNIFICANT CHANGE UP (ref 1–3.3)
LYMPHOCYTES # BLD AUTO: 1.42 K/UL — SIGNIFICANT CHANGE UP (ref 1–3.3)
LYMPHOCYTES # BLD AUTO: 37.2 % — SIGNIFICANT CHANGE UP (ref 13–44)
LYMPHOCYTES # BLD AUTO: 37.2 % — SIGNIFICANT CHANGE UP (ref 13–44)
MAGNESIUM SERPL-MCNC: 1.7 MG/DL — SIGNIFICANT CHANGE UP (ref 1.6–2.6)
MAGNESIUM SERPL-MCNC: 1.7 MG/DL — SIGNIFICANT CHANGE UP (ref 1.6–2.6)
MCHC RBC-ENTMCNC: 33.3 PG — SIGNIFICANT CHANGE UP (ref 27–34)
MCHC RBC-ENTMCNC: 33.3 PG — SIGNIFICANT CHANGE UP (ref 27–34)
MCHC RBC-ENTMCNC: 34.1 GM/DL — SIGNIFICANT CHANGE UP (ref 32–36)
MCHC RBC-ENTMCNC: 34.1 GM/DL — SIGNIFICANT CHANGE UP (ref 32–36)
MCV RBC AUTO: 97.5 FL — SIGNIFICANT CHANGE UP (ref 80–100)
MCV RBC AUTO: 97.5 FL — SIGNIFICANT CHANGE UP (ref 80–100)
MONOCYTES # BLD AUTO: 0.32 K/UL — SIGNIFICANT CHANGE UP (ref 0–0.9)
MONOCYTES # BLD AUTO: 0.32 K/UL — SIGNIFICANT CHANGE UP (ref 0–0.9)
MONOCYTES NFR BLD AUTO: 8.4 % — SIGNIFICANT CHANGE UP (ref 2–14)
MONOCYTES NFR BLD AUTO: 8.4 % — SIGNIFICANT CHANGE UP (ref 2–14)
NEUTROPHILS # BLD AUTO: 2 K/UL — SIGNIFICANT CHANGE UP (ref 1.8–7.4)
NEUTROPHILS # BLD AUTO: 2 K/UL — SIGNIFICANT CHANGE UP (ref 1.8–7.4)
NEUTROPHILS NFR BLD AUTO: 52.3 % — SIGNIFICANT CHANGE UP (ref 43–77)
NEUTROPHILS NFR BLD AUTO: 52.3 % — SIGNIFICANT CHANGE UP (ref 43–77)
NRBC # BLD: 0 /100 WBCS — SIGNIFICANT CHANGE UP (ref 0–0)
NRBC # BLD: 0 /100 WBCS — SIGNIFICANT CHANGE UP (ref 0–0)
NRBC # FLD: 0 K/UL — SIGNIFICANT CHANGE UP (ref 0–0)
NRBC # FLD: 0 K/UL — SIGNIFICANT CHANGE UP (ref 0–0)
PHOSPHATE SERPL-MCNC: 4 MG/DL — SIGNIFICANT CHANGE UP (ref 2.5–4.5)
PHOSPHATE SERPL-MCNC: 4 MG/DL — SIGNIFICANT CHANGE UP (ref 2.5–4.5)
PLATELET # BLD AUTO: 168 K/UL — SIGNIFICANT CHANGE UP (ref 150–400)
PLATELET # BLD AUTO: 168 K/UL — SIGNIFICANT CHANGE UP (ref 150–400)
POTASSIUM SERPL-MCNC: 4 MMOL/L — SIGNIFICANT CHANGE UP (ref 3.5–5.3)
POTASSIUM SERPL-MCNC: 4 MMOL/L — SIGNIFICANT CHANGE UP (ref 3.5–5.3)
POTASSIUM SERPL-SCNC: 4 MMOL/L — SIGNIFICANT CHANGE UP (ref 3.5–5.3)
POTASSIUM SERPL-SCNC: 4 MMOL/L — SIGNIFICANT CHANGE UP (ref 3.5–5.3)
PROT SERPL-MCNC: 8 G/DL — SIGNIFICANT CHANGE UP (ref 6–8.3)
PROT SERPL-MCNC: 8 G/DL — SIGNIFICANT CHANGE UP (ref 6–8.3)
RBC # BLD: 4.06 M/UL — LOW (ref 4.2–5.8)
RBC # BLD: 4.06 M/UL — LOW (ref 4.2–5.8)
RBC # FLD: 13.8 % — SIGNIFICANT CHANGE UP (ref 10.3–14.5)
RBC # FLD: 13.8 % — SIGNIFICANT CHANGE UP (ref 10.3–14.5)
SODIUM SERPL-SCNC: 145 MMOL/L — SIGNIFICANT CHANGE UP (ref 135–145)
SODIUM SERPL-SCNC: 145 MMOL/L — SIGNIFICANT CHANGE UP (ref 135–145)
TROPONIN T, HIGH SENSITIVITY RESULT: 7 NG/L — SIGNIFICANT CHANGE UP
TROPONIN T, HIGH SENSITIVITY RESULT: 7 NG/L — SIGNIFICANT CHANGE UP
WBC # BLD: 3.82 K/UL — SIGNIFICANT CHANGE UP (ref 3.8–10.5)
WBC # BLD: 3.82 K/UL — SIGNIFICANT CHANGE UP (ref 3.8–10.5)
WBC # FLD AUTO: 3.82 K/UL — SIGNIFICANT CHANGE UP (ref 3.8–10.5)
WBC # FLD AUTO: 3.82 K/UL — SIGNIFICANT CHANGE UP (ref 3.8–10.5)

## 2024-01-15 PROCEDURE — 99223 1ST HOSP IP/OBS HIGH 75: CPT

## 2024-01-15 PROCEDURE — 71045 X-RAY EXAM CHEST 1 VIEW: CPT | Mod: 26

## 2024-01-15 RX ORDER — SODIUM CHLORIDE 9 MG/ML
1000 INJECTION, SOLUTION INTRAVENOUS
Refills: 0 | Status: DISCONTINUED | OUTPATIENT
Start: 2024-01-15 | End: 2024-01-17

## 2024-01-15 RX ORDER — ACETAMINOPHEN 500 MG
650 TABLET ORAL EVERY 6 HOURS
Refills: 0 | Status: DISCONTINUED | OUTPATIENT
Start: 2024-01-15 | End: 2024-02-14

## 2024-01-15 RX ORDER — SODIUM CHLORIDE 9 MG/ML
1000 INJECTION INTRAMUSCULAR; INTRAVENOUS; SUBCUTANEOUS ONCE
Refills: 0 | Status: COMPLETED | OUTPATIENT
Start: 2024-01-15 | End: 2024-01-15

## 2024-01-15 RX ORDER — FOLIC ACID 0.8 MG
1 TABLET ORAL DAILY
Refills: 0 | Status: DISCONTINUED | OUTPATIENT
Start: 2024-01-15 | End: 2024-02-14

## 2024-01-15 RX ORDER — NICOTINE POLACRILEX 2 MG
1 GUM BUCCAL DAILY
Refills: 0 | Status: DISCONTINUED | OUTPATIENT
Start: 2024-01-15 | End: 2024-02-14

## 2024-01-15 RX ORDER — ONDANSETRON 8 MG/1
4 TABLET, FILM COATED ORAL EVERY 8 HOURS
Refills: 0 | Status: DISCONTINUED | OUTPATIENT
Start: 2024-01-15 | End: 2024-01-22

## 2024-01-15 RX ORDER — LANOLIN ALCOHOL/MO/W.PET/CERES
3 CREAM (GRAM) TOPICAL AT BEDTIME
Refills: 0 | Status: DISCONTINUED | OUTPATIENT
Start: 2024-01-15 | End: 2024-02-14

## 2024-01-15 RX ORDER — THIAMINE MONONITRATE (VIT B1) 100 MG
100 TABLET ORAL ONCE
Refills: 0 | Status: COMPLETED | OUTPATIENT
Start: 2024-01-15 | End: 2024-01-15

## 2024-01-15 RX ORDER — THIAMINE MONONITRATE (VIT B1) 100 MG
100 TABLET ORAL DAILY
Refills: 0 | Status: COMPLETED | OUTPATIENT
Start: 2024-01-16 | End: 2024-01-18

## 2024-01-15 RX ORDER — ASPIRIN/CALCIUM CARB/MAGNESIUM 324 MG
324 TABLET ORAL ONCE
Refills: 0 | Status: COMPLETED | OUTPATIENT
Start: 2024-01-15 | End: 2024-01-15

## 2024-01-15 RX ORDER — ASPIRIN/CALCIUM CARB/MAGNESIUM 324 MG
81 TABLET ORAL DAILY
Refills: 0 | Status: DISCONTINUED | OUTPATIENT
Start: 2024-01-16 | End: 2024-01-20

## 2024-01-15 RX ADMIN — Medication 100 MILLIGRAM(S): at 20:45

## 2024-01-15 RX ADMIN — Medication 2 MILLIGRAM(S): at 13:15

## 2024-01-15 RX ADMIN — Medication 324 MILLIGRAM(S): at 16:28

## 2024-01-15 RX ADMIN — SODIUM CHLORIDE 75 MILLILITER(S): 9 INJECTION, SOLUTION INTRAVENOUS at 16:28

## 2024-01-15 RX ADMIN — Medication 1 PATCH: at 20:45

## 2024-01-15 RX ADMIN — Medication 2 MILLIGRAM(S): at 16:28

## 2024-01-15 RX ADMIN — SODIUM CHLORIDE 1000 MILLILITER(S): 9 INJECTION INTRAMUSCULAR; INTRAVENOUS; SUBCUTANEOUS at 11:39

## 2024-01-15 RX ADMIN — Medication 50 MILLIGRAM(S): at 13:16

## 2024-01-15 RX ADMIN — Medication 2 MILLIGRAM(S): at 20:45

## 2024-01-15 NOTE — H&P ADULT - NSHPREVIEWOFSYSTEMS_GEN_ALL_CORE
Gen: No fever, normal appetite  Eyes: No eye irritation or discharge  ENT: No ear pain, congestion, sore throat  Resp: No cough or trouble breathing  Cardiovascular: +chest pain  Gastroenteric: No nausea/vomiting, diarrhea, constipation  :  No change in urine output; no dysuria  MS: No joint or muscle pain  Skin: No rashes  Neuro: No headache; no abnormal movements  Remainder negative, except as per the HPI

## 2024-01-15 NOTE — H&P ADULT - PROBLEM SELECTOR PLAN 3
Pt reports intermittent left sided chest pain though will not describe it further  - troponin negative x2   - check TTE  -  now and 81 daily  - would not perform stress test while actively withdrawing  - continue tele monitoring

## 2024-01-15 NOTE — H&P ADULT - HISTORY OF PRESENT ILLNESS
43M with history of ETOH abuse (drinks 15-20 drinks daily), prior history of alcohol withdrawal initially presented to the ED for alcohol intoxication. Pt was being observed in the ED and this AM complained of intermittent left sided chest pain which occurs at random, and improves spontaneously. During workup pt developed alcohol withdrawal, was given ativan and librium, and was admitted to medicine. Pt states that he still has intermittent left sided chest pain but when asked to elaborate he falls asleep and does not answer.    While in the ED pt was afebrile, with normal BP. HR 80-100s. s/p 1L NS.

## 2024-01-15 NOTE — H&P ADULT - TIME BILLING
lov 11/7/19  Nov 5/7/20   Review of laboratory data, radiology results, consultants' recommendations, documentation in [place], discussion with patient/house staff and interdisciplinary staff (such as , social workers, etc). Interventions were performed as documented above. Review of laboratory data, radiology results, consultants' recommendations, documentation in [place], discussion with patient/house staff and interdisciplinary staff (such as , social workers, etc). Interventions were performed as documented above. Review of laboratory data, radiology results, consultants' recommendations, documentation in [place], discussion with patient/house staff and interdisciplinary staff (such as , social workers, etc). Interventions were performed as documented above.

## 2024-01-15 NOTE — ED ADULT NURSE REASSESSMENT NOTE - NS ED NURSE REASSESS COMMENT FT1
Pt is resting comfortably, offers no complaints NAD noted. VS as noted. RR even and unlabored. labs sent. satting  spo2. pt states homeless. Care ongoing. Safety measures in place.

## 2024-01-15 NOTE — H&P ADULT - PROBLEM SELECTOR PLAN 2
Sinus tachycardia likely 2/2 acute alcohol withdrawal, however pt also complaining of intermittent chest pain  - start IVF   - check TTE  - check urine tox to evaluate for cocaine use  - continue tele monitoring

## 2024-01-15 NOTE — H&P ADULT - ASSESSMENT
43M with history of ETOH abuse (drinks 15-20 drinks daily), prior history of alcohol withdrawal who presents with alcohol withdrawal and chest pain

## 2024-01-15 NOTE — H&P ADULT - NSHPLABSRESULTS_GEN_ALL_CORE
13.5   3.82  )-----------( 168      ( 15 Adiel 2024 07:15 )             39.6     01-15    145  |  103  |  6<L>  ----------------------------<  128<H>  4.0   |  25  |  0.69    Ca    8.7      15 Adiel 2024 07:15  Phos  4.0     01-15  Mg     1.70     01-15    TPro  8.0  /  Alb  4.0  /  TBili  0.2  /  DBili  x   /  AST  121<H>  /  ALT  43<H>  /  AlkPhos  98  01-15            CAPILLARY BLOOD GLUCOSE      POCT Blood Glucose.: 79 mg/dL (15 Adiel 2024 00:19)      Urinalysis Basic - ( 15 Adiel 2024 07:15 )    Color: x / Appearance: x / SG: x / pH: x  Gluc: 128 mg/dL / Ketone: x  / Bili: x / Urobili: x   Blood: x / Protein: x / Nitrite: x   Leuk Esterase: x / RBC: x / WBC x   Sq Epi: x / Non Sq Epi: x / Bacteria: x          XRay Chest: personally reviewed  IMPRESSION:  Clear lungs. No pleural effusions or pneumothorax.    Cardiac and mediastinal silhouettes within normal limits.    Trachea midline.    Unremarkable osseous structures.    EKG: HR 94 sinus no ischemic changes QTc 440

## 2024-01-15 NOTE — ED ADULT NURSE REASSESSMENT NOTE - NS ED NURSE REASSESS COMMENT FT1
pt received A&Ox4, resting comfortably in stretcher. pt endorses last drink being yesterday a mix of vodka and beer. CIWA 3 at this time. respirations even and unlabored. remains on continuous monitor, sinus tachycardia noted. no acute distress noted. calm and cooperative at this time. medicated as per MD orders. awaiting bed assignment. safety maintained, side rails up

## 2024-01-15 NOTE — H&P ADULT - NSHPPHYSICALEXAM_GEN_ALL_CORE
T(C): 36.7 (01-15-24 @ 13:00), Max: 36.7 (01-14-24 @ 20:03)  HR: 103 (01-15-24 @ 13:00) (82 - 103)  BP: 126/85 (01-15-24 @ 13:00) (105/84 - 147/94)  RR: 16 (01-15-24 @ 13:00) (14 - 18)  SpO2: 100% (01-15-24 @ 13:00) (98% - 100%)    CONSTITUTIONAL: sleeping in bed  EYES: PERRLA and symmetric, EOMI, No conjunctival or scleral injection, non-icteric  ENMT: Oral mucosa with moist membranes. Normal dentition; no pharyngeal injection or exudates  NECK: Supple, symmetric and without tracheal deviation   RESP: No respiratory distress, no use of accessory muscles; CTA b/l, no WRR  CV: tachycardia, +S1S2, no MRG; no JVD; no peripheral edema  GI: Soft, NT, ND, no rebound, no guarding; no palpable masses; no hepatosplenomegaly; no hernia palpated  MSK:  No digital clubbing or cyanosis; examination of the (head/neck/spine/ribs/pelvis, RUE, LUE, RLE, LLE) without misalignment,            Normal ROM without pain, no spinal tenderness, normal muscle strength/tone  SKIN: No rashes or ulcers noted; no subcutaneous nodules or induration palpable  NEURO: CN II-XII intact; no focal motor deficits, sensation intact in upper and lower extremities b/l to light touch +hand and tongue tremors  PSYCH: A&Ox3, not answering all questions

## 2024-01-15 NOTE — H&P ADULT - PROBLEM SELECTOR PLAN 1
Pt with significant alcohol abuse history, drinks 15-20 drinks per day, presented intoxicated and now in alcohol withdrawal. Has history of withdrawal but none previously at our hospital  - start high risk ativan taper, would have low threshold to increase dose or use phenobarb if symptoms worsening   - trend CIWA  - seizure precautions   - fall risk precautions  - multivitamin, folate and thiamine  - IVF

## 2024-01-16 LAB
A1C WITH ESTIMATED AVERAGE GLUCOSE RESULT: 5 % — SIGNIFICANT CHANGE UP (ref 4–5.6)
ALBUMIN SERPL ELPH-MCNC: 3.5 G/DL — SIGNIFICANT CHANGE UP (ref 3.3–5)
ALP SERPL-CCNC: 100 U/L — SIGNIFICANT CHANGE UP (ref 40–120)
ALT FLD-CCNC: 37 U/L — SIGNIFICANT CHANGE UP (ref 4–41)
AMPHET UR-MCNC: NEGATIVE — SIGNIFICANT CHANGE UP
ANION GAP SERPL CALC-SCNC: 14 MMOL/L — SIGNIFICANT CHANGE UP (ref 7–14)
AST SERPL-CCNC: 89 U/L — HIGH (ref 4–40)
B PERT DNA SPEC QL NAA+PROBE: SIGNIFICANT CHANGE UP
B PERT DNA SPEC QL NAA+PROBE: SIGNIFICANT CHANGE UP
B PERT+PARAPERT DNA PNL SPEC NAA+PROBE: SIGNIFICANT CHANGE UP
B PERT+PARAPERT DNA PNL SPEC NAA+PROBE: SIGNIFICANT CHANGE UP
BARBITURATES UR SCN-MCNC: NEGATIVE — SIGNIFICANT CHANGE UP
BENZODIAZ UR-MCNC: POSITIVE
BILIRUB SERPL-MCNC: 0.6 MG/DL — SIGNIFICANT CHANGE UP (ref 0.2–1.2)
BORDETELLA PARAPERTUSSIS (RAPRVP): SIGNIFICANT CHANGE UP
BORDETELLA PARAPERTUSSIS (RAPRVP): SIGNIFICANT CHANGE UP
BUN SERPL-MCNC: 10 MG/DL — SIGNIFICANT CHANGE UP (ref 7–23)
C PNEUM DNA SPEC QL NAA+PROBE: SIGNIFICANT CHANGE UP
C PNEUM DNA SPEC QL NAA+PROBE: SIGNIFICANT CHANGE UP
CALCIUM SERPL-MCNC: 8.5 MG/DL — SIGNIFICANT CHANGE UP (ref 8.4–10.5)
CHLORIDE SERPL-SCNC: 104 MMOL/L — SIGNIFICANT CHANGE UP (ref 98–107)
CHOLEST SERPL-MCNC: 165 MG/DL — SIGNIFICANT CHANGE UP
CO2 SERPL-SCNC: 24 MMOL/L — SIGNIFICANT CHANGE UP (ref 22–31)
COCAINE METAB.OTHER UR-MCNC: NEGATIVE — SIGNIFICANT CHANGE UP
CREAT SERPL-MCNC: 0.62 MG/DL — SIGNIFICANT CHANGE UP (ref 0.5–1.3)
CREATININE URINE RESULT, DAU: 189 MG/DL — SIGNIFICANT CHANGE UP
EGFR: 120 ML/MIN/1.73M2 — SIGNIFICANT CHANGE UP
ESTIMATED AVERAGE GLUCOSE: 97 — SIGNIFICANT CHANGE UP
FLUAV SUBTYP SPEC NAA+PROBE: SIGNIFICANT CHANGE UP
FLUAV SUBTYP SPEC NAA+PROBE: SIGNIFICANT CHANGE UP
FLUBV RNA SPEC QL NAA+PROBE: SIGNIFICANT CHANGE UP
FLUBV RNA SPEC QL NAA+PROBE: SIGNIFICANT CHANGE UP
GLUCOSE BLDC GLUCOMTR-MCNC: 72 MG/DL — SIGNIFICANT CHANGE UP (ref 70–99)
GLUCOSE SERPL-MCNC: 69 MG/DL — LOW (ref 70–99)
HADV DNA SPEC QL NAA+PROBE: SIGNIFICANT CHANGE UP
HADV DNA SPEC QL NAA+PROBE: SIGNIFICANT CHANGE UP
HCOV 229E RNA SPEC QL NAA+PROBE: SIGNIFICANT CHANGE UP
HCOV 229E RNA SPEC QL NAA+PROBE: SIGNIFICANT CHANGE UP
HCOV HKU1 RNA SPEC QL NAA+PROBE: SIGNIFICANT CHANGE UP
HCOV HKU1 RNA SPEC QL NAA+PROBE: SIGNIFICANT CHANGE UP
HCOV NL63 RNA SPEC QL NAA+PROBE: SIGNIFICANT CHANGE UP
HCOV NL63 RNA SPEC QL NAA+PROBE: SIGNIFICANT CHANGE UP
HCOV OC43 RNA SPEC QL NAA+PROBE: SIGNIFICANT CHANGE UP
HCOV OC43 RNA SPEC QL NAA+PROBE: SIGNIFICANT CHANGE UP
HCT VFR BLD CALC: 32.6 % — LOW (ref 39–50)
HDLC SERPL-MCNC: 101 MG/DL — SIGNIFICANT CHANGE UP
HGB BLD-MCNC: 11.2 G/DL — LOW (ref 13–17)
HMPV RNA SPEC QL NAA+PROBE: SIGNIFICANT CHANGE UP
HMPV RNA SPEC QL NAA+PROBE: SIGNIFICANT CHANGE UP
HPIV1 RNA SPEC QL NAA+PROBE: SIGNIFICANT CHANGE UP
HPIV1 RNA SPEC QL NAA+PROBE: SIGNIFICANT CHANGE UP
HPIV2 RNA SPEC QL NAA+PROBE: SIGNIFICANT CHANGE UP
HPIV2 RNA SPEC QL NAA+PROBE: SIGNIFICANT CHANGE UP
HPIV3 RNA SPEC QL NAA+PROBE: SIGNIFICANT CHANGE UP
HPIV3 RNA SPEC QL NAA+PROBE: SIGNIFICANT CHANGE UP
HPIV4 RNA SPEC QL NAA+PROBE: SIGNIFICANT CHANGE UP
HPIV4 RNA SPEC QL NAA+PROBE: SIGNIFICANT CHANGE UP
LIPID PNL WITH DIRECT LDL SERPL: 55 MG/DL — SIGNIFICANT CHANGE UP
M PNEUMO DNA SPEC QL NAA+PROBE: SIGNIFICANT CHANGE UP
M PNEUMO DNA SPEC QL NAA+PROBE: SIGNIFICANT CHANGE UP
MAGNESIUM SERPL-MCNC: 1.6 MG/DL — SIGNIFICANT CHANGE UP (ref 1.6–2.6)
MCHC RBC-ENTMCNC: 33 PG — SIGNIFICANT CHANGE UP (ref 27–34)
MCHC RBC-ENTMCNC: 34.4 GM/DL — SIGNIFICANT CHANGE UP (ref 32–36)
MCV RBC AUTO: 96.2 FL — SIGNIFICANT CHANGE UP (ref 80–100)
METHADONE UR-MCNC: NEGATIVE — SIGNIFICANT CHANGE UP
NON HDL CHOLESTEROL: 64 MG/DL — SIGNIFICANT CHANGE UP
NRBC # BLD: 0 /100 WBCS — SIGNIFICANT CHANGE UP (ref 0–0)
NRBC # FLD: 0 K/UL — SIGNIFICANT CHANGE UP (ref 0–0)
OPIATES UR-MCNC: NEGATIVE — SIGNIFICANT CHANGE UP
OXYCODONE UR-MCNC: NEGATIVE — SIGNIFICANT CHANGE UP
PCP SPEC-MCNC: SIGNIFICANT CHANGE UP
PCP UR-MCNC: NEGATIVE — SIGNIFICANT CHANGE UP
PHOSPHATE SERPL-MCNC: 2.7 MG/DL — SIGNIFICANT CHANGE UP (ref 2.5–4.5)
PLATELET # BLD AUTO: 122 K/UL — LOW (ref 150–400)
POTASSIUM SERPL-MCNC: 3.7 MMOL/L — SIGNIFICANT CHANGE UP (ref 3.5–5.3)
POTASSIUM SERPL-SCNC: 3.7 MMOL/L — SIGNIFICANT CHANGE UP (ref 3.5–5.3)
PROT SERPL-MCNC: 6.7 G/DL — SIGNIFICANT CHANGE UP (ref 6–8.3)
RAPID RVP RESULT: SIGNIFICANT CHANGE UP
RAPID RVP RESULT: SIGNIFICANT CHANGE UP
RBC # BLD: 3.39 M/UL — LOW (ref 4.2–5.8)
RBC # FLD: 13.4 % — SIGNIFICANT CHANGE UP (ref 10.3–14.5)
RSV RNA SPEC QL NAA+PROBE: SIGNIFICANT CHANGE UP
RSV RNA SPEC QL NAA+PROBE: SIGNIFICANT CHANGE UP
RV+EV RNA SPEC QL NAA+PROBE: SIGNIFICANT CHANGE UP
RV+EV RNA SPEC QL NAA+PROBE: SIGNIFICANT CHANGE UP
SARS-COV-2 RNA SPEC QL NAA+PROBE: SIGNIFICANT CHANGE UP
SARS-COV-2 RNA SPEC QL NAA+PROBE: SIGNIFICANT CHANGE UP
SODIUM SERPL-SCNC: 142 MMOL/L — SIGNIFICANT CHANGE UP (ref 135–145)
THC UR QL: POSITIVE
TRIGL SERPL-MCNC: 45 MG/DL — SIGNIFICANT CHANGE UP
WBC # BLD: 4.23 K/UL — SIGNIFICANT CHANGE UP (ref 3.8–10.5)
WBC # FLD AUTO: 4.23 K/UL — SIGNIFICANT CHANGE UP (ref 3.8–10.5)

## 2024-01-16 PROCEDURE — 99233 SBSQ HOSP IP/OBS HIGH 50: CPT

## 2024-01-16 RX ORDER — MAGNESIUM SULFATE 500 MG/ML
2 VIAL (ML) INJECTION ONCE
Refills: 0 | Status: COMPLETED | OUTPATIENT
Start: 2024-01-16 | End: 2024-01-16

## 2024-01-16 RX ADMIN — Medication 100 MILLIGRAM(S): at 12:08

## 2024-01-16 RX ADMIN — Medication 2 MILLIGRAM(S): at 08:48

## 2024-01-16 RX ADMIN — Medication 2 MILLIGRAM(S): at 04:16

## 2024-01-16 RX ADMIN — Medication 1 PATCH: at 20:57

## 2024-01-16 RX ADMIN — Medication 1 MILLIGRAM(S): at 12:08

## 2024-01-16 RX ADMIN — Medication 1 TABLET(S): at 12:07

## 2024-01-16 RX ADMIN — SODIUM CHLORIDE 75 MILLILITER(S): 9 INJECTION, SOLUTION INTRAVENOUS at 12:07

## 2024-01-16 RX ADMIN — Medication 25 GRAM(S): at 14:38

## 2024-01-16 RX ADMIN — Medication 1 PATCH: at 08:20

## 2024-01-16 RX ADMIN — Medication 1 PATCH: at 19:03

## 2024-01-16 RX ADMIN — Medication 2 MILLIGRAM(S): at 12:08

## 2024-01-16 RX ADMIN — SODIUM CHLORIDE 75 MILLILITER(S): 9 INJECTION, SOLUTION INTRAVENOUS at 15:18

## 2024-01-16 RX ADMIN — Medication 81 MILLIGRAM(S): at 12:08

## 2024-01-16 RX ADMIN — Medication 1.5 MILLIGRAM(S): at 20:58

## 2024-01-16 RX ADMIN — Medication 1.5 MILLIGRAM(S): at 16:32

## 2024-01-16 RX ADMIN — Medication 2 MILLIGRAM(S): at 00:28

## 2024-01-16 NOTE — ED ADULT NURSE REASSESSMENT NOTE - NS ED NURSE REASSESS COMMENT FT1
Patient remains at baseline mental status. Appears to be resting comfortably in stretcher, breathing even and unlabored on room air. Vital signs as charted. NAD noted at this time. NSR on monitor. CIWA protocol maintained. scoring 4 on CIWA scale at this time. Medications administered as ordered as per eMAR. Safety maintained, stretcher locked in lowest position with siderails up x2.

## 2024-01-16 NOTE — ED ADULT NURSE REASSESSMENT NOTE - NS ED NURSE REASSESS COMMENT FT1
Patient received from night RN Vanesa at 0820. Pt remains at baseline mental status, appears to be resting comfortably in stretcher. No acute distress noted. Respirations even and unlabored. NSR on monitor. VS as charted. CIWA protocol in place/maintained. scoring 4 on CIWA scale at this time. Medications administered as ordered as per eMAR. Fingerstick completed as ordered. PO apple juice given to pt at this time as requested by pt. Safety maintained, bed locked in lowest position w/ x2 siderails up. Admitted to tele, awaiting bed placement.

## 2024-01-16 NOTE — ED ADULT NURSE REASSESSMENT NOTE - NS ED NURSE REASSESS COMMENT FT1
upon reassessment, pt resting comfortably in stretcher upon reassessment, pt resting comfortably in stretcher offering no complaints. respirations even and unlabored. remains on continuous monitor, NSR noted. CIWA 5 at this time. medicated as per orders. no acute distress noted. awaiting bed assignment, safety maintained, side rails up

## 2024-01-16 NOTE — ED ADULT NURSE REASSESSMENT NOTE - NS ED NURSE REASSESS COMMENT FT1
pt resting comfortably in stretcher at this time, c/o HA and thirsty. pt given water at this time. medicated as per orders. respirations even and unlabored. remains on continuous monitor, sinus tachycardia noted. CIWA as noted in chart. calm and cooperative at this time. awaiting bed assignment. safety maintained, side rails up

## 2024-01-16 NOTE — ED ADULT NURSE REASSESSMENT NOTE - NS ED NURSE REASSESS COMMENT FT1
Break RN: Pt is resting in stretcher with no complaints at this time. Respirations even and unlabored, chest rise equal b/l. Sinus rhythm noted on cardiac monitor. No acute distress noted. Safety maintained throughout.

## 2024-01-16 NOTE — ED ADULT NURSE REASSESSMENT NOTE - NS ED NURSE REASSESS COMMENT FT1
Patient remains at baseline mental status. Appears to be resting comfortably in stretcher, breathing even and unlabored on room air. Vital signs as charted. NAD noted at this time. NSR on monitor. CIWA protocol maintained. scoring 4 on CIWA scale at this time. Medications administered as ordered as per eMAR. continuous infusion of LR running at 75 ml/hr as ordered as per eMAR. Safety maintained, stretcher locked in lowest position with siderails up x2, call bell and personal items within reach.

## 2024-01-16 NOTE — ED ADULT NURSE REASSESSMENT NOTE - NS ED NURSE REASSESS COMMENT FT1
Pt lethargic and oriented x 3, easy to arouse via verbal stimuli. CIWA-2. Shows no signs of acute distress. VSS. Respirations even and unlabored. Continuous telemetry and SpO2 monitoring in place. Pending scheduled dose of Ativan and Nicotine patch placement. Admitted for ETOH withdrawal, will continue to monitor. Pt lethargic and oriented x 3, easy to arouse via verbal stimuli. CIWA-5. Shows no signs of acute distress. VSS. Respirations even and unlabored. Continuous telemetry and SpO2 monitoring in place. Pending scheduled dose of Ativan and Nicotine patch placement. Admitted for ETOH withdrawal, will continue to monitor.

## 2024-01-16 NOTE — PROGRESS NOTE ADULT - SUBJECTIVE AND OBJECTIVE BOX
[facility] Division of Hospital Medicine  June Garcia MD  Pager (M-F, 8A-5P): [phone] or TEAMS  Other Times:  [phone]      SUBJECTIVE / OVERNIGHT EVENTS: Pt in bed, calm, though still tremulous. Still having intermittent chest pain. Tele notable for episode of sinus tach to 150s, now w rates in the 90-100s.    MEDICATIONS  (STANDING):  aspirin  chewable 81 milliGRAM(s) Oral daily  folic acid 1 milliGRAM(s) Oral daily  lactated ringers. 1000 milliLiter(s) (75 mL/Hr) IV Continuous <Continuous>  LORazepam   Injectable 1.5 milliGRAM(s) IV Push every 4 hours  LORazepam   Injectable   IV Push   multivitamin 1 Tablet(s) Oral daily  nicotine - 21 mG/24Hr(s) Patch 1 Patch Transdermal daily  thiamine 100 milliGRAM(s) Oral daily    MEDICATIONS  (PRN):  acetaminophen     Tablet .. 650 milliGRAM(s) Oral every 6 hours PRN Temp greater or equal to 38C (100.4F), Mild Pain (1 - 3)  aluminum hydroxide/magnesium hydroxide/simethicone Suspension 30 milliLiter(s) Oral every 4 hours PRN Dyspepsia  LORazepam   Injectable 2 milliGRAM(s) IV Push every 2 hours PRN CIWA-Ar score increase by 2 points and a total score of 7 or less  LORazepam   Injectable 2 milliGRAM(s) IV Push every 1 hour PRN CIWA-Ar score 8 or greater  melatonin 3 milliGRAM(s) Oral at bedtime PRN Insomnia  ondansetron Injectable 4 milliGRAM(s) IV Push every 8 hours PRN Nausea and/or Vomiting      I&O's Summary      PHYSICAL EXAM:  Vital Signs Last 24 Hrs  T(C): 36.9 (16 Jan 2024 12:05), Max: 37.4 (15 Adiel 2024 21:00)  T(F): 98.5 (16 Jan 2024 12:05), Max: 99.3 (15 Adiel 2024 21:00)  HR: 94 (16 Jan 2024 12:05) (83 - 115)  BP: 136/91 (16 Jan 2024 12:05) (100/70 - 142/96)  BP(mean): --  RR: 16 (16 Jan 2024 12:05) (16 - 18)  SpO2: 99% (16 Jan 2024 12:05) (98% - 100%)    Parameters below as of 16 Jan 2024 12:05  Patient On (Oxygen Delivery Method): room air      CONSTITUTIONAL: NAD  EYES: PERRLA; conjunctiva and sclera clear  ENMT: Moist oral mucosa, no pharyngeal injection or exudates; poor dentition  RESPIRATORY: Normal respiratory effort  CARDIOVASCULAR: Regular rate and rhythm, normal S1 and S2, no murmur/rub/gallop; No lower extremity edema  ABDOMEN: Nontender to palpation, normoactive bowel sounds,  PSYCH: A+O to person, place, and time; affect appropriate  SKIN: No rashes; no palpable lesions    LABS:                        11.2   4.23  )-----------( 122      ( 16 Jan 2024 05:53 )             32.6     01-16    142  |  104  |  10  ----------------------------<  69<L>  3.7   |  24  |  0.62    Ca    8.5      16 Jan 2024 05:53  Phos  2.7     01-16  Mg     1.60     01-16    TPro  6.7  /  Alb  3.5  /  TBili  0.6  /  DBili  x   /  AST  89<H>  /  ALT  37  /  AlkPhos  100  01-16          Urinalysis Basic - ( 16 Jan 2024 05:53 )    Color: x / Appearance: x / SG: x / pH: x  Gluc: 69 mg/dL / Ketone: x  / Bili: x / Urobili: x   Blood: x / Protein: x / Nitrite: x   Leuk Esterase: x / RBC: x / WBC x   Sq Epi: x / Non Sq Epi: x / Bacteria: x          RADIOLOGY & ADDITIONAL TESTS:  Results Reviewed:   Imaging Personally Reviewed:  Electrocardiogram Personally Reviewed:    COORDINATION OF CARE:  Care Discussed with Consultants/Other Providers [Y/N]:  Prior or Outpatient Records Reviewed [Y/N]:   [facility] Division of Hospital Medicine  June Garcia MD  Pager (M-F, 8A-5P): [phone] or TEAMS  Other Times:  [phone]      SUBJECTIVE / OVERNIGHT EVENTS: Pt in bed, calm, though still tremulous. Still having intermittent chest pain. Tele notable for episode of sinus tach to 150s, now w rates in the 90-100s.    MEDICATIONS  (STANDING):  aspirin  chewable 81 milliGRAM(s) Oral daily  folic acid 1 milliGRAM(s) Oral daily  lactated ringers. 1000 milliLiter(s) (75 mL/Hr) IV Continuous <Continuous>  LORazepam   Injectable 1.5 milliGRAM(s) IV Push every 4 hours  LORazepam   Injectable   IV Push   multivitamin 1 Tablet(s) Oral daily  nicotine - 21 mG/24Hr(s) Patch 1 Patch Transdermal daily  thiamine 100 milliGRAM(s) Oral daily    MEDICATIONS  (PRN):  acetaminophen     Tablet .. 650 milliGRAM(s) Oral every 6 hours PRN Temp greater or equal to 38C (100.4F), Mild Pain (1 - 3)  aluminum hydroxide/magnesium hydroxide/simethicone Suspension 30 milliLiter(s) Oral every 4 hours PRN Dyspepsia  LORazepam   Injectable 2 milliGRAM(s) IV Push every 2 hours PRN CIWA-Ar score increase by 2 points and a total score of 7 or less  LORazepam   Injectable 2 milliGRAM(s) IV Push every 1 hour PRN CIWA-Ar score 8 or greater  melatonin 3 milliGRAM(s) Oral at bedtime PRN Insomnia  ondansetron Injectable 4 milliGRAM(s) IV Push every 8 hours PRN Nausea and/or Vomiting      I&O's Summary      PHYSICAL EXAM:  Vital Signs Last 24 Hrs  T(C): 36.9 (16 Jan 2024 12:05), Max: 37.4 (15 Adiel 2024 21:00)  T(F): 98.5 (16 Jan 2024 12:05), Max: 99.3 (15 Adiel 2024 21:00)  HR: 94 (16 Jan 2024 12:05) (83 - 115)  BP: 136/91 (16 Jan 2024 12:05) (100/70 - 142/96)  BP(mean): --  RR: 16 (16 Jan 2024 12:05) (16 - 18)  SpO2: 99% (16 Jan 2024 12:05) (98% - 100%)    Parameters below as of 16 Jan 2024 12:05  Patient On (Oxygen Delivery Method): room air      CONSTITUTIONAL: NAD  EYES: PERRLA; conjunctiva and sclera clear  ENMT: Moist oral mucosa, no pharyngeal injection or exudates; poor dentition  RESPIRATORY: Normal respiratory effort  CARDIOVASCULAR: Regular rate and rhythm, normal S1 and S2, no murmur/rub/gallop; No lower extremity edema  ABDOMEN: Nontender to palpation, normoactive bowel sounds,  PSYCH: A+O to person, place, and time; affect appropriate  SKIN: No rashes; no palpable lesions    LABS:                        11.2   4.23  )-----------( 122      ( 16 Jan 2024 05:53 )             32.6     01-16    142  |  104  |  10  ----------------------------<  69<L>  3.7   |  24  |  0.62    Ca    8.5      16 Jan 2024 05:53  Phos  2.7     01-16  Mg     1.60     01-16    TPro  6.7  /  Alb  3.5  /  TBili  0.6  /  DBili  x   /  AST  89<H>  /  ALT  37  /  AlkPhos  100  01-16          Urinalysis Basic - ( 16 Jan 2024 05:53 )    Color: x / Appearance: x / SG: x / pH: x  Gluc: 69 mg/dL / Ketone: x  / Bili: x / Urobili: x   Blood: x / Protein: x / Nitrite: x   Leuk Esterase: x / RBC: x / WBC x   Sq Epi: x / Non Sq Epi: x / Bacteria: x          RADIOLOGY & ADDITIONAL TESTS:  Results Reviewed:   Imaging Personally Reviewed:  Electrocardiogram Personally Reviewed:    COORDINATION OF CARE:  Care Discussed with Consultants/Other Providers [Y/N]:  Prior or Outpatient Records Reviewed [Y/N]:

## 2024-01-17 LAB
ALBUMIN SERPL ELPH-MCNC: 3.6 G/DL — SIGNIFICANT CHANGE UP (ref 3.3–5)
ALP SERPL-CCNC: 96 U/L — SIGNIFICANT CHANGE UP (ref 40–120)
ALT FLD-CCNC: 32 U/L — SIGNIFICANT CHANGE UP (ref 4–41)
ANION GAP SERPL CALC-SCNC: 13 MMOL/L — SIGNIFICANT CHANGE UP (ref 7–14)
APTT BLD: 31.2 SEC — SIGNIFICANT CHANGE UP (ref 24.5–35.6)
AST SERPL-CCNC: 63 U/L — HIGH (ref 4–40)
BILIRUB SERPL-MCNC: 0.8 MG/DL — SIGNIFICANT CHANGE UP (ref 0.2–1.2)
BUN SERPL-MCNC: 7 MG/DL — SIGNIFICANT CHANGE UP (ref 7–23)
CALCIUM SERPL-MCNC: 8.7 MG/DL — SIGNIFICANT CHANGE UP (ref 8.4–10.5)
CHLORIDE SERPL-SCNC: 99 MMOL/L — SIGNIFICANT CHANGE UP (ref 98–107)
CO2 SERPL-SCNC: 24 MMOL/L — SIGNIFICANT CHANGE UP (ref 22–31)
CREAT SERPL-MCNC: 0.58 MG/DL — SIGNIFICANT CHANGE UP (ref 0.5–1.3)
EGFR: 123 ML/MIN/1.73M2 — SIGNIFICANT CHANGE UP
GLUCOSE SERPL-MCNC: 76 MG/DL — SIGNIFICANT CHANGE UP (ref 70–99)
HCT VFR BLD CALC: 35.1 % — LOW (ref 39–50)
HGB BLD-MCNC: 12.1 G/DL — LOW (ref 13–17)
INR BLD: 1.06 RATIO — SIGNIFICANT CHANGE UP (ref 0.85–1.18)
MAGNESIUM SERPL-MCNC: 1.8 MG/DL — SIGNIFICANT CHANGE UP (ref 1.6–2.6)
MCHC RBC-ENTMCNC: 33.2 PG — SIGNIFICANT CHANGE UP (ref 27–34)
MCHC RBC-ENTMCNC: 34.5 GM/DL — SIGNIFICANT CHANGE UP (ref 32–36)
MCV RBC AUTO: 96.2 FL — SIGNIFICANT CHANGE UP (ref 80–100)
NRBC # BLD: 0 /100 WBCS — SIGNIFICANT CHANGE UP (ref 0–0)
NRBC # FLD: 0 K/UL — SIGNIFICANT CHANGE UP (ref 0–0)
PLATELET # BLD AUTO: 131 K/UL — LOW (ref 150–400)
POTASSIUM SERPL-MCNC: 3.5 MMOL/L — SIGNIFICANT CHANGE UP (ref 3.5–5.3)
POTASSIUM SERPL-SCNC: 3.5 MMOL/L — SIGNIFICANT CHANGE UP (ref 3.5–5.3)
PROT SERPL-MCNC: 7.3 G/DL — SIGNIFICANT CHANGE UP (ref 6–8.3)
PROTHROM AB SERPL-ACNC: 12 SEC — SIGNIFICANT CHANGE UP (ref 9.5–13)
RBC # BLD: 3.65 M/UL — LOW (ref 4.2–5.8)
RBC # FLD: 12.9 % — SIGNIFICANT CHANGE UP (ref 10.3–14.5)
SODIUM SERPL-SCNC: 136 MMOL/L — SIGNIFICANT CHANGE UP (ref 135–145)
WBC # BLD: 5.27 K/UL — SIGNIFICANT CHANGE UP (ref 3.8–10.5)
WBC # FLD AUTO: 5.27 K/UL — SIGNIFICANT CHANGE UP (ref 3.8–10.5)

## 2024-01-17 PROCEDURE — 99233 SBSQ HOSP IP/OBS HIGH 50: CPT

## 2024-01-17 RX ORDER — INFLUENZA VIRUS VACCINE 15; 15; 15; 15 UG/.5ML; UG/.5ML; UG/.5ML; UG/.5ML
0.5 SUSPENSION INTRAMUSCULAR ONCE
Refills: 0 | Status: DISCONTINUED | OUTPATIENT
Start: 2024-01-17 | End: 2024-02-14

## 2024-01-17 RX ORDER — GABAPENTIN 400 MG/1
300 CAPSULE ORAL
Refills: 0 | Status: DISCONTINUED | OUTPATIENT
Start: 2024-01-17 | End: 2024-01-18

## 2024-01-17 RX ADMIN — Medication 1.5 MILLIGRAM(S): at 08:46

## 2024-01-17 RX ADMIN — SODIUM CHLORIDE 75 MILLILITER(S): 9 INJECTION, SOLUTION INTRAVENOUS at 04:50

## 2024-01-17 RX ADMIN — Medication 1 MILLIGRAM(S): at 20:59

## 2024-01-17 RX ADMIN — Medication 1.5 MILLIGRAM(S): at 04:50

## 2024-01-17 RX ADMIN — Medication 650 MILLIGRAM(S): at 21:00

## 2024-01-17 RX ADMIN — Medication 1 TABLET(S): at 12:39

## 2024-01-17 RX ADMIN — Medication 1 MILLIGRAM(S): at 12:39

## 2024-01-17 RX ADMIN — Medication 1.5 MILLIGRAM(S): at 12:44

## 2024-01-17 RX ADMIN — Medication 1 PATCH: at 12:38

## 2024-01-17 RX ADMIN — Medication 100 MILLIGRAM(S): at 12:39

## 2024-01-17 RX ADMIN — Medication 1 MILLIGRAM(S): at 16:36

## 2024-01-17 RX ADMIN — Medication 81 MILLIGRAM(S): at 12:39

## 2024-01-17 RX ADMIN — Medication 1.5 MILLIGRAM(S): at 00:46

## 2024-01-17 NOTE — ED ADULT NURSE REASSESSMENT NOTE - NS ED NURSE REASSESS COMMENT FT1
Pt lethargic and oriented x 3, easy to arouse via verbal stimuli. CIWA-4. Shows no signs of acute distress. VSS. Respirations even and unlabored. Continuous telemetry and SpO2 monitoring in place. Admitted for ETOH withdrawal, pending room placement. Will continue to monitor.

## 2024-01-17 NOTE — PATIENT PROFILE ADULT - FALL HARM RISK - HARM RISK INTERVENTIONS
Assistance with ambulation/Assistance OOB with selected safe patient handling equipment/Communicate Risk of Fall with Harm to all staff/Discuss with provider need for PT consult/Monitor for mental status changes/Monitor gait and stability/Reinforce activity limits and safety measures with patient and family/Tailored Fall Risk Interventions/Toileting schedule using arm’s reach rule for commode and bathroom/Use of alarms - bed, chair and/or voice tab/Visual Cue: Yellow wristband and red socks/Bed in lowest position, wheels locked, appropriate side rails in place/Call bell, personal items and telephone in reach/Instruct patient to call for assistance before getting out of bed or chair/Non-slip footwear when patient is out of bed/[place] to call system/Physically safe environment - no spills, clutter or unnecessary equipment/Purposeful Proactive Rounding/Room/bathroom lighting operational, light cord in reach

## 2024-01-17 NOTE — PATIENT PROFILE ADULT - DO YOU LACK THE NECESSARY SUPPORT TO HELP YOU COPE WITH LIFE CHALLENGES?
Detail Level: Detailed General Sunscreen Counseling: I recommended a broad spectrum sunscreen with a SPF of 30 or higher. I explained that SPF 30 sunscreens block approximately 97 percent of the sun's harmful rays. Sunscreens should be applied at least 15 minutes prior to expected sun exposure and then every 2 hours after that as long as sun exposure continues. If swimming or exercising sunscreen should be reapplied every 45 minutes to an hour after getting wet or sweating. One ounce, or the equivalent of a shot glass full of sunscreen, is adequate to protect the skin not covered by a bathing suit. I also recommended a lip balm with a sunscreen as well. Sun protective clothing can be used in lieu of sunscreen but must be worn the entire time you are exposed to the sun's rays. no Same Histology In Subsequent Stages Text: The pattern and morphology of the tumor is as described in the first stage.

## 2024-01-17 NOTE — ED ADULT NURSE REASSESSMENT NOTE - NS ED NURSE REASSESS COMMENT FT1
Pt received in hallway spot 5A, in no acute distress, CIWA completed as per flow sheet. Pt medicated as per eMAR. Pending bed assignment.

## 2024-01-17 NOTE — ED ADULT NURSE REASSESSMENT NOTE - NS ED NURSE REASSESS COMMENT FT1
Break RN: Pt is resting in stretcher, responsive to verbal and physical stimuli. Respirations even and unlabored, chest rise equal b/l. Sinus rhythm noted on cardiac monitor. No acute distress noted. Safety maintained throughout.

## 2024-01-17 NOTE — PROGRESS NOTE ADULT - PROBLEM SELECTOR PLAN 2
Sinus tachycardia likely 2/2 acute alcohol withdrawal, however pt also complaining of intermittent chest pain  - s/p IVF   - check TTE  - check urine tox to evaluate for cocaine use  - continue tele monitoring

## 2024-01-17 NOTE — PROGRESS NOTE ADULT - SUBJECTIVE AND OBJECTIVE BOX
[facility] Division of Hospital Medicine  June Garcia MD  Pager (M-F, 8A-5P): [phone] or [phone]  Other Times:  [phone]      SUBJECTIVE / OVERNIGHT EVENTS: Pt having some pain in his feet, says this has been an issue in the past. Describes as numbness and tingling and pain with ambulation. Tele notable for sinus tach 115s. Intermittently HTNsive overnight. No tremors, no diarrhea, no vomiting.    MEDICATIONS  (STANDING):  aspirin  chewable 81 milliGRAM(s) Oral daily  folic acid 1 milliGRAM(s) Oral daily  influenza   Vaccine 0.5 milliLiter(s) IntraMuscular once  lactated ringers. 1000 milliLiter(s) (75 mL/Hr) IV Continuous <Continuous>  LORazepam   Injectable   IV Push   LORazepam   Injectable 1 milliGRAM(s) IV Push every 4 hours  multivitamin 1 Tablet(s) Oral daily  nicotine - 21 mG/24Hr(s) Patch 1 Patch Transdermal daily  thiamine 100 milliGRAM(s) Oral daily    MEDICATIONS  (PRN):  acetaminophen     Tablet .. 650 milliGRAM(s) Oral every 6 hours PRN Temp greater or equal to 38C (100.4F), Mild Pain (1 - 3)  aluminum hydroxide/magnesium hydroxide/simethicone Suspension 30 milliLiter(s) Oral every 4 hours PRN Dyspepsia  gabapentin 300 milliGRAM(s) Oral two times a day PRN lower extremity pain  LORazepam   Injectable 2 milliGRAM(s) IV Push every 2 hours PRN CIWA-Ar score increase by 2 points and a total score of 7 or less  LORazepam   Injectable 2 milliGRAM(s) IV Push every 1 hour PRN CIWA-Ar score 8 or greater  melatonin 3 milliGRAM(s) Oral at bedtime PRN Insomnia  ondansetron Injectable 4 milliGRAM(s) IV Push every 8 hours PRN Nausea and/or Vomiting      I&O's Summary      PHYSICAL EXAM:  Vital Signs Last 24 Hrs  T(C): 37 (17 Jan 2024 12:35), Max: 37.2 (17 Jan 2024 00:34)  T(F): 98.6 (17 Jan 2024 12:35), Max: 98.9 (17 Jan 2024 00:34)  HR: 88 (17 Jan 2024 12:35) (86 - 106)  BP: 135/80 (17 Jan 2024 12:35) (125/85 - 143/98)  BP(mean): --  RR: 16 (17 Jan 2024 12:35) (12 - 17)  SpO2: 99% (17 Jan 2024 12:35) (99% - 100%)    Parameters below as of 17 Jan 2024 12:35  Patient On (Oxygen Delivery Method): room air      EYES: PERRLA; conjunctiva and sclera clear  ENMT: Moist oral mucosa, no pharyngeal injection or exudates; poor dentition  RESPIRATORY: Normal respiratory effort  CARDIOVASCULAR: Regular rate and rhythm, normal S1 and S2, no murmur/rub/gallop; No lower extremity edema  ABDOMEN: Nontender to palpation, normoactive bowel sounds,  PSYCH: A+O to person, place, and time; affect appropriate  No tremors  SKIN: No rashes; no palpable lesions, xerosis of skin, onychomycosis bilaterally of toenails, no lesions on feet, DP 2+ bilaterally    LABS:                        12.1   5.27  )-----------( 131      ( 17 Jan 2024 06:10 )             35.1     01-17    136  |  99  |  7   ----------------------------<  76  3.5   |  24  |  0.58    Ca    8.7      17 Jan 2024 06:10  Phos  2.7     01-16  Mg     1.60     01-16    TPro  7.3  /  Alb  3.6  /  TBili  0.8  /  DBili  x   /  AST  63<H>  /  ALT  32  /  AlkPhos  96  01-17    PT/INR - ( 17 Jan 2024 06:10 )   PT: 12.0 sec;   INR: 1.06 ratio         PTT - ( 17 Jan 2024 06:10 )  PTT:31.2 sec      Urinalysis Basic - ( 17 Jan 2024 06:10 )    Color: x / Appearance: x / SG: x / pH: x  Gluc: 76 mg/dL / Ketone: x  / Bili: x / Urobili: x   Blood: x / Protein: x / Nitrite: x   Leuk Esterase: x / RBC: x / WBC x   Sq Epi: x / Non Sq Epi: x / Bacteria: x          RADIOLOGY & ADDITIONAL TESTS:  Results Reviewed:   Imaging Personally Reviewed:  Electrocardiogram Personally Reviewed:    COORDINATION OF CARE:  Care Discussed with Consultants/Other Providers [Y/N]:  Prior or Outpatient Records Reviewed [Y/N]:

## 2024-01-18 LAB
ANION GAP SERPL CALC-SCNC: 13 MMOL/L — SIGNIFICANT CHANGE UP (ref 7–14)
BUN SERPL-MCNC: 6 MG/DL — LOW (ref 7–23)
CALCIUM SERPL-MCNC: 9.3 MG/DL — SIGNIFICANT CHANGE UP (ref 8.4–10.5)
CHLORIDE SERPL-SCNC: 101 MMOL/L — SIGNIFICANT CHANGE UP (ref 98–107)
CK MB CFR SERPL CALC: 2.1 NG/ML — SIGNIFICANT CHANGE UP
CO2 SERPL-SCNC: 24 MMOL/L — SIGNIFICANT CHANGE UP (ref 22–31)
CREAT SERPL-MCNC: 0.57 MG/DL — SIGNIFICANT CHANGE UP (ref 0.5–1.3)
EGFR: 123 ML/MIN/1.73M2 — SIGNIFICANT CHANGE UP
GLUCOSE SERPL-MCNC: 88 MG/DL — SIGNIFICANT CHANGE UP (ref 70–99)
HCT VFR BLD CALC: 39.1 % — SIGNIFICANT CHANGE UP (ref 39–50)
HGB BLD-MCNC: 13.5 G/DL — SIGNIFICANT CHANGE UP (ref 13–17)
MAGNESIUM SERPL-MCNC: 1.8 MG/DL — SIGNIFICANT CHANGE UP (ref 1.6–2.6)
MCHC RBC-ENTMCNC: 33.1 PG — SIGNIFICANT CHANGE UP (ref 27–34)
MCHC RBC-ENTMCNC: 34.5 GM/DL — SIGNIFICANT CHANGE UP (ref 32–36)
MCV RBC AUTO: 95.8 FL — SIGNIFICANT CHANGE UP (ref 80–100)
MRSA PCR RESULT.: SIGNIFICANT CHANGE UP
NRBC # BLD: 0 /100 WBCS — SIGNIFICANT CHANGE UP (ref 0–0)
NRBC # FLD: 0 K/UL — SIGNIFICANT CHANGE UP (ref 0–0)
PHOSPHATE SERPL-MCNC: 3.7 MG/DL — SIGNIFICANT CHANGE UP (ref 2.5–4.5)
PLATELET # BLD AUTO: 147 K/UL — LOW (ref 150–400)
POTASSIUM SERPL-MCNC: 3.5 MMOL/L — SIGNIFICANT CHANGE UP (ref 3.5–5.3)
POTASSIUM SERPL-SCNC: 3.5 MMOL/L — SIGNIFICANT CHANGE UP (ref 3.5–5.3)
RBC # BLD: 4.08 M/UL — LOW (ref 4.2–5.8)
RBC # FLD: 12.5 % — SIGNIFICANT CHANGE UP (ref 10.3–14.5)
S AUREUS DNA NOSE QL NAA+PROBE: SIGNIFICANT CHANGE UP
SODIUM SERPL-SCNC: 138 MMOL/L — SIGNIFICANT CHANGE UP (ref 135–145)
TROPONIN T, HIGH SENSITIVITY RESULT: 6 NG/L — SIGNIFICANT CHANGE UP
WBC # BLD: 5.33 K/UL — SIGNIFICANT CHANGE UP (ref 3.8–10.5)
WBC # FLD AUTO: 5.33 K/UL — SIGNIFICANT CHANGE UP (ref 3.8–10.5)

## 2024-01-18 PROCEDURE — 99233 SBSQ HOSP IP/OBS HIGH 50: CPT

## 2024-01-18 PROCEDURE — 93010 ELECTROCARDIOGRAM REPORT: CPT

## 2024-01-18 RX ORDER — GABAPENTIN 400 MG/1
100 CAPSULE ORAL THREE TIMES A DAY
Refills: 0 | Status: DISCONTINUED | OUTPATIENT
Start: 2024-01-18 | End: 2024-01-22

## 2024-01-18 RX ORDER — CHLORHEXIDINE GLUCONATE 213 G/1000ML
1 SOLUTION TOPICAL DAILY
Refills: 0 | Status: DISCONTINUED | OUTPATIENT
Start: 2024-01-18 | End: 2024-02-14

## 2024-01-18 RX ADMIN — Medication 1 MILLIGRAM(S): at 14:28

## 2024-01-18 RX ADMIN — Medication 1 PATCH: at 11:55

## 2024-01-18 RX ADMIN — Medication 1 PATCH: at 19:45

## 2024-01-18 RX ADMIN — Medication 0.5 MILLIGRAM(S): at 21:58

## 2024-01-18 RX ADMIN — GABAPENTIN 100 MILLIGRAM(S): 400 CAPSULE ORAL at 13:17

## 2024-01-18 RX ADMIN — Medication 1 MILLIGRAM(S): at 00:46

## 2024-01-18 RX ADMIN — Medication 100 MILLIGRAM(S): at 11:57

## 2024-01-18 RX ADMIN — Medication 1 MILLIGRAM(S): at 04:49

## 2024-01-18 RX ADMIN — Medication 1 PATCH: at 12:00

## 2024-01-18 RX ADMIN — CHLORHEXIDINE GLUCONATE 1 APPLICATION(S): 213 SOLUTION TOPICAL at 14:28

## 2024-01-18 RX ADMIN — Medication 1 MILLIGRAM(S): at 11:56

## 2024-01-18 RX ADMIN — GABAPENTIN 100 MILLIGRAM(S): 400 CAPSULE ORAL at 21:57

## 2024-01-18 RX ADMIN — Medication 1 TABLET(S): at 11:56

## 2024-01-18 RX ADMIN — Medication 0.5 MILLIGRAM(S): at 18:17

## 2024-01-18 RX ADMIN — Medication 1 MILLIGRAM(S): at 09:49

## 2024-01-18 RX ADMIN — Medication 81 MILLIGRAM(S): at 11:57

## 2024-01-18 NOTE — PROGRESS NOTE ADULT - PROBLEM SELECTOR PLAN 2
Sinus tachycardia likely 2/2 acute alcohol withdrawal, however pt also complaining of intermittent chest pain  - s/p IVF   - check TTE  - urine tox positive for THC and Benzos  - continue tele monitoring PMD

## 2024-01-18 NOTE — DIETITIAN INITIAL EVALUATION ADULT - ADD RECOMMEND
1. Encourage & assist Pt with meals; Monitor PO diet tolerance;   2. Honor food preferences;   3. Continue Multivitamin, Folic Acid, Thiamin as ordered;   4. Monitor labs, hydration status;

## 2024-01-18 NOTE — DIETITIAN INITIAL EVALUATION ADULT - OTHER INFO
Pt 44 yo male with PMHx of ETOH abuse (drinks 15-20 drinks daily), prior history of alcohol withdrawal presented with alcohol withdrawal and chest pain - per chart review.     At time of visit, Pt awake, alert. Per Pt, his appetite good now, but PTA Pt was not eating that well. Food preferences discussed; Rec to add PO supplement: Ensure Plus HP - 2x daily to diet rx. No report of chewing or swallowing difficulty; no report of vomiting or diarrhea @ this time. Per Pt, his height: ~71" & his weight: ~143#, PTA. Pt with weight loss of ~25# in 3 months 2/2 consuming unhealthy food/food options. Of note, Pt with hx of Pt with limited knowledge of sobriety nutrition therapy; better food options discussed with Pt. No other food related concerns voiced at present. RD remains available, Pt made aware.

## 2024-01-18 NOTE — PROGRESS NOTE ADULT - SUBJECTIVE AND OBJECTIVE BOX
Patient is a 43y old  Male who presents with a chief complaint of Alcohol use with withdrawal     (18 Jan 2024 11:20)      SUBJECTIVE / OVERNIGHT EVENTS: Pt seen and examined at 11:40am, overnight events noted, pt c/o chest pain, pain in his arms and legs, (appears very comfortable) no other new issues reported.    MEDICATIONS  (STANDING):  aspirin  chewable 81 milliGRAM(s) Oral daily  chlorhexidine 2% Cloths 1 Application(s) Topical daily  folic acid 1 milliGRAM(s) Oral daily  gabapentin 100 milliGRAM(s) Oral three times a day  influenza   Vaccine 0.5 milliLiter(s) IntraMuscular once  LORazepam   Injectable 0.5 milliGRAM(s) IV Push every 4 hours  LORazepam   Injectable   IV Push   multivitamin 1 Tablet(s) Oral daily  nicotine - 21 mG/24Hr(s) Patch 1 Patch Transdermal daily    MEDICATIONS  (PRN):  acetaminophen     Tablet .. 650 milliGRAM(s) Oral every 6 hours PRN Temp greater or equal to 38C (100.4F), Mild Pain (1 - 3)  aluminum hydroxide/magnesium hydroxide/simethicone Suspension 30 milliLiter(s) Oral every 4 hours PRN Dyspepsia  LORazepam   Injectable 2 milliGRAM(s) IV Push every 2 hours PRN CIWA-Ar score increase by 2 points and a total score of 7 or less  LORazepam   Injectable 2 milliGRAM(s) IV Push every 1 hour PRN CIWA-Ar score 8 or greater  melatonin 3 milliGRAM(s) Oral at bedtime PRN Insomnia  ondansetron Injectable 4 milliGRAM(s) IV Push every 8 hours PRN Nausea and/or Vomiting      Vital Signs Last 24 Hrs  T(C): 37.3 (18 Jan 2024 15:03), Max: 37.3 (18 Jan 2024 15:03)  T(F): 99.1 (18 Jan 2024 15:03), Max: 99.1 (18 Jan 2024 15:03)  HR: 89 (18 Jan 2024 15:03) (67 - 98)  BP: 129/80 (18 Jan 2024 15:03) (102/58 - 147/84)  BP(mean): --  RR: 18 (18 Jan 2024 15:03) (16 - 18)  SpO2: 98% (18 Jan 2024 15:03) (94% - 100%)    Parameters below as of 18 Jan 2024 15:03  Patient On (Oxygen Delivery Method): room air      CAPILLARY BLOOD GLUCOSE        I&O's Summary      PHYSICAL EXAM:  GENERAL: NAD  CHEST/LUNG: Clear to auscultation bilaterally; No wheeze  HEART: Regular rate and rhythm  ABDOMEN: Soft, Nontender, Nondistended  EXTREMITIES: no LE edema, no hand tremors  PSYCH: Calm  NEUROLOGY: AAOx3  SKIN: No rashes or lesions    LABS:                        13.5   5.33  )-----------( 147      ( 18 Jan 2024 05:00 )             39.1     01-18    138  |  101  |  6<L>  ----------------------------<  88  3.5   |  24  |  0.57    Ca    9.3      18 Jan 2024 05:00  Phos  3.7     01-18  Mg     1.80     01-18    TPro  7.3  /  Alb  3.6  /  TBili  0.8  /  DBili  x   /  AST  63<H>  /  ALT  32  /  AlkPhos  96  01-17    PT/INR - ( 17 Jan 2024 06:10 )   PT: 12.0 sec;   INR: 1.06 ratio         PTT - ( 17 Jan 2024 06:10 )  PTT:31.2 sec  CARDIAC MARKERS ( 18 Jan 2024 01:00 )  x     / x     / x     / x     / 2.1 ng/mL      Urinalysis Basic - ( 18 Jan 2024 05:00 )    Color: x / Appearance: x / SG: x / pH: x  Gluc: 88 mg/dL / Ketone: x  / Bili: x / Urobili: x   Blood: x / Protein: x / Nitrite: x   Leuk Esterase: x / RBC: x / WBC x   Sq Epi: x / Non Sq Epi: x / Bacteria: x        RADIOLOGY & ADDITIONAL TESTS:    Imaging Personally Reviewed:    Consultant(s) Notes Reviewed:      Care Discussed with Consultants/Other Providers:

## 2024-01-18 NOTE — DIETITIAN INITIAL EVALUATION ADULT - WEIGHT IN LBS
Heart Failure Care Pathway  GOALS TO BE MET BEFORE DISCHARGE:    1. Decrease congestion and/or edema with diuretic therapy to achieve near      optimal volume status.            Dyspnea improved:  Yes            Edema improved:     Yes still has 2+ bilat LATANYA        Net I/O and Weights since admission:          10/31 2300 - 11/30 2259  In: 5542.6 [P.O.:5475; I.V.:67.6]  Out: 70231 [Urine:31907]  Net: -26467.4            Vitals:    11/25/19 1646 11/25/19 2350 11/26/19 0538 11/27/19 0022   Weight: 103.4 kg (228 lb) 104 kg (229 lb 3.2 oz) 101.6 kg (224 lb) 99.5 kg (219 lb 6.4 oz)    11/27/19 0638 11/29/19 1025 11/30/19 0251   Weight: 98.2 kg (216 lb 8 oz) 95.9 kg (211 lb 6.7 oz) 95.2 kg (209 lb 14.1 oz)       2.  O2 sats > 92% on RA or at prior home O2 therapy level.          Current oxygenation status:       SpO2: (!) 89 %         O2 Device: None (Room air),  Oxygen Delivery: 3 LPM         Able to wean O2 this shift to keep sats > 92%:  No, please explain: remains SOB/HANEY        Does patient use Home O2? No    3.  Tolerates ambulation and mobility near baseline: No, please explain: SOB/HANEY and continues to need O2.        How many times did the patient ambulate with nursing staff this shift? 2    Please review the Heart Failure Care Pathway for additional HF goal outcomes.    Ebony Montero RN RN  11/30/2019   VSS. Tele: Afib. Tylenol given for right foot pain.1 mg bumex bolus given, good urine output. Purewick in place. Potassium replaced, recheck at 2000.      168

## 2024-01-18 NOTE — DIETITIAN INITIAL EVALUATION ADULT - PERTINENT LABORATORY DATA
01-18    138  |  101  |  6<L>  ----------------------------<  88  3.5   |  24  |  0.57    Ca    9.3      18 Jan 2024 05:00  Phos  3.7     01-18  Mg     1.80     01-18    TPro  7.3  /  Alb  3.6  /  TBili  0.8  /  DBili  x   /  AST  63<H>  /  ALT  32  /  AlkPhos  96  01-17  A1C with Estimated Average Glucose Result: 5.0 % (01-16-24 @ 05:53)

## 2024-01-19 LAB
ALBUMIN SERPL ELPH-MCNC: 4 G/DL — SIGNIFICANT CHANGE UP (ref 3.3–5)
ALP SERPL-CCNC: 113 U/L — SIGNIFICANT CHANGE UP (ref 40–120)
ALT FLD-CCNC: 31 U/L — SIGNIFICANT CHANGE UP (ref 4–41)
ANION GAP SERPL CALC-SCNC: 17 MMOL/L — HIGH (ref 7–14)
AST SERPL-CCNC: 51 U/L — HIGH (ref 4–40)
BILIRUB DIRECT SERPL-MCNC: <0.2 MG/DL — SIGNIFICANT CHANGE UP (ref 0–0.3)
BILIRUB INDIRECT FLD-MCNC: >0.1 MG/DL — SIGNIFICANT CHANGE UP (ref 0–1)
BILIRUB SERPL-MCNC: 0.3 MG/DL — SIGNIFICANT CHANGE UP (ref 0.2–1.2)
BUN SERPL-MCNC: 12 MG/DL — SIGNIFICANT CHANGE UP (ref 7–23)
CALCIUM SERPL-MCNC: 9.6 MG/DL — SIGNIFICANT CHANGE UP (ref 8.4–10.5)
CHLORIDE SERPL-SCNC: 101 MMOL/L — SIGNIFICANT CHANGE UP (ref 98–107)
CO2 SERPL-SCNC: 22 MMOL/L — SIGNIFICANT CHANGE UP (ref 22–31)
CREAT SERPL-MCNC: 0.68 MG/DL — SIGNIFICANT CHANGE UP (ref 0.5–1.3)
EGFR: 117 ML/MIN/1.73M2 — SIGNIFICANT CHANGE UP
FOLATE SERPL-MCNC: 17.3 NG/ML — SIGNIFICANT CHANGE UP (ref 3.1–17.5)
GLUCOSE SERPL-MCNC: 98 MG/DL — SIGNIFICANT CHANGE UP (ref 70–99)
HAV IGM SER-ACNC: SIGNIFICANT CHANGE UP
HBV CORE IGM SER-ACNC: SIGNIFICANT CHANGE UP
HBV SURFACE AG SER-ACNC: SIGNIFICANT CHANGE UP
HCT VFR BLD CALC: 40.1 % — SIGNIFICANT CHANGE UP (ref 39–50)
HCV AB S/CO SERPL IA: 0.45 S/CO — SIGNIFICANT CHANGE UP (ref 0–0.99)
HCV AB SERPL-IMP: SIGNIFICANT CHANGE UP
HGB BLD-MCNC: 13.9 G/DL — SIGNIFICANT CHANGE UP (ref 13–17)
MAGNESIUM SERPL-MCNC: 1.8 MG/DL — SIGNIFICANT CHANGE UP (ref 1.6–2.6)
MCHC RBC-ENTMCNC: 33.3 PG — SIGNIFICANT CHANGE UP (ref 27–34)
MCHC RBC-ENTMCNC: 34.7 GM/DL — SIGNIFICANT CHANGE UP (ref 32–36)
MCV RBC AUTO: 95.9 FL — SIGNIFICANT CHANGE UP (ref 80–100)
NRBC # BLD: 0 /100 WBCS — SIGNIFICANT CHANGE UP (ref 0–0)
NRBC # FLD: 0 K/UL — SIGNIFICANT CHANGE UP (ref 0–0)
PHOSPHATE SERPL-MCNC: 4.4 MG/DL — SIGNIFICANT CHANGE UP (ref 2.5–4.5)
PLATELET # BLD AUTO: 139 K/UL — LOW (ref 150–400)
POTASSIUM SERPL-MCNC: 3.3 MMOL/L — LOW (ref 3.5–5.3)
POTASSIUM SERPL-SCNC: 3.3 MMOL/L — LOW (ref 3.5–5.3)
PROT SERPL-MCNC: 8.3 G/DL — SIGNIFICANT CHANGE UP (ref 6–8.3)
RBC # BLD: 4.18 M/UL — LOW (ref 4.2–5.8)
RBC # FLD: 13 % — SIGNIFICANT CHANGE UP (ref 10.3–14.5)
SODIUM SERPL-SCNC: 140 MMOL/L — SIGNIFICANT CHANGE UP (ref 135–145)
VIT B12 SERPL-MCNC: 742 PG/ML — SIGNIFICANT CHANGE UP (ref 200–900)
WBC # BLD: 5.66 K/UL — SIGNIFICANT CHANGE UP (ref 3.8–10.5)
WBC # FLD AUTO: 5.66 K/UL — SIGNIFICANT CHANGE UP (ref 3.8–10.5)

## 2024-01-19 PROCEDURE — 93306 TTE W/DOPPLER COMPLETE: CPT | Mod: 26

## 2024-01-19 PROCEDURE — 99233 SBSQ HOSP IP/OBS HIGH 50: CPT

## 2024-01-19 RX ORDER — POTASSIUM CHLORIDE 20 MEQ
40 PACKET (EA) ORAL EVERY 4 HOURS
Refills: 0 | Status: COMPLETED | OUTPATIENT
Start: 2024-01-19 | End: 2024-01-19

## 2024-01-19 RX ADMIN — Medication 0.5 MILLIGRAM(S): at 09:54

## 2024-01-19 RX ADMIN — GABAPENTIN 100 MILLIGRAM(S): 400 CAPSULE ORAL at 05:36

## 2024-01-19 RX ADMIN — Medication 0.5 MILLIGRAM(S): at 02:04

## 2024-01-19 RX ADMIN — Medication 40 MILLIEQUIVALENT(S): at 09:55

## 2024-01-19 RX ADMIN — Medication 1 PATCH: at 12:41

## 2024-01-19 RX ADMIN — Medication 1 MILLIGRAM(S): at 12:41

## 2024-01-19 RX ADMIN — GABAPENTIN 100 MILLIGRAM(S): 400 CAPSULE ORAL at 22:39

## 2024-01-19 RX ADMIN — GABAPENTIN 100 MILLIGRAM(S): 400 CAPSULE ORAL at 14:27

## 2024-01-19 RX ADMIN — Medication 81 MILLIGRAM(S): at 12:41

## 2024-01-19 RX ADMIN — Medication 650 MILLIGRAM(S): at 22:40

## 2024-01-19 RX ADMIN — Medication 1 PATCH: at 12:40

## 2024-01-19 RX ADMIN — CHLORHEXIDINE GLUCONATE 1 APPLICATION(S): 213 SOLUTION TOPICAL at 12:45

## 2024-01-19 RX ADMIN — Medication 1 TABLET(S): at 12:41

## 2024-01-19 RX ADMIN — Medication 0.5 MILLIGRAM(S): at 14:33

## 2024-01-19 RX ADMIN — Medication 40 MILLIEQUIVALENT(S): at 14:27

## 2024-01-19 RX ADMIN — Medication 0.5 MILLIGRAM(S): at 05:36

## 2024-01-19 NOTE — DISCHARGE NOTE PROVIDER - PROVIDER TOKENS
FREE:[LAST:[Please follow up],FIRST:[with medicine doctor],PHONE:[(   )    -],FAX:[(   )    -],ADDRESS:[from clinic - call for appointment]]

## 2024-01-19 NOTE — DISCHARGE NOTE PROVIDER - HOSPITAL COURSE
43M with history of ETOH abuse (drinks 15-20 drinks daily), prior history of alcohol withdrawal who presents with alcohol withdrawal and chest pain.  Treated with ativan taper,   Echo showed----------------------------     43M with history of ETOH abuse (drinks 15-20 drinks daily), prior history of alcohol withdrawal who presents with alcohol withdrawal and chest pain, s/p Ativan taper.    #Etoh withdrawal  - s/p ativan taper  - symptomatically improved  - seizure precautions   - fall risk precautions  - multivitamin, folate, and thiamine  - encouraged etoh cessation.  -dc to substance rehab    #Sinus tachycardia: likely 2/2 acute alcohol withdrawal  Improved    #Atypical chest pain: Resolved. Troponin negative x3 - TTE: left ventricular systolic function is normal with an ejection fraction of 54%  Can consider outpatient follow up if recurs or if further suspicion for cardiac disease.    #Chronic neuropathic LE discomfort, suspect in setting of chronic long standing etoh abuse. B12 not low. Uptitrated gabapentin to 200 TID. 43M with history of ETOH abuse (drinks 15-20 drinks daily), prior history of alcohol withdrawal who presents with alcohol withdrawal and chest pain, s/p Ativan taper.    #Etoh withdrawal  - s/p ativan taper  - symptomatically improved  - seizure precautions   - fall risk precautions  - multivitamin, folate, and thiamine  - encouraged etoh cessation.  -dc to substance rehab    #Sinus tachycardia: likely 2/2 acute alcohol withdrawal  Improved    #Atypical chest pain: Resolved. Troponin negative x3 - TTE: left ventricular systolic function is normal with an ejection fraction of 54%  Can consider outpatient follow up if recurs or if further suspicion for cardiac disease.    #Chronic neuropathic LE discomfort, suspect in setting of chronic long standing etoh abuse. B12 not low. Uptitrated gabapentin to 200 TID and trialed lido patch 43M with history of ETOH abuse (drinks 15-20 drinks daily), prior history of alcohol withdrawal who presents with alcohol withdrawal and chest pain, s/p Ativan taper.    Pt with significant alcohol abuse history, drinks 15-20 drinks per day, presented intoxicated and now in alcohol withdrawal. Has history of withdrawal but none previously at our hospital.  - s/p ativan taper  - symptomatically improved  - multivitamin, folate, and thiamine  - encourage ETOH cessation.  - refused substance rehab     Neuropathy.   ·  Plan: Chronic neuropathic LE discomfort, suspect in setting of chronic long standing ETOH abuse.   - B12 nl  - c/w gabapentin to 600 TID, patient reports positive response  - standing lidocaine patches as patient reports positive response  - patient reevlauted by PT and patient able to ambulate 150ft, so no longer needs ELVIS. SW to discuss shelter options with patient  - patient medically stable for discharge.    Folliculitis.   ·  Plan: 2/2 patient complained of L buttock pain. skin exam with small < 1cm x 1cm area of induration without fluctuance or erythema in the gluteal fold on the L buttock  - no area of fluctuance that would be amenable to drainage.    Sinus tachycardia.   ·  Plan: Sinus tachycardia likely 2/2 acute alcohol withdrawal, however pt also complaining of intermittent chest pain  - s/p IVF   - urine tox positive for THC and Benzos  - improved.    Atypical chest pain.   ·  Plan: resolved  - Troponin negative x3  - TTE: left ventricular systolic function is normal with an ejection fraction of 54%  - Would not perform stress test while actively withdrawing. Also pain resolved. Low suspicion for cardiac at this time. Can consider outpatient eval.    Transaminitis.   ·  Plan: Elevated in setting of alcohol use  - Improving  - Hepatitis panel negative.   43M with history of ETOH abuse (drinks 15-20 drinks daily), prior history of alcohol withdrawal who presents with alcohol withdrawal and chest pain, s/p Ativan taper.    Pt with significant alcohol abuse history, drinks 15-20 drinks per day, presented intoxicated and now in alcohol withdrawal. Has history of withdrawal but none previously at our hospital.  - s/p ativan taper  - symptomatically improved  - multivitamin, folate, and thiamine  - encourage ETOH cessation.  - refused substance rehab     Neuropathy.   ·  Plan: Chronic neuropathic LE discomfort, suspect in setting of chronic long standing ETOH abuse.   - B12 nl  - c/w gabapentin to 600 TID, patient reports positive response  - standing lidocaine patches as patient reports positive response  - patient reevlauted by PT and patient able to ambulate 150ft, so no longer needs ELVIS. SW to discuss shelter options with patient  - patient medically stable for discharge.    Folliculitis.   ·  Plan: 2/2 patient complained of L buttock pain. skin exam with small < 1cm x 1cm area of induration without fluctuance or erythema in the gluteal fold on the L buttock  - no area of fluctuance that would be amenable to drainage.    Sinus tachycardia.   ·  Plan: Sinus tachycardia likely 2/2 acute alcohol withdrawal, however pt also complaining of intermittent chest pain  - s/p IVF   - urine tox positive for THC and Benzos  - improved.    Atypical chest pain.   ·  Plan: resolved  - Troponin negative x3  - TTE: left ventricular systolic function is normal with an ejection fraction of 54%  - Would not perform stress test while actively withdrawing. Also pain resolved. Low suspicion for cardiac at this time. Can consider outpatient eval.    Transaminitis.   ·  Plan: Elevated in setting of alcohol use  - Improving  - Hepatitis panel negative.    On ___ this case was reviewed with  ____, the patient is medically stable and optimized for discharge. All medications were reviewed and prescriptions were sent to mutually agreed upon pharmacy.   43M with history of ETOH abuse (drinks 15-20 drinks daily), prior history of alcohol withdrawal who presents with alcohol withdrawal and chest pain, s/p Ativan taper.    Pt with significant alcohol abuse history, drinks 15-20 drinks per day, presented intoxicated and now in alcohol withdrawal. Has history of withdrawal but none previously at our hospital.  - s/p ativan taper  - symptomatically improved  - multivitamin, folate, and thiamine  - encourage ETOH cessation.  - refused substance rehab     Neuropathy.   ·  Plan: Chronic neuropathic LE discomfort, suspect in setting of chronic long standing ETOH abuse.   - B12 nl  - c/w gabapentin to 600 TID, patient reports positive response  - standing lidocaine patches as patient reports positive response  - patient reevlauted by PT and patient able to ambulate 150ft, so no longer needs ELVIS. SW to discuss shelter options with patient  - patient medically stable for discharge.    Folliculitis.   ·  Plan: 2/2 patient complained of L buttock pain. skin exam with small < 1cm x 1cm area of induration without fluctuance or erythema in the gluteal fold on the L buttock  - no area of fluctuance that would be amenable to drainage.    Sinus tachycardia.   ·  Plan: Sinus tachycardia likely 2/2 acute alcohol withdrawal, however pt also complaining of intermittent chest pain  - s/p IVF   - urine tox positive for THC and Benzos  - improved.    Atypical chest pain.   ·  Plan: resolved  - Troponin negative x3  - TTE: left ventricular systolic function is normal with an ejection fraction of 54%  - Would not perform stress test while actively withdrawing. Also pain resolved. Low suspicion f or cardiac at this time. Can consider outpatient eval.    Transaminitis.   ·  Plan: Elevated in setting of alcohol use  - Improving  - Hepatitis panel negative.    On this case was reviewed with  , the patient is medically stable and optimized for dis charg  e. All medications were reviewed and prescriptions were sent to mutually agreed upon pharmacy.   43M with history of ETOH abuse (drinks 15-20 drinks daily), prior history of alcohol withdrawal who presents with alcohol withdrawal and chest pain, s/p Ativan taper.    Pt with significant alcohol abuse history, drinks 15-20 drinks per day, presented intoxicated and now in alcohol withdrawal. Has history of withdrawal but none previously at our hospital.  - s/p ativan taper  - symptomatically improved  - multivitamin, folate, and thiamine  - encourage ETOH cessation.  - refused substance rehab     Neuropathy.   · Chronic neuropathic LE discomfort, suspect in setting of chronic long standing ETOH abuse.   - B12 nl  - c/w gabapentin to 600 bid and 800 mg QHS  patient reports positive response  - standing lidocaine patches as patient reports positive response  - patient reevlauted by PT and patient able to ambulate 150ft, so no longer needs ELVIS.   - patient medically stable for discharge.    Folliculitis.    2/2 patient complained of L buttock pain. skin exam with small < 1cm x 1cm area of induration without fluctuance or erythema in the gluteal fold on the L buttock  - no area of fluctuance that would be amenable to drainage.    Sinus tachycardia.    Sinus tachycardia likely 2/2 acute alcohol withdrawal, however pt also complaining of intermittent chest pain  - s/p IVF   - urine tox positive for THC and Benzos  - improved.    Atypical chest pain.    resolved  - Troponin negative x3  - TTE: left ventricular systolic function is normal with an ejection fraction of 54%  - Would not perform stress test while actively withdrawing. Also pain resolved. Low suspicion f or cardiac at this time. Can consider outpatient eval.    Transaminitis.   : Elevated in setting of alcohol use  - Improving  - Hepatitis panel negative.  Patient hemodynamically stable for discharge home  Time spent in discharge process is 32 min  pt plan to inpatient alcohol  rehab         On this case was reviewed with Dr. Kerr the patient is medically stable and optimized for dis charg  e. All medications were reviewed and prescriptions were sent to mutually agreed upon pharmacy.   43M with history of ETOH abuse (drinks 15-20 drinks daily), prior history of alcohol withdrawal who presents with alcohol withdrawal and chest pain, s/p Ativan taper.    Pt with significant alcohol abuse history, drinks 15-20 drinks per day, presented intoxicated and now in alcohol withdrawal. Has history of withdrawal but none previously at our hospital.  - s/p ativan taper  - symptomatically improved  - multivitamin, folate, and thiamine  - encourage ETOH cessation.  - refused substance rehab     Neuropathy.   · Chronic neuropathic LE discomfort, suspect in setting of chronic long standing ETOH abuse.   - B12 nl  - c/w gabapentin to 600 bid and 800 mg QHS  patient reports positive response    - standing lidocaine patches as patient reports positive response  - patient reevlauted by PT and patient able to ambulate 150ft, so no longer needs ELVIS.   - patient medically stable for discharge.    Folliculitis.    2/2 patient complained of L buttock pain. skin exam with small < 1cm x 1cm area of induration without fluctuance or erythema in the gluteal fold on the L buttock  - no area of fluctuance that would be amenable to drainage.    Sinus tachycardia.    Sinus tachycardia likely 2/2 acute alcohol withdrawal, however pt also complaining of intermittent chest pain  - s/p IVF   - urine tox positive for THC and Benzos  - improved.    Atypical chest pain.    resolved  - Troponin negative x3  - TTE: left ventricular systolic function is normal with an ejection fraction of 54%  - Would not perform stress test while actively withdrawing. Also pain resolved. Low suspicion f or cardiac at this time. Can consider outpatient eval.    Transaminitis.   : Elevated in setting of alcohol use  - Improving  - Hepatitis panel negative.  Patient hemodynamically stable for discharge home  Time spent in discharge process is 32 min  pt plan to inpatient alcohol  rehab         On this case was reviewed with Dr. Kerr the patient is medically stable and optimized for discharge  e. All medications were reviewed and prescriptions were sent to mutually agreed upon pharmacy.   43M with history of ETOH abuse (drinks 15-20 drinks daily), prior history of alcohol withdrawal who presents with alcohol withdrawal and chest pain, s/p Ativan taper.    Pt with significant alcohol abuse history, drinks 15-20 drinks per day, presented intoxicated and now in alcohol withdrawal. Has history of withdrawal but none previously at our hospital.  - s/p ativan taper  - symptomatically improved  - multivitamin, folate, and thiamine  - encourage ETOH cessation.  - refused substance rehab     Neuropathy.   · Chronic neuropathic LE discomfort, suspect in setting of chronic long standing ETOH abuse.   - B12 nl  - c/w gabapentin to 600 bid and 800 mg QHS  patient reports positive response    - standing lidocaine patches as patient reports positive response  - patient reevlauted by PT and patient able to ambulate 150ft, so no longer needs ELVIS.   - patient medically stable for discharge.    Folliculitis.    2/2 patient complained of L buttock pain. skin exam with small < 1cm x 1cm area of induration without fluctuance or erythema in the gluteal fold on the L buttock  - no area of fluctuance that would be amenable to drainage.    Sinus tachycardia.    Sinus tachycardia likely 2/2 acute alcohol withdrawal, however pt also complaining of intermittent chest pain  - s/p IVF   - urine tox positive for THC and Benzos  - improved.    Atypical chest pain.    resolved  - Troponin negative x3  - TTE: left ventricular systolic function is normal with an ejection fraction of 54%  - Would not perform stress test while actively withdrawing. Also pain resolved. Low suspicion f or cardiac at this time. Can consider outpatient eval.    Transaminitis.   : Elevated in setting of alcohol use  - Improving  - Hepatitis panel negative.  Patient hemodynamically stable for discharge home  Time spent in discharge process is 32 min  pt plan to inpatient alcohol  rehab   . PT initially recommending rehab. But patient able to ambulate over 150ft, so no longer needs rehab. Patient not interested in shelter, given a list of substance abuse rehabs, given locations to .   patient stable for discharge        On 2/14/2024 this case was reviewed with Dr. Kerr the patient is medically stable and optimized for discharge  e. All medications were reviewed and prescriptions were sent to mutually agreed upon pharmacy.

## 2024-01-19 NOTE — DISCHARGE NOTE PROVIDER - CARE PROVIDER_API CALL
Please follow up, with medicine doctor  from clinic - call for appointment  Phone: (   )    -  Fax: (   )    -  Follow Up Time:

## 2024-01-19 NOTE — DISCHARGE NOTE PROVIDER - NSDCCPCAREPLAN_GEN_ALL_CORE_FT
PRINCIPAL DISCHARGE DIAGNOSIS  Diagnosis: Alcohol withdrawal  Assessment and Plan of Treatment: you were treated with ativan, stop drinking alcohol, f/u with pcp      SECONDARY DISCHARGE DIAGNOSES  Diagnosis: Atypical chest pain  Assessment and Plan of Treatment: you had echo cardiogram which showed -----     PRINCIPAL DISCHARGE DIAGNOSIS  Diagnosis: Alcohol withdrawal  Assessment and Plan of Treatment: You were monitored for withdrawal and treated with ativan. Please stop drinking alcohol and follow up with your outpatient doctors.      SECONDARY DISCHARGE DIAGNOSES  Diagnosis: Atypical chest pain  Assessment and Plan of Treatment: You had chest pain which resolved and had bloodwork performed which did not suggest cardiac disease, and an echocardiogram which was unremarkable. Please seek medical care if chest pain recurs.     PRINCIPAL DISCHARGE DIAGNOSIS  Diagnosis: Alcohol withdrawal  Assessment and Plan of Treatment: You were monitored for withdrawal and treated with ativan. Please stop drinking alcohol and follow up with your outpatient doctors.      SECONDARY DISCHARGE DIAGNOSES  Diagnosis: Atypical chest pain  Assessment and Plan of Treatment: You had chest pain which resolved and had bloodwork performed which did not suggest cardiac disease, and an echocardiogram which was unremarkable. Please seek medical care if chest pain recurs.    Diagnosis: Neuropathy  Assessment and Plan of Treatment: continue medications as directed, avoid Alcohol use , Follow up with your PMD

## 2024-01-19 NOTE — DISCHARGE NOTE PROVIDER - ATTENDING DISCHARGE PHYSICAL EXAMINATION:
Patient medically stable for discharge. Able to ambulate 150 ft no need for ELVIS.  Patient medically stable for discharge. Able to ambulate 150 ft no need for ELVIS.     T(C): 36.7 (02-09-24 @ 10:08), Max: 37.1 (02-08-24 @ 21:09)  HR: 78 (02-09-24 @ 10:08) (78 - 95)  BP: 108/69 (02-09-24 @ 10:08) (98/67 - 122/86)  RR: 18 (02-09-24 @ 10:08) (18 - 19)  SpO2: 100% (02-09-24 @ 10:08) (98% - 100%)    CONSTITUTIONAL: Well groomed, no apparent distress  RESP: No respiratory distress, no use of accessory muscles; CTA b/l, no WRR  CV: RRR, +S1S2, no MRG; no JVD; no peripheral edema  GI: Soft, NT, ND, no rebound, no guarding  MSK: Normal ROM without pain, no spinal tenderness, normal muscle strength/tone  SKIN: No rashes or ulcers noted; no subcutaneous nodules or induration palpable  NEURO: sensation intact in upper and lower extremities b/l to light touch   PSYCH: Appropriate insight/judgment; A+O x 3, mood and affect appropriate, recent/remote memory intact

## 2024-01-19 NOTE — PROGRESS NOTE ADULT - PROBLEM SELECTOR PLAN 2
Sinus tachycardia likely 2/2 acute alcohol withdrawal, however pt also complaining of intermittent chest pain  - s/p IVF   - check TTE- f/u results  - urine tox positive for THC and Benzos  - continue tele monitoring

## 2024-01-19 NOTE — PROGRESS NOTE ADULT - SUBJECTIVE AND OBJECTIVE BOX
Patient is a 43y old  Male who presents with a chief complaint of Chest pain, alcohol withdrawal (18 Jan 2024 15:41)      SUBJECTIVE / OVERNIGHT EVENTS: Pt seen and examined at 11:17am, no overnight events, pt has no chest pain today, has some pain in his feet, no other new issues reported.      MEDICATIONS  (STANDING):  aspirin  chewable 81 milliGRAM(s) Oral daily  chlorhexidine 2% Cloths 1 Application(s) Topical daily  folic acid 1 milliGRAM(s) Oral daily  gabapentin 100 milliGRAM(s) Oral three times a day  influenza   Vaccine 0.5 milliLiter(s) IntraMuscular once  LORazepam   Injectable 0.5 milliGRAM(s) IV Push every 12 hours  LORazepam   Injectable   IV Push   multivitamin 1 Tablet(s) Oral daily  nicotine - 21 mG/24Hr(s) Patch 1 Patch Transdermal daily    MEDICATIONS  (PRN):  acetaminophen     Tablet .. 650 milliGRAM(s) Oral every 6 hours PRN Temp greater or equal to 38C (100.4F), Mild Pain (1 - 3)  aluminum hydroxide/magnesium hydroxide/simethicone Suspension 30 milliLiter(s) Oral every 4 hours PRN Dyspepsia  LORazepam   Injectable 2 milliGRAM(s) IV Push every 2 hours PRN CIWA-Ar score increase by 2 points and a total score of 7 or less  LORazepam   Injectable 2 milliGRAM(s) IV Push every 1 hour PRN CIWA-Ar score 8 or greater  melatonin 3 milliGRAM(s) Oral at bedtime PRN Insomnia  ondansetron Injectable 4 milliGRAM(s) IV Push every 8 hours PRN Nausea and/or Vomiting      Vital Signs Last 24 Hrs  T(C): 36.2 (19 Jan 2024 14:00), Max: 37.3 (18 Jan 2024 15:03)  T(F): 97.1 (19 Jan 2024 14:00), Max: 99.1 (18 Jan 2024 15:03)  HR: 102 (19 Jan 2024 14:00) (78 - 102)  BP: 117/79 (19 Jan 2024 14:00) (115/77 - 132/90)  BP(mean): --  RR: 16 (19 Jan 2024 14:00) (14 - 18)  SpO2: 100% (19 Jan 2024 14:00) (98% - 100%)    Parameters below as of 19 Jan 2024 14:00  Patient On (Oxygen Delivery Method): room air      CAPILLARY BLOOD GLUCOSE        I&O's Summary      PHYSICAL EXAM:  GENERAL: NAD  CHEST/LUNG: Clear to auscultation bilaterally; No wheeze  HEART: Regular rate and rhythm  ABDOMEN: Soft, Nontender, Nondistended  EXTREMITIES: no LE edema, no hand tremors  PSYCH: Calm  NEUROLOGY: AAOx3  SKIN: No rashes or lesions      LABS:                        13.9   5.66  )-----------( 139      ( 19 Jan 2024 04:10 )             40.1     01-19    140  |  101  |  12  ----------------------------<  98  3.3<L>   |  22  |  0.68    Ca    9.6      19 Jan 2024 04:10  Phos  4.4     01-19  Mg     1.80     01-19    TPro  8.3  /  Alb  4.0  /  TBili  0.3  /  DBili  <0.2  /  AST  51<H>  /  ALT  31  /  AlkPhos  113  01-19      CARDIAC MARKERS ( 18 Jan 2024 01:00 )  x     / x     / x     / x     / 2.1 ng/mL      Urinalysis Basic - ( 19 Jan 2024 04:10 )    Color: x / Appearance: x / SG: x / pH: x  Gluc: 98 mg/dL / Ketone: x  / Bili: x / Urobili: x   Blood: x / Protein: x / Nitrite: x   Leuk Esterase: x / RBC: x / WBC x   Sq Epi: x / Non Sq Epi: x / Bacteria: x        RADIOLOGY & ADDITIONAL TESTS:    Imaging Personally Reviewed:    Consultant(s) Notes Reviewed:      Care Discussed with Consultants/Other Providers:

## 2024-01-19 NOTE — DISCHARGE NOTE PROVIDER - NSFOLLOWUPCLINICS_GEN_ALL_ED_FT
[facility] [place]  Internal Medicine  [address] [place] [address]  Phone: [phone]  Fax: [phone]

## 2024-01-20 LAB
ANION GAP SERPL CALC-SCNC: 13 MMOL/L — SIGNIFICANT CHANGE UP (ref 7–14)
BUN SERPL-MCNC: 14 MG/DL — SIGNIFICANT CHANGE UP (ref 7–23)
CALCIUM SERPL-MCNC: 9.7 MG/DL — SIGNIFICANT CHANGE UP (ref 8.4–10.5)
CHLORIDE SERPL-SCNC: 104 MMOL/L — SIGNIFICANT CHANGE UP (ref 98–107)
CO2 SERPL-SCNC: 22 MMOL/L — SIGNIFICANT CHANGE UP (ref 22–31)
CREAT SERPL-MCNC: 0.63 MG/DL — SIGNIFICANT CHANGE UP (ref 0.5–1.3)
EGFR: 120 ML/MIN/1.73M2 — SIGNIFICANT CHANGE UP
GLUCOSE SERPL-MCNC: 91 MG/DL — SIGNIFICANT CHANGE UP (ref 70–99)
HCT VFR BLD CALC: 38.8 % — LOW (ref 39–50)
HGB BLD-MCNC: 13.1 G/DL — SIGNIFICANT CHANGE UP (ref 13–17)
MAGNESIUM SERPL-MCNC: 1.9 MG/DL — SIGNIFICANT CHANGE UP (ref 1.6–2.6)
MCHC RBC-ENTMCNC: 33.2 PG — SIGNIFICANT CHANGE UP (ref 27–34)
MCHC RBC-ENTMCNC: 33.8 GM/DL — SIGNIFICANT CHANGE UP (ref 32–36)
MCV RBC AUTO: 98.2 FL — SIGNIFICANT CHANGE UP (ref 80–100)
NRBC # BLD: 0 /100 WBCS — SIGNIFICANT CHANGE UP (ref 0–0)
NRBC # FLD: 0 K/UL — SIGNIFICANT CHANGE UP (ref 0–0)
PHOSPHATE SERPL-MCNC: 4.3 MG/DL — SIGNIFICANT CHANGE UP (ref 2.5–4.5)
PLATELET # BLD AUTO: 156 K/UL — SIGNIFICANT CHANGE UP (ref 150–400)
POTASSIUM SERPL-MCNC: 3.9 MMOL/L — SIGNIFICANT CHANGE UP (ref 3.5–5.3)
POTASSIUM SERPL-SCNC: 3.9 MMOL/L — SIGNIFICANT CHANGE UP (ref 3.5–5.3)
RBC # BLD: 3.95 M/UL — LOW (ref 4.2–5.8)
RBC # FLD: 12.9 % — SIGNIFICANT CHANGE UP (ref 10.3–14.5)
SODIUM SERPL-SCNC: 139 MMOL/L — SIGNIFICANT CHANGE UP (ref 135–145)
WBC # BLD: 5.42 K/UL — SIGNIFICANT CHANGE UP (ref 3.8–10.5)
WBC # FLD AUTO: 5.42 K/UL — SIGNIFICANT CHANGE UP (ref 3.8–10.5)

## 2024-01-20 PROCEDURE — 99232 SBSQ HOSP IP/OBS MODERATE 35: CPT

## 2024-01-20 RX ADMIN — Medication 1 MILLIGRAM(S): at 12:10

## 2024-01-20 RX ADMIN — Medication 0.5 MILLIGRAM(S): at 18:44

## 2024-01-20 RX ADMIN — Medication 1 PATCH: at 18:47

## 2024-01-20 RX ADMIN — GABAPENTIN 100 MILLIGRAM(S): 400 CAPSULE ORAL at 13:37

## 2024-01-20 RX ADMIN — Medication 0.5 MILLIGRAM(S): at 06:47

## 2024-01-20 RX ADMIN — GABAPENTIN 100 MILLIGRAM(S): 400 CAPSULE ORAL at 21:53

## 2024-01-20 RX ADMIN — GABAPENTIN 100 MILLIGRAM(S): 400 CAPSULE ORAL at 06:45

## 2024-01-20 RX ADMIN — Medication 650 MILLIGRAM(S): at 21:53

## 2024-01-20 RX ADMIN — CHLORHEXIDINE GLUCONATE 1 APPLICATION(S): 213 SOLUTION TOPICAL at 12:07

## 2024-01-20 RX ADMIN — Medication 1 TABLET(S): at 12:11

## 2024-01-20 RX ADMIN — Medication 1 PATCH: at 12:08

## 2024-01-20 NOTE — PROGRESS NOTE ADULT - SUBJECTIVE AND OBJECTIVE BOX
Patient is a 43y old  Male who presents with a chief complaint of Chest pain, alcohol withdrawal (19 Jan 2024 15:53)      INTERVAL HPI/OVERNIGHT EVENTS: JOHN overnight. This morning, no acute complaints.       REVIEW OF SYSTEMS:    CONSTITUTIONAL: No weakness, fevers or chills  EYES/ENT: No visual changes;  No vertigo or throat pain   NECK: No pain or stiffness  RESPIRATORY: No cough, wheezing, hemoptysis; No shortness of breath  CARDIOVASCULAR: No chest pain or palpitations  GASTROINTESTINAL: No abdominal or epigastric pain. No nausea, vomiting, or hematemesis; No diarrhea or constipation. No melena or hematochezia.  GENITOURINARY: No dysuria, frequency or hematuria  NEUROLOGICAL: No numbness or weakness  All other review of systems is negative unless indicated above.    FAMILY HISTORY:    T(C): 36.5 (01-20-24 @ 06:40), Max: 37.2 (01-19-24 @ 22:20)  HR: 68 (01-20-24 @ 06:40) (68 - 102)  BP: 106/73 (01-20-24 @ 06:40) (106/73 - 119/80)  RR: 18 (01-20-24 @ 06:40) (14 - 18)  SpO2: 100% (01-20-24 @ 06:40) (99% - 100%)  Wt(kg): --Vital Signs Last 24 Hrs  T(C): 36.5 (20 Jan 2024 06:40), Max: 37.2 (19 Jan 2024 22:20)  T(F): 97.7 (20 Jan 2024 06:40), Max: 99 (19 Jan 2024 22:20)  HR: 68 (20 Jan 2024 06:40) (68 - 102)  BP: 106/73 (20 Jan 2024 06:40) (106/73 - 119/80)  BP(mean): --  RR: 18 (20 Jan 2024 06:40) (14 - 18)  SpO2: 100% (20 Jan 2024 06:40) (99% - 100%)    Parameters below as of 20 Jan 2024 06:40  Patient On (Oxygen Delivery Method): room air        PHYSICAL EXAM:  GENERAL: NAD  CHEST/LUNG: Clear to auscultation bilaterally; No wheeze  HEART: Regular rate and rhythm  ABDOMEN: Soft, Nontender, Nondistended  EXTREMITIES: no LE edema, no hand tremors  PSYCH: Calm  NEUROLOGY: AAOx3  SKIN: No rashes or lesions, tattoos over large amount of BSA     Consultant(s) Notes Reviewed:  [x ] YES  [ ] NO  Care Discussed with Consultants/Other Providers [ x] YES  [ ] NO    LABS:                        13.1   5.42  )-----------( 156      ( 20 Jan 2024 06:30 )             38.8     20 Jan 2024 06:30    139    |  104    |  14     ----------------------------<  91     3.9     |  22     |  0.63     Ca    9.7        20 Jan 2024 06:30  Phos  4.3       20 Jan 2024 06:30  Mg     1.90      20 Jan 2024 06:30        CAPILLARY BLOOD GLUCOSE        BLOOD CULTURE      RADIOLOGY & ADDITIONAL TESTS:    Imaging Personally Reviewed:  [ ] YES  [ ] NO  acetaminophen     Tablet .. 650 milliGRAM(s) Oral every 6 hours PRN  aluminum hydroxide/magnesium hydroxide/simethicone Suspension 30 milliLiter(s) Oral every 4 hours PRN  aspirin  chewable 81 milliGRAM(s) Oral daily  chlorhexidine 2% Cloths 1 Application(s) Topical daily  folic acid 1 milliGRAM(s) Oral daily  gabapentin 100 milliGRAM(s) Oral three times a day  influenza   Vaccine 0.5 milliLiter(s) IntraMuscular once  LORazepam   Injectable 0.5 milliGRAM(s) IV Push every 12 hours  LORazepam   Injectable 2 milliGRAM(s) IV Push every 1 hour PRN  LORazepam   Injectable   IV Push   LORazepam   Injectable 2 milliGRAM(s) IV Push every 2 hours PRN  melatonin 3 milliGRAM(s) Oral at bedtime PRN  multivitamin 1 Tablet(s) Oral daily  nicotine - 21 mG/24Hr(s) Patch 1 Patch Transdermal daily  ondansetron Injectable 4 milliGRAM(s) IV Push every 8 hours PRN      HEALTH ISSUES - PROBLEM Dx:  Alcohol dependence with withdrawal    Atypical chest pain    Need for prophylactic measure    Transaminitis    Sinus tachycardia

## 2024-01-20 NOTE — PROGRESS NOTE ADULT - PROBLEM SELECTOR PLAN 2
Sinus tachycardia likely 2/2 acute alcohol withdrawal, however pt also complaining of intermittent chest pain  - s/p IVF   - urine tox positive for THC and Benzos  - continue tele monitoring

## 2024-01-21 LAB
ANION GAP SERPL CALC-SCNC: 12 MMOL/L — SIGNIFICANT CHANGE UP (ref 7–14)
BUN SERPL-MCNC: 15 MG/DL — SIGNIFICANT CHANGE UP (ref 7–23)
CALCIUM SERPL-MCNC: 9.9 MG/DL — SIGNIFICANT CHANGE UP (ref 8.4–10.5)
CHLORIDE SERPL-SCNC: 104 MMOL/L — SIGNIFICANT CHANGE UP (ref 98–107)
CO2 SERPL-SCNC: 25 MMOL/L — SIGNIFICANT CHANGE UP (ref 22–31)
CREAT SERPL-MCNC: 0.77 MG/DL — SIGNIFICANT CHANGE UP (ref 0.5–1.3)
EGFR: 113 ML/MIN/1.73M2 — SIGNIFICANT CHANGE UP
GLUCOSE SERPL-MCNC: 87 MG/DL — SIGNIFICANT CHANGE UP (ref 70–99)
HCT VFR BLD CALC: 41.3 % — SIGNIFICANT CHANGE UP (ref 39–50)
HGB BLD-MCNC: 13.6 G/DL — SIGNIFICANT CHANGE UP (ref 13–17)
MAGNESIUM SERPL-MCNC: 1.9 MG/DL — SIGNIFICANT CHANGE UP (ref 1.6–2.6)
MCHC RBC-ENTMCNC: 32.6 PG — SIGNIFICANT CHANGE UP (ref 27–34)
MCHC RBC-ENTMCNC: 32.9 GM/DL — SIGNIFICANT CHANGE UP (ref 32–36)
MCV RBC AUTO: 99 FL — SIGNIFICANT CHANGE UP (ref 80–100)
NRBC # BLD: 0 /100 WBCS — SIGNIFICANT CHANGE UP (ref 0–0)
NRBC # FLD: 0 K/UL — SIGNIFICANT CHANGE UP (ref 0–0)
PHOSPHATE SERPL-MCNC: 5.2 MG/DL — HIGH (ref 2.5–4.5)
PLATELET # BLD AUTO: 184 K/UL — SIGNIFICANT CHANGE UP (ref 150–400)
POTASSIUM SERPL-MCNC: 4.1 MMOL/L — SIGNIFICANT CHANGE UP (ref 3.5–5.3)
POTASSIUM SERPL-SCNC: 4.1 MMOL/L — SIGNIFICANT CHANGE UP (ref 3.5–5.3)
RBC # BLD: 4.17 M/UL — LOW (ref 4.2–5.8)
RBC # FLD: 13.1 % — SIGNIFICANT CHANGE UP (ref 10.3–14.5)
SODIUM SERPL-SCNC: 141 MMOL/L — SIGNIFICANT CHANGE UP (ref 135–145)
WBC # BLD: 6.06 K/UL — SIGNIFICANT CHANGE UP (ref 3.8–10.5)
WBC # FLD AUTO: 6.06 K/UL — SIGNIFICANT CHANGE UP (ref 3.8–10.5)

## 2024-01-21 PROCEDURE — 99232 SBSQ HOSP IP/OBS MODERATE 35: CPT

## 2024-01-21 RX ADMIN — Medication 1 TABLET(S): at 12:15

## 2024-01-21 RX ADMIN — Medication 650 MILLIGRAM(S): at 12:49

## 2024-01-21 RX ADMIN — GABAPENTIN 100 MILLIGRAM(S): 400 CAPSULE ORAL at 22:25

## 2024-01-21 RX ADMIN — Medication 1 MILLIGRAM(S): at 12:14

## 2024-01-21 RX ADMIN — Medication 1 PATCH: at 12:15

## 2024-01-21 RX ADMIN — Medication 1 PATCH: at 12:49

## 2024-01-21 RX ADMIN — GABAPENTIN 100 MILLIGRAM(S): 400 CAPSULE ORAL at 06:44

## 2024-01-21 RX ADMIN — CHLORHEXIDINE GLUCONATE 1 APPLICATION(S): 213 SOLUTION TOPICAL at 12:15

## 2024-01-21 RX ADMIN — Medication 0.5 MILLIGRAM(S): at 17:36

## 2024-01-21 RX ADMIN — Medication 650 MILLIGRAM(S): at 12:15

## 2024-01-21 RX ADMIN — Medication 0.5 MILLIGRAM(S): at 06:46

## 2024-01-21 RX ADMIN — GABAPENTIN 100 MILLIGRAM(S): 400 CAPSULE ORAL at 15:28

## 2024-01-21 NOTE — PHYSICAL THERAPY INITIAL EVALUATION ADULT - GENERAL OBSERVATIONS, REHAB EVAL
Pt encountered in semi-supine position in NAD, all lines intact, a&ox4, +telemetry, SPO2 100%, and RN Dano aware.

## 2024-01-21 NOTE — PHYSICAL THERAPY INITIAL EVALUATION ADULT - ACTIVE RANGE OF MOTION EXAMINATION, REHAB EVAL
desi. upper extremity Active ROM was WNL (within normal limits)/bilateral lower extremity Active ROM was WNL (within normal limits)

## 2024-01-21 NOTE — PHYSICAL THERAPY INITIAL EVALUATION ADULT - ADDITIONAL COMMENTS
Pt states he has no where to stay and has been homeless.    Pt left semisupine in bed in NAD, all lines intact, call bell in reach, HR 91 bpm, bed alarm on, and RN Dano aware.

## 2024-01-21 NOTE — PROGRESS NOTE ADULT - SUBJECTIVE AND OBJECTIVE BOX
Patient is a 43y old  Male who presents with a chief complaint of Chest pain, alcohol withdrawal (20 Jan 2024 10:45)      INTERVAL HPI/OVERNIGHT EVENTS: JOHN overnight. This morning, no acute complaints.       REVIEW OF SYSTEMS:    CONSTITUTIONAL: No weakness, fevers or chills  EYES/ENT: No visual changes;  No vertigo or throat pain   NECK: No pain or stiffness  RESPIRATORY: No cough, wheezing, hemoptysis; No shortness of breath  CARDIOVASCULAR: No chest pain or palpitations  GASTROINTESTINAL: No abdominal or epigastric pain. No nausea, vomiting, or hematemesis; No diarrhea or constipation. No melena or hematochezia.  GENITOURINARY: No dysuria, frequency or hematuria  NEUROLOGICAL: No numbness or weakness  All other review of systems is negative unless indicated above.    FAMILY HISTORY:    T(C): 36.8 (01-21-24 @ 06:40), Max: 37.6 (01-20-24 @ 20:32)  HR: 70 (01-21-24 @ 06:40) (70 - 104)  BP: 109/73 (01-21-24 @ 06:40) (109/73 - 122/84)  RR: 18 (01-21-24 @ 06:40) (17 - 18)  SpO2: 100% (01-21-24 @ 06:40) (100% - 100%)  Wt(kg): --Vital Signs Last 24 Hrs  T(C): 36.8 (21 Jan 2024 06:40), Max: 37.6 (20 Jan 2024 20:32)  T(F): 98.2 (21 Jan 2024 06:40), Max: 99.7 (20 Jan 2024 20:32)  HR: 70 (21 Jan 2024 06:40) (70 - 104)  BP: 109/73 (21 Jan 2024 06:40) (109/73 - 122/84)  BP(mean): --  RR: 18 (21 Jan 2024 06:40) (17 - 18)  SpO2: 100% (21 Jan 2024 06:40) (100% - 100%)    Parameters below as of 21 Jan 2024 06:40  Patient On (Oxygen Delivery Method): room air        PHYSICAL EXAM:  GENERAL: NAD  CHEST/LUNG: Clear to auscultation bilaterally; No wheeze  HEART: Regular rate and rhythm  ABDOMEN: Soft, Nontender, Nondistended  EXTREMITIES: no LE edema, no hand tremors  PSYCH: Calm  NEUROLOGY: AAOx3  SKIN: No rashes or lesions, tattoos over large amount of BSA     Consultant(s) Notes Reviewed:  [x ] YES  [ ] NO  Care Discussed with Consultants/Other Providers [ x] YES  [ ] NO    LABS:                        13.6   6.06  )-----------( 184      ( 21 Jan 2024 06:50 )             41.3     21 Jan 2024 06:50    141    |  104    |  15     ----------------------------<  87     4.1     |  25     |  0.77     Ca    9.9        21 Jan 2024 06:50  Phos  5.2       21 Jan 2024 06:50  Mg     1.90      21 Jan 2024 06:50        CAPILLARY BLOOD GLUCOSE        BLOOD CULTURE      RADIOLOGY & ADDITIONAL TESTS:    Imaging Personally Reviewed:  [ ] YES  [ ] NO  acetaminophen     Tablet .. 650 milliGRAM(s) Oral every 6 hours PRN  aluminum hydroxide/magnesium hydroxide/simethicone Suspension 30 milliLiter(s) Oral every 4 hours PRN  chlorhexidine 2% Cloths 1 Application(s) Topical daily  folic acid 1 milliGRAM(s) Oral daily  gabapentin 100 milliGRAM(s) Oral three times a day  influenza   Vaccine 0.5 milliLiter(s) IntraMuscular once  LORazepam   Injectable 2 milliGRAM(s) IV Push every 2 hours PRN  LORazepam   Injectable   IV Push   LORazepam   Injectable 0.5 milliGRAM(s) IV Push every 12 hours  LORazepam   Injectable 2 milliGRAM(s) IV Push every 1 hour PRN  melatonin 3 milliGRAM(s) Oral at bedtime PRN  multivitamin 1 Tablet(s) Oral daily  nicotine - 21 mG/24Hr(s) Patch 1 Patch Transdermal daily  ondansetron Injectable 4 milliGRAM(s) IV Push every 8 hours PRN      HEALTH ISSUES - PROBLEM Dx:  Alcohol dependence with withdrawal    Atypical chest pain    Need for prophylactic measure    Transaminitis    Sinus tachycardia

## 2024-01-21 NOTE — PHYSICAL THERAPY INITIAL EVALUATION ADULT - LEVEL OF INDEPENDENCE: GAIT, REHAB EVAL
Tachycardic 130s; returned to seated position; relieved with rest./minimum assist (75% patients effort)

## 2024-01-22 LAB
ANION GAP SERPL CALC-SCNC: 12 MMOL/L — SIGNIFICANT CHANGE UP (ref 7–14)
BUN SERPL-MCNC: 17 MG/DL — SIGNIFICANT CHANGE UP (ref 7–23)
CALCIUM SERPL-MCNC: 9.6 MG/DL — SIGNIFICANT CHANGE UP (ref 8.4–10.5)
CHLORIDE SERPL-SCNC: 104 MMOL/L — SIGNIFICANT CHANGE UP (ref 98–107)
CO2 SERPL-SCNC: 24 MMOL/L — SIGNIFICANT CHANGE UP (ref 22–31)
CREAT SERPL-MCNC: 0.67 MG/DL — SIGNIFICANT CHANGE UP (ref 0.5–1.3)
EGFR: 117 ML/MIN/1.73M2 — SIGNIFICANT CHANGE UP
GLUCOSE SERPL-MCNC: 130 MG/DL — HIGH (ref 70–99)
HCT VFR BLD CALC: 38.8 % — LOW (ref 39–50)
HGB BLD-MCNC: 13 G/DL — SIGNIFICANT CHANGE UP (ref 13–17)
MAGNESIUM SERPL-MCNC: 1.9 MG/DL — SIGNIFICANT CHANGE UP (ref 1.6–2.6)
MCHC RBC-ENTMCNC: 33 PG — SIGNIFICANT CHANGE UP (ref 27–34)
MCHC RBC-ENTMCNC: 33.5 GM/DL — SIGNIFICANT CHANGE UP (ref 32–36)
MCV RBC AUTO: 98.5 FL — SIGNIFICANT CHANGE UP (ref 80–100)
NRBC # BLD: 0 /100 WBCS — SIGNIFICANT CHANGE UP (ref 0–0)
NRBC # FLD: 0 K/UL — SIGNIFICANT CHANGE UP (ref 0–0)
PHOSPHATE SERPL-MCNC: 4.5 MG/DL — SIGNIFICANT CHANGE UP (ref 2.5–4.5)
PLATELET # BLD AUTO: 193 K/UL — SIGNIFICANT CHANGE UP (ref 150–400)
POTASSIUM SERPL-MCNC: 3.4 MMOL/L — LOW (ref 3.5–5.3)
POTASSIUM SERPL-SCNC: 3.4 MMOL/L — LOW (ref 3.5–5.3)
RBC # BLD: 3.94 M/UL — LOW (ref 4.2–5.8)
RBC # FLD: 12.9 % — SIGNIFICANT CHANGE UP (ref 10.3–14.5)
SODIUM SERPL-SCNC: 140 MMOL/L — SIGNIFICANT CHANGE UP (ref 135–145)
WBC # BLD: 6.79 K/UL — SIGNIFICANT CHANGE UP (ref 3.8–10.5)
WBC # FLD AUTO: 6.79 K/UL — SIGNIFICANT CHANGE UP (ref 3.8–10.5)

## 2024-01-22 PROCEDURE — 99232 SBSQ HOSP IP/OBS MODERATE 35: CPT

## 2024-01-22 RX ORDER — NICOTINE POLACRILEX 2 MG
1 GUM BUCCAL
Qty: 0 | Refills: 0 | DISCHARGE
Start: 2024-01-22

## 2024-01-22 RX ORDER — FOLIC ACID 0.8 MG
1 TABLET ORAL
Qty: 0 | Refills: 0 | DISCHARGE
Start: 2024-01-22

## 2024-01-22 RX ORDER — TUBERCULIN PURIFIED PROTEIN DERIVATIVE 5 [IU]/.1ML
5 INJECTION, SOLUTION INTRADERMAL ONCE
Refills: 0 | Status: DISCONTINUED | OUTPATIENT
Start: 2024-01-22 | End: 2024-01-22

## 2024-01-22 RX ORDER — POTASSIUM CHLORIDE 20 MEQ
40 PACKET (EA) ORAL ONCE
Refills: 0 | Status: COMPLETED | OUTPATIENT
Start: 2024-01-22 | End: 2024-01-22

## 2024-01-22 RX ORDER — GABAPENTIN 400 MG/1
2 CAPSULE ORAL
Qty: 0 | Refills: 0 | DISCHARGE
Start: 2024-01-22

## 2024-01-22 RX ORDER — GABAPENTIN 400 MG/1
200 CAPSULE ORAL THREE TIMES A DAY
Refills: 0 | Status: DISCONTINUED | OUTPATIENT
Start: 2024-01-22 | End: 2024-01-29

## 2024-01-22 RX ADMIN — Medication 1 PATCH: at 12:09

## 2024-01-22 RX ADMIN — Medication 1 TABLET(S): at 12:09

## 2024-01-22 RX ADMIN — Medication 40 MILLIEQUIVALENT(S): at 10:31

## 2024-01-22 RX ADMIN — GABAPENTIN 100 MILLIGRAM(S): 400 CAPSULE ORAL at 14:40

## 2024-01-22 RX ADMIN — Medication 650 MILLIGRAM(S): at 21:25

## 2024-01-22 RX ADMIN — Medication 650 MILLIGRAM(S): at 05:51

## 2024-01-22 RX ADMIN — Medication 650 MILLIGRAM(S): at 22:20

## 2024-01-22 RX ADMIN — GABAPENTIN 100 MILLIGRAM(S): 400 CAPSULE ORAL at 05:49

## 2024-01-22 RX ADMIN — Medication 1 MILLIGRAM(S): at 12:09

## 2024-01-22 RX ADMIN — CHLORHEXIDINE GLUCONATE 1 APPLICATION(S): 213 SOLUTION TOPICAL at 17:30

## 2024-01-22 RX ADMIN — Medication 1 PATCH: at 12:53

## 2024-01-22 RX ADMIN — Medication 1 PATCH: at 07:53

## 2024-01-22 RX ADMIN — GABAPENTIN 200 MILLIGRAM(S): 400 CAPSULE ORAL at 21:25

## 2024-01-22 NOTE — PROGRESS NOTE ADULT - SUBJECTIVE AND OBJECTIVE BOX
Patient is a 43y old  Male who presents with a chief complaint of Chest pain, alcohol withdrawal (21 Jan 2024 09:07)    SUBJECTIVE / OVERNIGHT EVENTS: No acute events. Comfortable. Continued neuropathic pain in both feet, chronic, below both ankles, not changed from prior. No nausea, vomiting, tremor, chest pain.    MEDICATIONS  (STANDING):  chlorhexidine 2% Cloths 1 Application(s) Topical daily  folic acid 1 milliGRAM(s) Oral daily  gabapentin 100 milliGRAM(s) Oral three times a day  influenza   Vaccine 0.5 milliLiter(s) IntraMuscular once  multivitamin 1 Tablet(s) Oral daily  nicotine - 21 mG/24Hr(s) Patch 1 Patch Transdermal daily  PPD  5 Tuberculin Unit(s) Injectable 5 Unit(s) IntraDermal once    MEDICATIONS  (PRN):  acetaminophen     Tablet .. 650 milliGRAM(s) Oral every 6 hours PRN Temp greater or equal to 38C (100.4F), Mild Pain (1 - 3)  aluminum hydroxide/magnesium hydroxide/simethicone Suspension 30 milliLiter(s) Oral every 4 hours PRN Dyspepsia  LORazepam   Injectable 2 milliGRAM(s) IV Push every 2 hours PRN CIWA-Ar score increase by 2 points and a total score of 7 or less  LORazepam   Injectable 2 milliGRAM(s) IV Push every 1 hour PRN CIWA-Ar score 8 or greater  melatonin 3 milliGRAM(s) Oral at bedtime PRN Insomnia  ondansetron Injectable 4 milliGRAM(s) IV Push every 8 hours PRN Nausea and/or Vomiting      CAPILLARY BLOOD GLUCOSE        I&O's Summary      PHYSICAL EXAM:  Vital Signs Last 24 Hrs  T(C): 36.6 (22 Jan 2024 12:07), Max: 36.8 (21 Jan 2024 21:59)  T(F): 97.9 (22 Jan 2024 12:07), Max: 98.2 (21 Jan 2024 21:59)  HR: 82 (22 Jan 2024 12:07) (82 - 94)  BP: 111/77 (22 Jan 2024 12:07) (111/77 - 131/94)  BP(mean): --  RR: 16 (22 Jan 2024 12:07) (16 - 16)  SpO2: 100% (22 Jan 2024 12:07) (100% - 100%)    Parameters below as of 22 Jan 2024 12:07  Patient On (Oxygen Delivery Method): room air    CONSTITUTIONAL: NAD, sitting up in bed  EYES: conjunctiva and sclera clear  ENMT: Moist oral mucosa  RESPIRATORY: Normal respiratory effort; lungs are clear to auscultation bilaterally  CARDIOVASCULAR: Regular rate and rhythm, normal S1 and S2,; No lower extremity edema  ABDOMEN: Nontender to palpation, normoactive bowel sounds, no rebound/guarding  PSYCH: calm,  affect appropriate  NEUROLOGY: CN 2-12 are intact and symmetric; no gross sensory deficits   SKIN: No rashes; no palpable lesions    LABS:                        13.0   6.79  )-----------( 193      ( 22 Jan 2024 05:40 )             38.8     01-22    140  |  104  |  17  ----------------------------<  130<H>  3.4<L>   |  24  |  0.67    Ca    9.6      22 Jan 2024 05:40  Phos  4.5     01-22  Mg     1.90     01-22            Urinalysis Basic - ( 22 Jan 2024 05:40 )    Color: x / Appearance: x / SG: x / pH: x  Gluc: 130 mg/dL / Ketone: x  / Bili: x / Urobili: x   Blood: x / Protein: x / Nitrite: x   Leuk Esterase: x / RBC: x / WBC x   Sq Epi: x / Non Sq Epi: x / Bacteria: x          RADIOLOGY & ADDITIONAL TESTS:  Results Reviewed:   Imaging Personally Reviewed:  Electrocardiogram Personally Reviewed:    COORDINATION OF CARE:  Care Discussed with Consultants/Other Providers [Y/N]:  Prior or Outpatient Records Reviewed [Y/N]:

## 2024-01-22 NOTE — SBIRT NOTE ADULT - NSSBIRTALCACTIVEREFTXDET_GEN_A_CORE
Provided SBIRT services: Full Screen Positive. Brief Intervention Performed and Referral to Treatment.  Screening results were reviewed with the patient and patient was provided information about healthy guidelines and  potential negative consequences associated with level of risk. Motivation and readiness to reduce or stop use was  discussed and goals and activities to make changes were suggested/offered.  Options discussed for further evaluation and treatment referral to treatment was completed. Patient will be discharge to [address], [facility] for inpatient SA.

## 2024-01-22 NOTE — DISCHARGE NOTE NURSING/CASE MANAGEMENT/SOCIAL WORK - NSDCPEFALRISK_GEN_ALL_CORE
For information on Fall & Injury Prevention, visit: [email or web address] OR  [email or web address] OR  https://www.cdc.gov/steadi/patient.html

## 2024-01-22 NOTE — SBIRT NOTE ADULT - NSSBIRTALCTYPDAY_GEN_A_CORE
Quality 110: Preventive Care And Screening: Influenza Immunization: Influenza Immunization previously received during influenza season 7, 8, or 9 Quality 111:Pneumonia Vaccination Status For Older Adults: Pneumococcal Vaccination Previously Received Quality 226: Preventive Care And Screening: Tobacco Use: Screening And Cessation Intervention: Patient screened for tobacco use and is an ex/non-smoker Quality 130: Documentation Of Current Medications In The Medical Record: Current Medications Documented Detail Level: Detailed

## 2024-01-22 NOTE — PROGRESS NOTE ADULT - PROBLEM SELECTOR PLAN 2
Sinus tachycardia likely 2/2 acute alcohol withdrawal, however pt also complaining of intermittent chest pain  - s/p IVF   - urine tox positive for THC and Benzos  - improved

## 2024-01-22 NOTE — DISCHARGE NOTE NURSING/CASE MANAGEMENT/SOCIAL WORK - PATIENT PORTAL LINK FT
You can access the FollowMyHealth Patient Portal offered by [facility] by registering at the following website: [email or web address]. By joining [facility]’s FollowMyHealth portal, you will also be able to view your health information using other applications (apps) compatible with our system.

## 2024-01-23 LAB
ANION GAP SERPL CALC-SCNC: 15 MMOL/L — HIGH (ref 7–14)
BUN SERPL-MCNC: 14 MG/DL — SIGNIFICANT CHANGE UP (ref 7–23)
CALCIUM SERPL-MCNC: 9.9 MG/DL — SIGNIFICANT CHANGE UP (ref 8.4–10.5)
CHLORIDE SERPL-SCNC: 104 MMOL/L — SIGNIFICANT CHANGE UP (ref 98–107)
CO2 SERPL-SCNC: 23 MMOL/L — SIGNIFICANT CHANGE UP (ref 22–31)
CREAT SERPL-MCNC: 0.75 MG/DL — SIGNIFICANT CHANGE UP (ref 0.5–1.3)
EGFR: 113 ML/MIN/1.73M2 — SIGNIFICANT CHANGE UP
GLUCOSE SERPL-MCNC: 88 MG/DL — SIGNIFICANT CHANGE UP (ref 70–99)
MAGNESIUM SERPL-MCNC: 2.2 MG/DL — SIGNIFICANT CHANGE UP (ref 1.6–2.6)
PHOSPHATE SERPL-MCNC: 4.4 MG/DL — SIGNIFICANT CHANGE UP (ref 2.5–4.5)
POTASSIUM SERPL-MCNC: 4.1 MMOL/L — SIGNIFICANT CHANGE UP (ref 3.5–5.3)
POTASSIUM SERPL-SCNC: 4.1 MMOL/L — SIGNIFICANT CHANGE UP (ref 3.5–5.3)
SODIUM SERPL-SCNC: 142 MMOL/L — SIGNIFICANT CHANGE UP (ref 135–145)

## 2024-01-23 PROCEDURE — 99232 SBSQ HOSP IP/OBS MODERATE 35: CPT

## 2024-01-23 RX ADMIN — Medication 1 PATCH: at 19:51

## 2024-01-23 RX ADMIN — Medication 1 MILLIGRAM(S): at 12:59

## 2024-01-23 RX ADMIN — Medication 1 TABLET(S): at 12:58

## 2024-01-23 RX ADMIN — CHLORHEXIDINE GLUCONATE 1 APPLICATION(S): 213 SOLUTION TOPICAL at 12:54

## 2024-01-23 RX ADMIN — GABAPENTIN 200 MILLIGRAM(S): 400 CAPSULE ORAL at 20:56

## 2024-01-23 RX ADMIN — GABAPENTIN 200 MILLIGRAM(S): 400 CAPSULE ORAL at 14:05

## 2024-01-23 RX ADMIN — Medication 1 PATCH: at 12:58

## 2024-01-23 RX ADMIN — GABAPENTIN 200 MILLIGRAM(S): 400 CAPSULE ORAL at 05:36

## 2024-01-23 NOTE — PROGRESS NOTE ADULT - SUBJECTIVE AND OBJECTIVE BOX
Patient is a 43y old  Male who presents with a chief complaint of Chest pain, alcohol withdrawal (22 Jan 2024 15:12)      SUBJECTIVE / OVERNIGHT EVENTS: No acute events. Reports chronic neuropathic foot pain feels better today, gabapentin uptitrated yesterday. Denies withdrawal symptoms. Tolerating PO.   ADDITIONAL REVIEW OF SYSTEMS:    MEDICATIONS  (STANDING):  chlorhexidine 2% Cloths 1 Application(s) Topical daily  folic acid 1 milliGRAM(s) Oral daily  gabapentin 200 milliGRAM(s) Oral three times a day  influenza   Vaccine 0.5 milliLiter(s) IntraMuscular once  multivitamin 1 Tablet(s) Oral daily  nicotine - 21 mG/24Hr(s) Patch 1 Patch Transdermal daily    MEDICATIONS  (PRN):  acetaminophen     Tablet .. 650 milliGRAM(s) Oral every 6 hours PRN Temp greater or equal to 38C (100.4F), Mild Pain (1 - 3)  aluminum hydroxide/magnesium hydroxide/simethicone Suspension 30 milliLiter(s) Oral every 4 hours PRN Dyspepsia  melatonin 3 milliGRAM(s) Oral at bedtime PRN Insomnia      CAPILLARY BLOOD GLUCOSE        I&O's Summary    23 Jan 2024 07:01  -  23 Jan 2024 20:15  --------------------------------------------------------  IN: 960 mL / OUT: 0 mL / NET: 960 mL        PHYSICAL EXAM:  Vital Signs Last 24 Hrs  T(C): 36.9 (23 Jan 2024 15:30), Max: 36.9 (23 Jan 2024 15:30)  T(F): 98.4 (23 Jan 2024 15:30), Max: 98.4 (23 Jan 2024 15:30)  HR: 78 (23 Jan 2024 15:30) (58 - 87)  BP: 128/86 (23 Jan 2024 15:30) (105/78 - 128/86)  BP(mean): --  RR: 18 (23 Jan 2024 15:30) (16 - 18)  SpO2: 100% (23 Jan 2024 15:30) (100% - 100%)    Parameters below as of 23 Jan 2024 15:30  Patient On (Oxygen Delivery Method): room air    CONSTITUTIONAL: NAD, inclined in bed  EYES: conjunctiva and sclera clear  ENMT: Moist oral mucosa  RESPIRATORY: Normal respiratory effort; lungs are clear to auscultation bilaterally  CARDIOVASCULAR: Regular rate and rhythm, normal S1 and S2,; No lower extremity edema  ABDOMEN: Nontender to palpation, normoactive bowel sounds, no rebound/guarding  PSYCH: calm,  affect appropriate  SKIN: No rashes; no palpable lesions    LABS:                        13.0   6.79  )-----------( 193      ( 22 Jan 2024 05:40 )             38.8     01-23    142  |  104  |  14  ----------------------------<  88  4.1   |  23  |  0.75    Ca    9.9      23 Jan 2024 07:12  Phos  4.4     01-23  Mg     2.20     01-23            Urinalysis Basic - ( 23 Jan 2024 07:12 )    Color: x / Appearance: x / SG: x / pH: x  Gluc: 88 mg/dL / Ketone: x  / Bili: x / Urobili: x   Blood: x / Protein: x / Nitrite: x   Leuk Esterase: x / RBC: x / WBC x   Sq Epi: x / Non Sq Epi: x / Bacteria: x          RADIOLOGY & ADDITIONAL TESTS: Reviewed    COORDINATION OF CARE:  Care Discussed with Consultants/Other Providers [Y- medicine ACP]

## 2024-01-24 PROCEDURE — 99232 SBSQ HOSP IP/OBS MODERATE 35: CPT

## 2024-01-24 RX ADMIN — Medication 1 TABLET(S): at 11:58

## 2024-01-24 RX ADMIN — Medication 1 PATCH: at 19:40

## 2024-01-24 RX ADMIN — Medication 1 PATCH: at 11:25

## 2024-01-24 RX ADMIN — CHLORHEXIDINE GLUCONATE 1 APPLICATION(S): 213 SOLUTION TOPICAL at 12:00

## 2024-01-24 RX ADMIN — Medication 1 PATCH: at 07:25

## 2024-01-24 RX ADMIN — GABAPENTIN 200 MILLIGRAM(S): 400 CAPSULE ORAL at 21:49

## 2024-01-24 RX ADMIN — GABAPENTIN 200 MILLIGRAM(S): 400 CAPSULE ORAL at 05:02

## 2024-01-24 RX ADMIN — Medication 1 PATCH: at 11:57

## 2024-01-24 RX ADMIN — Medication 1 MILLIGRAM(S): at 11:58

## 2024-01-24 RX ADMIN — GABAPENTIN 200 MILLIGRAM(S): 400 CAPSULE ORAL at 11:54

## 2024-01-24 NOTE — PROGRESS NOTE ADULT - SUBJECTIVE AND OBJECTIVE BOX
Patient is a 43y old  Male who presents with a chief complaint of Chest pain, alcohol withdrawal (23 Jan 2024 20:15)    SUBJECTIVE / OVERNIGHT EVENTS: No acute events. Laying in bed comfortably, denies tremors, nausea, chest pain, shortness of breath. Denies SI or HI. Awaiting placement.    MEDICATIONS  (STANDING):  chlorhexidine 2% Cloths 1 Application(s) Topical daily  folic acid 1 milliGRAM(s) Oral daily  gabapentin 200 milliGRAM(s) Oral three times a day  influenza   Vaccine 0.5 milliLiter(s) IntraMuscular once  multivitamin 1 Tablet(s) Oral daily  nicotine - 21 mG/24Hr(s) Patch 1 Patch Transdermal daily    MEDICATIONS  (PRN):  acetaminophen     Tablet .. 650 milliGRAM(s) Oral every 6 hours PRN Temp greater or equal to 38C (100.4F), Mild Pain (1 - 3)  aluminum hydroxide/magnesium hydroxide/simethicone Suspension 30 milliLiter(s) Oral every 4 hours PRN Dyspepsia  melatonin 3 milliGRAM(s) Oral at bedtime PRN Insomnia    CAPILLARY BLOOD GLUCOSE    I&O's Summary    23 Jan 2024 07:01  -  24 Jan 2024 07:00  --------------------------------------------------------  IN: 960 mL / OUT: 0 mL / NET: 960 mL    PHYSICAL EXAM:  Vital Signs Last 24 Hrs  T(C): 37 (24 Jan 2024 12:05), Max: 37.5 (23 Jan 2024 20:59)  T(F): 98.6 (24 Jan 2024 12:05), Max: 99.5 (23 Jan 2024 20:59)  HR: 84 (24 Jan 2024 12:05) (73 - 101)  BP: 122/88 (24 Jan 2024 12:05) (119/78 - 128/86)  BP(mean): --  RR: 18 (24 Jan 2024 12:05) (18 - 19)  SpO2: 100% (24 Jan 2024 12:05) (98% - 100%)    Parameters below as of 24 Jan 2024 12:05  Patient On (Oxygen Delivery Method): room air    CONSTITUTIONAL: NAD, sitting up in bed  EYES: conjunctiva and sclera clear  ENMT: Moist oral mucosa  RESPIRATORY: Normal respiratory effort; lungs are clear to auscultation bilaterally  CARDIOVASCULAR: Regular rate and rhythm, normal S1 and S2,; No lower extremity edema  ABDOMEN: Nontender to palpation, normoactive bowel sounds, no rebound/guarding  PSYCH: calm, affect appropriate, no SI or HI  SKIN: No rashes; no palpable lesions    LABS:    01-23    142  |  104  |  14  ----------------------------<  88  4.1   |  23  |  0.75    Ca    9.9      23 Jan 2024 07:12  Phos  4.4     01-23  Mg     2.20     01-23    Urinalysis Basic - ( 23 Jan 2024 07:12 )    Color: x / Appearance: x / SG: x / pH: x  Gluc: 88 mg/dL / Ketone: x  / Bili: x / Urobili: x   Blood: x / Protein: x / Nitrite: x   Leuk Esterase: x / RBC: x / WBC x   Sq Epi: x / Non Sq Epi: x / Bacteria: x    RADIOLOGY & ADDITIONAL TESTS: Reviewed    COORDINATION OF CARE:  Care Discussed with Consultants/Other Providers [Y- Medicine ACP]

## 2024-01-24 NOTE — PROGRESS NOTE ADULT - NSPROGADDITIONALINFOA_GEN_ALL_CORE
Dispo planning to rehab
Greater than 50 minutes spent with patient and on patient's care plan.
Greater than 50 minutes spent with patient and on patient's care plan.
Safe dispo pending, appreciate CM/SW
Applied

## 2024-01-25 LAB
ANION GAP SERPL CALC-SCNC: 11 MMOL/L — SIGNIFICANT CHANGE UP (ref 7–14)
BUN SERPL-MCNC: 20 MG/DL — SIGNIFICANT CHANGE UP (ref 7–23)
CALCIUM SERPL-MCNC: 9.7 MG/DL — SIGNIFICANT CHANGE UP (ref 8.4–10.5)
CHLORIDE SERPL-SCNC: 103 MMOL/L — SIGNIFICANT CHANGE UP (ref 98–107)
CO2 SERPL-SCNC: 25 MMOL/L — SIGNIFICANT CHANGE UP (ref 22–31)
CREAT SERPL-MCNC: 0.7 MG/DL — SIGNIFICANT CHANGE UP (ref 0.5–1.3)
EGFR: 116 ML/MIN/1.73M2 — SIGNIFICANT CHANGE UP
GLUCOSE SERPL-MCNC: 91 MG/DL — SIGNIFICANT CHANGE UP (ref 70–99)
HCT VFR BLD CALC: 39.1 % — SIGNIFICANT CHANGE UP (ref 39–50)
HGB BLD-MCNC: 13.1 G/DL — SIGNIFICANT CHANGE UP (ref 13–17)
MAGNESIUM SERPL-MCNC: 2 MG/DL — SIGNIFICANT CHANGE UP (ref 1.6–2.6)
MCHC RBC-ENTMCNC: 32.3 PG — SIGNIFICANT CHANGE UP (ref 27–34)
MCHC RBC-ENTMCNC: 33.5 GM/DL — SIGNIFICANT CHANGE UP (ref 32–36)
MCV RBC AUTO: 96.5 FL — SIGNIFICANT CHANGE UP (ref 80–100)
NRBC # BLD: 0 /100 WBCS — SIGNIFICANT CHANGE UP (ref 0–0)
NRBC # FLD: 0 K/UL — SIGNIFICANT CHANGE UP (ref 0–0)
PHOSPHATE SERPL-MCNC: 5.3 MG/DL — HIGH (ref 2.5–4.5)
PLATELET # BLD AUTO: 309 K/UL — SIGNIFICANT CHANGE UP (ref 150–400)
POTASSIUM SERPL-MCNC: 4 MMOL/L — SIGNIFICANT CHANGE UP (ref 3.5–5.3)
POTASSIUM SERPL-SCNC: 4 MMOL/L — SIGNIFICANT CHANGE UP (ref 3.5–5.3)
RBC # BLD: 4.05 M/UL — LOW (ref 4.2–5.8)
RBC # FLD: 12.7 % — SIGNIFICANT CHANGE UP (ref 10.3–14.5)
SODIUM SERPL-SCNC: 139 MMOL/L — SIGNIFICANT CHANGE UP (ref 135–145)
WBC # BLD: 7.22 K/UL — SIGNIFICANT CHANGE UP (ref 3.8–10.5)
WBC # FLD AUTO: 7.22 K/UL — SIGNIFICANT CHANGE UP (ref 3.8–10.5)

## 2024-01-25 PROCEDURE — 99232 SBSQ HOSP IP/OBS MODERATE 35: CPT

## 2024-01-25 RX ADMIN — GABAPENTIN 200 MILLIGRAM(S): 400 CAPSULE ORAL at 22:00

## 2024-01-25 RX ADMIN — GABAPENTIN 200 MILLIGRAM(S): 400 CAPSULE ORAL at 05:23

## 2024-01-25 RX ADMIN — Medication 1 PATCH: at 19:19

## 2024-01-25 RX ADMIN — GABAPENTIN 200 MILLIGRAM(S): 400 CAPSULE ORAL at 14:00

## 2024-01-25 RX ADMIN — Medication 650 MILLIGRAM(S): at 14:01

## 2024-01-25 NOTE — PROGRESS NOTE ADULT - SUBJECTIVE AND OBJECTIVE BOX
Patient is a 43y old  Male who presents with a chief complaint of Chest pain, alcohol withdrawal (24 Jan 2024 14:56)    SUBJECTIVE / OVERNIGHT EVENTS: No acute events. Laying in bed, comfortable, awaiting placement.     MEDICATIONS  (STANDING):  chlorhexidine 2% Cloths 1 Application(s) Topical daily  folic acid 1 milliGRAM(s) Oral daily  gabapentin 200 milliGRAM(s) Oral three times a day  influenza   Vaccine 0.5 milliLiter(s) IntraMuscular once  multivitamin 1 Tablet(s) Oral daily  nicotine - 21 mG/24Hr(s) Patch 1 Patch Transdermal daily    MEDICATIONS  (PRN):  acetaminophen     Tablet .. 650 milliGRAM(s) Oral every 6 hours PRN Temp greater or equal to 38C (100.4F), Mild Pain (1 - 3)  aluminum hydroxide/magnesium hydroxide/simethicone Suspension 30 milliLiter(s) Oral every 4 hours PRN Dyspepsia  melatonin 3 milliGRAM(s) Oral at bedtime PRN Insomnia      CAPILLARY BLOOD GLUCOSE        I&O's Summary    25 Jan 2024 07:01  -  25 Jan 2024 20:07  --------------------------------------------------------  IN: 1440 mL / OUT: 0 mL / NET: 1440 mL        PHYSICAL EXAM:  Vital Signs Last 24 Hrs  T(C): 36.5 (25 Jan 2024 13:10), Max: 36.7 (24 Jan 2024 20:20)  T(F): 97.7 (25 Jan 2024 13:10), Max: 98.1 (24 Jan 2024 20:20)  HR: 80 (25 Jan 2024 13:10) (68 - 80)  BP: 110/71 (25 Jan 2024 13:10) (110/71 - 127/87)  BP(mean): --  RR: 18 (25 Jan 2024 13:10) (17 - 18)  SpO2: 99% (25 Jan 2024 13:10) (99% - 99%)    Parameters below as of 25 Jan 2024 13:10  Patient On (Oxygen Delivery Method): room air    CONSTITUTIONAL: NAD, laying in bed  EYES: conjunctiva and sclera clear  ENMT: Moist oral mucosa  RESPIRATORY: Normal respiratory effort; lungs are clear to auscultation bilaterally  CARDIOVASCULAR: Regular rate and rhythm, normal S1 and S2,; No lower extremity edema  ABDOMEN: Nontender to palpation, normoactive bowel sounds, no rebound/guarding  PSYCH: calm, affect appropriate  SKIN: No rashes; no palpable lesions    LABS:                        13.1   7.22  )-----------( 309      ( 25 Jan 2024 05:25 )             39.1     01-25    139  |  103  |  20  ----------------------------<  91  4.0   |  25  |  0.70    Ca    9.7      25 Jan 2024 05:25  Phos  5.3     01-25  Mg     2.00     01-25            Urinalysis Basic - ( 25 Jan 2024 05:25 )    Color: x / Appearance: x / SG: x / pH: x  Gluc: 91 mg/dL / Ketone: x  / Bili: x / Urobili: x   Blood: x / Protein: x / Nitrite: x   Leuk Esterase: x / RBC: x / WBC x   Sq Epi: x / Non Sq Epi: x / Bacteria: x          RADIOLOGY & ADDITIONAL TESTS: Reviewed    COORDINATION OF CARE:  Care Discussed with Consultants/Other Providers [Y- Medicine ACP]

## 2024-01-26 LAB — SARS-COV-2 RNA SPEC QL NAA+PROBE: SIGNIFICANT CHANGE UP

## 2024-01-26 PROCEDURE — 99232 SBSQ HOSP IP/OBS MODERATE 35: CPT

## 2024-01-26 RX ADMIN — GABAPENTIN 200 MILLIGRAM(S): 400 CAPSULE ORAL at 05:16

## 2024-01-26 RX ADMIN — Medication 1 PATCH: at 11:22

## 2024-01-26 RX ADMIN — CHLORHEXIDINE GLUCONATE 1 APPLICATION(S): 213 SOLUTION TOPICAL at 11:23

## 2024-01-26 RX ADMIN — Medication 1 MILLIGRAM(S): at 11:22

## 2024-01-26 RX ADMIN — GABAPENTIN 200 MILLIGRAM(S): 400 CAPSULE ORAL at 21:22

## 2024-01-26 RX ADMIN — Medication 1 TABLET(S): at 11:22

## 2024-01-26 RX ADMIN — Medication 1 PATCH: at 19:18

## 2024-01-26 RX ADMIN — Medication 1 PATCH: at 11:52

## 2024-01-26 RX ADMIN — GABAPENTIN 200 MILLIGRAM(S): 400 CAPSULE ORAL at 14:33

## 2024-01-26 NOTE — OCCUPATIONAL THERAPY INITIAL EVALUATION ADULT - ADDITIONAL COMMENTS
As per patient, independent with ADLs prior to admission, ambulated with a cane. reports several falls due to weakness and imbalance

## 2024-01-26 NOTE — PROGRESS NOTE ADULT - SUBJECTIVE AND OBJECTIVE BOX
Patient is a 43y old  Male who presents with a chief complaint of Chest pain, alcohol withdrawal (25 Jan 2024 20:07)    SUBJECTIVE / OVERNIGHT EVENTS: No acute events. Calm, comfortable, awaiting placement.     MEDICATIONS  (STANDING):  chlorhexidine 2% Cloths 1 Application(s) Topical daily  folic acid 1 milliGRAM(s) Oral daily  gabapentin 200 milliGRAM(s) Oral three times a day  influenza   Vaccine 0.5 milliLiter(s) IntraMuscular once  multivitamin 1 Tablet(s) Oral daily  nicotine - 21 mG/24Hr(s) Patch 1 Patch Transdermal daily    MEDICATIONS  (PRN):  acetaminophen     Tablet .. 650 milliGRAM(s) Oral every 6 hours PRN Temp greater or equal to 38C (100.4F), Mild Pain (1 - 3)  aluminum hydroxide/magnesium hydroxide/simethicone Suspension 30 milliLiter(s) Oral every 4 hours PRN Dyspepsia  melatonin 3 milliGRAM(s) Oral at bedtime PRN Insomnia      CAPILLARY BLOOD GLUCOSE        I&O's Summary    25 Jan 2024 07:01  -  26 Jan 2024 07:00  --------------------------------------------------------  IN: 1440 mL / OUT: 0 mL / NET: 1440 mL        PHYSICAL EXAM:  Vital Signs Last 24 Hrs  T(C): 36.3 (26 Jan 2024 14:06), Max: 36.5 (25 Jan 2024 21:13)  T(F): 97.4 (26 Jan 2024 14:06), Max: 97.7 (25 Jan 2024 21:13)  HR: 94 (26 Jan 2024 14:06) (64 - 94)  BP: 132/85 (26 Jan 2024 14:06) (101/67 - 132/85)  BP(mean): --  RR: 18 (26 Jan 2024 14:06) (17 - 18)  SpO2: 99% (26 Jan 2024 14:06) (99% - 99%)    Parameters below as of 26 Jan 2024 14:06  Patient On (Oxygen Delivery Method): room air    CONSTITUTIONAL: NAD, inclined in bed  EYES: conjunctiva and sclera clear  ENMT: Moist oral mucosa  RESPIRATORY: Normal respiratory effort; lungs are clear to auscultation bilaterally  CARDIOVASCULAR: Regular rate and rhythm, normal S1 and S2,; No lower extremity edema  ABDOMEN: Nontender to palpation, normoactive bowel sounds, no rebound/guarding  PSYCH: calm, affect appropriate  SKIN: No rashes; no palpable lesions    LABS:                        13.1   7.22  )-----------( 309      ( 25 Jan 2024 05:25 )             39.1     01-25    139  |  103  |  20  ----------------------------<  91  4.0   |  25  |  0.70    Ca    9.7      25 Jan 2024 05:25  Phos  5.3     01-25  Mg     2.00     01-25      Urinalysis Basic - ( 25 Jan 2024 05:25 )    Color: x / Appearance: x / SG: x / pH: x  Gluc: 91 mg/dL / Ketone: x  / Bili: x / Urobili: x   Blood: x / Protein: x / Nitrite: x   Leuk Esterase: x / RBC: x / WBC x   Sq Epi: x / Non Sq Epi: x / Bacteria: x    RADIOLOGY & ADDITIONAL TESTS: Reviewed    COORDINATION OF CARE:  Care Discussed with Consultants/Other Providers [Y- Medicine ACP]

## 2024-01-26 NOTE — OCCUPATIONAL THERAPY INITIAL EVALUATION ADULT - GENERAL OBSERVATIONS, REHAB EVAL
Upon entry, patient semi-supine in bed, patient agreeable to OT eval, cleared for OT evaluation as per LUCIO mahajan. Patient left semi-supine in bed, call bell in reach, all lines/tubes intact, bed alarm activated, vitals stable, NAD.

## 2024-01-27 PROCEDURE — 99232 SBSQ HOSP IP/OBS MODERATE 35: CPT

## 2024-01-27 RX ORDER — LIDOCAINE 4 G/100G
1 CREAM TOPICAL EVERY 24 HOURS
Refills: 0 | Status: DISCONTINUED | OUTPATIENT
Start: 2024-01-27 | End: 2024-01-31

## 2024-01-27 RX ADMIN — Medication 1 PATCH: at 18:26

## 2024-01-27 RX ADMIN — GABAPENTIN 200 MILLIGRAM(S): 400 CAPSULE ORAL at 13:13

## 2024-01-27 RX ADMIN — GABAPENTIN 200 MILLIGRAM(S): 400 CAPSULE ORAL at 05:23

## 2024-01-27 RX ADMIN — GABAPENTIN 200 MILLIGRAM(S): 400 CAPSULE ORAL at 21:48

## 2024-01-27 RX ADMIN — Medication 1 MILLIGRAM(S): at 13:11

## 2024-01-27 RX ADMIN — Medication 1 TABLET(S): at 13:10

## 2024-01-27 RX ADMIN — CHLORHEXIDINE GLUCONATE 1 APPLICATION(S): 213 SOLUTION TOPICAL at 13:12

## 2024-01-27 RX ADMIN — Medication 1 PATCH: at 13:11

## 2024-01-27 NOTE — PROGRESS NOTE ADULT - SUBJECTIVE AND OBJECTIVE BOX
Patient is a 43y old  Male who presents with a chief complaint of Chest pain, alcohol withdrawal (26 Jan 2024 16:03)    SUBJECTIVE / OVERNIGHT EVENTS: No acute events. Sitting up in bed, calm, feet pain slightly better but still bothersome at night- interested in trying lido patch topically.     MEDICATIONS  (STANDING):  chlorhexidine 2% Cloths 1 Application(s) Topical daily  folic acid 1 milliGRAM(s) Oral daily  gabapentin 200 milliGRAM(s) Oral three times a day  influenza   Vaccine 0.5 milliLiter(s) IntraMuscular once  multivitamin 1 Tablet(s) Oral daily  nicotine - 21 mG/24Hr(s) Patch 1 Patch Transdermal daily    MEDICATIONS  (PRN):  acetaminophen     Tablet .. 650 milliGRAM(s) Oral every 6 hours PRN Temp greater or equal to 38C (100.4F), Mild Pain (1 - 3)  aluminum hydroxide/magnesium hydroxide/simethicone Suspension 30 milliLiter(s) Oral every 4 hours PRN Dyspepsia  melatonin 3 milliGRAM(s) Oral at bedtime PRN Insomnia      CAPILLARY BLOOD GLUCOSE        I&O's Summary    26 Jan 2024 07:01  -  27 Jan 2024 07:00  --------------------------------------------------------  IN: 1440 mL / OUT: 0 mL / NET: 1440 mL        PHYSICAL EXAM:  Vital Signs Last 24 Hrs  T(C): 36.8 (27 Jan 2024 05:21), Max: 36.9 (26 Jan 2024 21:01)  T(F): 98.2 (27 Jan 2024 05:21), Max: 98.4 (26 Jan 2024 21:01)  HR: 70 (27 Jan 2024 05:21) (67 - 94)  BP: 130/88 (27 Jan 2024 05:21) (118/74 - 132/85)  BP(mean): --  RR: 17 (27 Jan 2024 05:21) (17 - 18)  SpO2: 100% (27 Jan 2024 05:21) (99% - 100%)    Parameters below as of 27 Jan 2024 05:21  Patient On (Oxygen Delivery Method): room air    CONSTITUTIONAL: NAD, sitting up in bed  EYES: conjunctiva and sclera clear  ENMT: Moist oral mucosa  RESPIRATORY: Normal respiratory effort; lungs are clear to auscultation bilaterally  CARDIOVASCULAR: Regular rate and rhythm, normal S1 and S2,; No lower extremity edema  ABDOMEN: Nontender to palpation, normoactive bowel sounds, no rebound/guarding  PSYCH: calm, affect appropriate  SKIN: No rashes; no palpable lesions    LABS, RADIOLOGY & ADDITIONAL TESTS: Reviewed    COORDINATION OF CARE:  Care Discussed with Consultants/Other Providers [Y - medicine ACP]

## 2024-01-28 LAB
MRSA PCR RESULT.: SIGNIFICANT CHANGE UP
S AUREUS DNA NOSE QL NAA+PROBE: SIGNIFICANT CHANGE UP

## 2024-01-28 PROCEDURE — 99232 SBSQ HOSP IP/OBS MODERATE 35: CPT

## 2024-01-28 RX ORDER — CHLORHEXIDINE GLUCONATE 213 G/1000ML
1 SOLUTION TOPICAL DAILY
Refills: 0 | Status: DISCONTINUED | OUTPATIENT
Start: 2024-01-28 | End: 2024-02-14

## 2024-01-28 RX ADMIN — LIDOCAINE 1 PATCH: 4 CREAM TOPICAL at 14:07

## 2024-01-28 RX ADMIN — GABAPENTIN 200 MILLIGRAM(S): 400 CAPSULE ORAL at 22:33

## 2024-01-28 RX ADMIN — GABAPENTIN 200 MILLIGRAM(S): 400 CAPSULE ORAL at 05:22

## 2024-01-28 RX ADMIN — LIDOCAINE 1 PATCH: 4 CREAM TOPICAL at 13:35

## 2024-01-28 RX ADMIN — CHLORHEXIDINE GLUCONATE 1 APPLICATION(S): 213 SOLUTION TOPICAL at 11:25

## 2024-01-28 RX ADMIN — Medication 1 PATCH: at 13:04

## 2024-01-28 RX ADMIN — Medication 1 MILLIGRAM(S): at 11:22

## 2024-01-28 RX ADMIN — CHLORHEXIDINE GLUCONATE 1 APPLICATION(S): 213 SOLUTION TOPICAL at 11:19

## 2024-01-28 RX ADMIN — Medication 1 TABLET(S): at 11:21

## 2024-01-28 RX ADMIN — Medication 1 PATCH: at 11:24

## 2024-01-28 RX ADMIN — GABAPENTIN 200 MILLIGRAM(S): 400 CAPSULE ORAL at 13:28

## 2024-01-28 NOTE — PROGRESS NOTE ADULT - SUBJECTIVE AND OBJECTIVE BOX
Patient is a 43y old  Male who presents with a chief complaint of Chest pain, alcohol withdrawal (27 Jan 2024 13:26)    SUBJECTIVE / OVERNIGHT EVENTS: No acute events. Laying in bed, still with b/l feet pain, worse at night, currently not significantly bothersome. No chest pain, palpitations, dizziness, shortness of breath.     MEDICATIONS  (STANDING):  chlorhexidine 2% Cloths 1 Application(s) Topical daily  chlorhexidine 2% Cloths 1 Application(s) Topical daily  folic acid 1 milliGRAM(s) Oral daily  gabapentin 200 milliGRAM(s) Oral three times a day  influenza   Vaccine 0.5 milliLiter(s) IntraMuscular once  multivitamin 1 Tablet(s) Oral daily  nicotine - 21 mG/24Hr(s) Patch 1 Patch Transdermal daily    MEDICATIONS  (PRN):  acetaminophen     Tablet .. 650 milliGRAM(s) Oral every 6 hours PRN Temp greater or equal to 38C (100.4F), Mild Pain (1 - 3)  aluminum hydroxide/magnesium hydroxide/simethicone Suspension 30 milliLiter(s) Oral every 4 hours PRN Dyspepsia  lidocaine   4% Patch 1 Patch Transdermal every 24 hours PRN Left foot pain  lidocaine   4% Patch 1 Patch Transdermal every 24 hours PRN Right foot pain  melatonin 3 milliGRAM(s) Oral at bedtime PRN Insomnia    CAPILLARY BLOOD GLUCOSE    I&O's Summary      PHYSICAL EXAM:  Vital Signs Last 24 Hrs  T(C): 37.1 (28 Jan 2024 08:04), Max: 37.1 (28 Jan 2024 08:04)  T(F): 98.7 (28 Jan 2024 08:04), Max: 98.7 (28 Jan 2024 08:04)  HR: 84 (28 Jan 2024 08:04) (75 - 89)  BP: 126/81 (28 Jan 2024 08:04) (126/81 - 130/80)  BP(mean): --  RR: 18 (28 Jan 2024 08:04) (17 - 18)  SpO2: 100% (28 Jan 2024 08:04) (100% - 100%)    Parameters below as of 28 Jan 2024 08:04  Patient On (Oxygen Delivery Method): room air    CONSTITUTIONAL: NAD, sitting up in bed  EYES: conjunctiva and sclera clear  ENMT: Moist oral mucosa  RESPIRATORY: Normal respiratory effort; lungs are clear to auscultation bilaterally  CARDIOVASCULAR: Regular rate and rhythm, normal S1 and S2,; No lower extremity edema  ABDOMEN: Nontender to palpation, normoactive bowel sounds, no rebound/guarding  PSYCH: calm, affect appropriate  SKIN: No rashes; no palpable lesions    LABS, RADIOLOGY & ADDITIONAL TESTS: Reviewed  COORDINATION OF CARE:  Care Discussed with Consultants/Other Providers [Y- medicine ACP]

## 2024-01-29 DIAGNOSIS — G62.9 POLYNEUROPATHY, UNSPECIFIED: ICD-10-CM

## 2024-01-29 PROCEDURE — 99232 SBSQ HOSP IP/OBS MODERATE 35: CPT

## 2024-01-29 RX ORDER — GABAPENTIN 400 MG/1
300 CAPSULE ORAL THREE TIMES A DAY
Refills: 0 | Status: DISCONTINUED | OUTPATIENT
Start: 2024-01-29 | End: 2024-01-31

## 2024-01-29 RX ADMIN — Medication 1 PATCH: at 11:24

## 2024-01-29 RX ADMIN — CHLORHEXIDINE GLUCONATE 1 APPLICATION(S): 213 SOLUTION TOPICAL at 11:24

## 2024-01-29 RX ADMIN — GABAPENTIN 200 MILLIGRAM(S): 400 CAPSULE ORAL at 14:28

## 2024-01-29 RX ADMIN — GABAPENTIN 300 MILLIGRAM(S): 400 CAPSULE ORAL at 22:46

## 2024-01-29 RX ADMIN — GABAPENTIN 200 MILLIGRAM(S): 400 CAPSULE ORAL at 06:34

## 2024-01-29 RX ADMIN — Medication 1 MILLIGRAM(S): at 11:24

## 2024-01-29 RX ADMIN — Medication 1 TABLET(S): at 11:24

## 2024-01-29 RX ADMIN — Medication 1 PATCH: at 11:30

## 2024-01-29 RX ADMIN — Medication 1 PATCH: at 23:28

## 2024-01-29 NOTE — PROGRESS NOTE ADULT - PROBLEM SELECTOR PLAN 4
resolved  - Troponin negative x3  - TTE: left ventricular systolic function is normal with an ejection fraction of 54%  - Would not perform stress test while actively withdrawing. Also pain resolved. Low suspicion for cardiac at this time. Can consider outpatient eval.

## 2024-01-29 NOTE — PROGRESS NOTE ADULT - PROBLEM SELECTOR PLAN 2
Chronic neuropathic LE discomfort, suspect in setting of chronic long standing etoh abuse.   - B12 not low  - Uptitrated gabapentin to 300 TID, patient reports positive response

## 2024-01-29 NOTE — PROGRESS NOTE ADULT - SUBJECTIVE AND OBJECTIVE BOX
Sergey Cruz MD  Division of Hospitalist Medicine  Pager [phone]  Available on Teams    CC: Patient is a 43y old  Male who presents with a chief complaint of Chest pain, alcohol withdrawal (28 Jan 2024 13:23)      INTERVAL EVENTS: JOHN    SUBJECTIVE / INTERVAL HPI: Patient seen and examined at bedside. Continues to complain of neuropathic burning in feet, otherwise no other complaints.    ROS: negative unless otherwise stated above.    VITAL SIGNS:  Vital Signs Last 24 Hrs  T(C): 36.5 (29 Jan 2024 09:00), Max: 37.1 (28 Jan 2024 17:06)  T(F): 97.7 (29 Jan 2024 09:00), Max: 98.7 (28 Jan 2024 17:06)  HR: 86 (29 Jan 2024 09:00) (76 - 89)  BP: 132/85 (29 Jan 2024 09:00) (100/61 - 138/86)  BP(mean): --  RR: 18 (29 Jan 2024 09:00) (17 - 18)  SpO2: 100% (29 Jan 2024 09:00) (100% - 100%)    Parameters below as of 29 Jan 2024 09:00  Patient On (Oxygen Delivery Method): room air            PHYSICAL EXAM:    General: NAD  HEENT: MMM  Neck: supple  Cardiovascular: +S1/S2; RRR  Respiratory: CTA B/L; no W/R/R  Gastrointestinal: soft, NT/ND  Extremities: WWP; no edema, clubbing or cyanosis  Vascular: palpable radial, DP pulses B/L  Neurological: AAOx3; no focal deficits    MEDICATIONS:  MEDICATIONS  (STANDING):  chlorhexidine 2% Cloths 1 Application(s) Topical daily  chlorhexidine 2% Cloths 1 Application(s) Topical daily  folic acid 1 milliGRAM(s) Oral daily  gabapentin 300 milliGRAM(s) Oral three times a day  influenza   Vaccine 0.5 milliLiter(s) IntraMuscular once  multivitamin 1 Tablet(s) Oral daily  nicotine - 21 mG/24Hr(s) Patch 1 Patch Transdermal daily    MEDICATIONS  (PRN):  acetaminophen     Tablet .. 650 milliGRAM(s) Oral every 6 hours PRN Temp greater or equal to 38C (100.4F), Mild Pain (1 - 3)  aluminum hydroxide/magnesium hydroxide/simethicone Suspension 30 milliLiter(s) Oral every 4 hours PRN Dyspepsia  lidocaine   4% Patch 1 Patch Transdermal every 24 hours PRN Left foot pain  lidocaine   4% Patch 1 Patch Transdermal every 24 hours PRN Right foot pain  melatonin 3 milliGRAM(s) Oral at bedtime PRN Insomnia      ALLERGIES:  Allergies    No Known Allergies    Intolerances        LABS:              CAPILLARY BLOOD GLUCOSE          RADIOLOGY & ADDITIONAL TESTS: Reviewed.

## 2024-01-30 PROCEDURE — 99232 SBSQ HOSP IP/OBS MODERATE 35: CPT

## 2024-01-30 RX ADMIN — Medication 1 MILLIGRAM(S): at 12:32

## 2024-01-30 RX ADMIN — Medication 1 PATCH: at 10:41

## 2024-01-30 RX ADMIN — Medication 1 PATCH: at 12:33

## 2024-01-30 RX ADMIN — CHLORHEXIDINE GLUCONATE 1 APPLICATION(S): 213 SOLUTION TOPICAL at 12:40

## 2024-01-30 RX ADMIN — Medication 1 TABLET(S): at 12:33

## 2024-01-30 RX ADMIN — GABAPENTIN 300 MILLIGRAM(S): 400 CAPSULE ORAL at 23:15

## 2024-01-30 RX ADMIN — GABAPENTIN 300 MILLIGRAM(S): 400 CAPSULE ORAL at 05:27

## 2024-01-30 RX ADMIN — GABAPENTIN 300 MILLIGRAM(S): 400 CAPSULE ORAL at 12:32

## 2024-01-30 NOTE — PROGRESS NOTE ADULT - SUBJECTIVE AND OBJECTIVE BOX
Sergey Cruz MD  Division of Hospitalist Medicine  Pager [phone]  Available on Teams    CC: Patient is a 43y old  Male who presents with a chief complaint of Chest pain, alcohol withdrawal (29 Jan 2024 15:58)      INTERVAL EVENTS: JOHN    SUBJECTIVE / INTERVAL HPI: Patient seen and examined at bedside. Pt grossly denies any new complaints.     ROS: negative unless otherwise stated above.    VITAL SIGNS:  Vital Signs Last 24 Hrs  T(C): 36.7 (30 Jan 2024 12:35), Max: 36.9 (29 Jan 2024 22:45)  T(F): 98.1 (30 Jan 2024 12:35), Max: 98.4 (29 Jan 2024 22:45)  HR: 92 (30 Jan 2024 12:35) (68 - 92)  BP: 106/72 (30 Jan 2024 12:35) (106/72 - 129/78)  BP(mean): --  RR: 19 (30 Jan 2024 12:35) (17 - 19)  SpO2: 95% (30 Jan 2024 12:35) (95% - 100%)    Parameters below as of 30 Jan 2024 12:35  Patient On (Oxygen Delivery Method): room air            PHYSICAL EXAM:    General: NAD  HEENT: MMM  Neck: supple  Cardiovascular: +S1/S2; RRR  Respiratory: CTA B/L; no W/R/R  Gastrointestinal: soft, NT/ND  Extremities: WWP; no edema, clubbing or cyanosis  Vascular: palpable radial, DP pulses B/L  Neurological: AAOx3; no focal deficits    MEDICATIONS:  MEDICATIONS  (STANDING):  chlorhexidine 2% Cloths 1 Application(s) Topical daily  chlorhexidine 2% Cloths 1 Application(s) Topical daily  folic acid 1 milliGRAM(s) Oral daily  gabapentin 300 milliGRAM(s) Oral three times a day  influenza   Vaccine 0.5 milliLiter(s) IntraMuscular once  multivitamin 1 Tablet(s) Oral daily  nicotine - 21 mG/24Hr(s) Patch 1 Patch Transdermal daily    MEDICATIONS  (PRN):  acetaminophen     Tablet .. 650 milliGRAM(s) Oral every 6 hours PRN Temp greater or equal to 38C (100.4F), Mild Pain (1 - 3)  aluminum hydroxide/magnesium hydroxide/simethicone Suspension 30 milliLiter(s) Oral every 4 hours PRN Dyspepsia  lidocaine   4% Patch 1 Patch Transdermal every 24 hours PRN Left foot pain  lidocaine   4% Patch 1 Patch Transdermal every 24 hours PRN Right foot pain  melatonin 3 milliGRAM(s) Oral at bedtime PRN Insomnia      ALLERGIES:  Allergies    No Known Allergies    Intolerances        LABS:              CAPILLARY BLOOD GLUCOSE          RADIOLOGY & ADDITIONAL TESTS: Reviewed.

## 2024-01-31 PROCEDURE — 99232 SBSQ HOSP IP/OBS MODERATE 35: CPT

## 2024-01-31 RX ORDER — GABAPENTIN 400 MG/1
400 CAPSULE ORAL THREE TIMES A DAY
Refills: 0 | Status: DISCONTINUED | OUTPATIENT
Start: 2024-01-31 | End: 2024-02-04

## 2024-01-31 RX ORDER — LIDOCAINE 4 G/100G
1 CREAM TOPICAL EVERY 24 HOURS
Refills: 0 | Status: DISCONTINUED | OUTPATIENT
Start: 2024-01-31 | End: 2024-02-14

## 2024-01-31 RX ADMIN — Medication 1 MILLIGRAM(S): at 11:58

## 2024-01-31 RX ADMIN — Medication 1 PATCH: at 11:58

## 2024-01-31 RX ADMIN — LIDOCAINE 1 PATCH: 4 CREAM TOPICAL at 17:28

## 2024-01-31 RX ADMIN — GABAPENTIN 300 MILLIGRAM(S): 400 CAPSULE ORAL at 14:32

## 2024-01-31 RX ADMIN — Medication 1 PATCH: at 11:56

## 2024-01-31 RX ADMIN — LIDOCAINE 1 PATCH: 4 CREAM TOPICAL at 21:47

## 2024-01-31 RX ADMIN — CHLORHEXIDINE GLUCONATE 1 APPLICATION(S): 213 SOLUTION TOPICAL at 11:59

## 2024-01-31 RX ADMIN — GABAPENTIN 300 MILLIGRAM(S): 400 CAPSULE ORAL at 06:38

## 2024-01-31 RX ADMIN — GABAPENTIN 400 MILLIGRAM(S): 400 CAPSULE ORAL at 22:03

## 2024-01-31 RX ADMIN — GABAPENTIN 400 MILLIGRAM(S): 400 CAPSULE ORAL at 18:11

## 2024-01-31 RX ADMIN — Medication 1 PATCH: at 21:48

## 2024-01-31 RX ADMIN — Medication 1 TABLET(S): at 11:57

## 2024-01-31 NOTE — PROGRESS NOTE ADULT - PROBLEM SELECTOR PLAN 2
Chronic neuropathic LE discomfort, suspect in setting of chronic long standing etoh abuse.   - B12 not low  - Uptitrated gabapentin to 400 TID, patient reports positive response  - standing lidocaine patches as patient reports positive response

## 2024-01-31 NOTE — PROGRESS NOTE ADULT - SUBJECTIVE AND OBJECTIVE BOX
Sergey Cruz MD  Division of Hospitalist Medicine  Pager [phone]  Available on Teams    CC: Patient is a 43y old  Male who presents with a chief complaint of Chest pain, alcohol withdrawal (30 Jan 2024 15:43)      INTERVAL EVENTS: JOHN    SUBJECTIVE / INTERVAL HPI: Patient seen and examined at bedside. Pt grossly denies any new complaints.     ROS: negative unless otherwise stated above.    VITAL SIGNS:  Vital Signs Last 24 Hrs  T(C): 36.6 (31 Jan 2024 12:00), Max: 36.7 (31 Jan 2024 06:30)  T(F): 97.9 (31 Jan 2024 12:00), Max: 98.1 (31 Jan 2024 06:30)  HR: 80 (31 Jan 2024 12:00) (67 - 89)  BP: 116/71 (31 Jan 2024 12:00) (116/71 - 136/75)  BP(mean): --  RR: 18 (31 Jan 2024 12:00) (18 - 18)  SpO2: 100% (31 Jan 2024 12:00) (100% - 100%)    Parameters below as of 31 Jan 2024 12:00  Patient On (Oxygen Delivery Method): room air            PHYSICAL EXAM:    General: NAD  HEENT: MMM  Neck: supple  Cardiovascular: +S1/S2; RRR  Respiratory: CTA B/L; no W/R/R  Gastrointestinal: soft, NT/ND  Extremities: WWP; no edema, clubbing or cyanosis  Vascular: palpable radial, DP pulses B/L  Neurological: AAOx3; no focal deficits    MEDICATIONS:  MEDICATIONS  (STANDING):  chlorhexidine 2% Cloths 1 Application(s) Topical daily  chlorhexidine 2% Cloths 1 Application(s) Topical daily  folic acid 1 milliGRAM(s) Oral daily  gabapentin 300 milliGRAM(s) Oral three times a day  influenza   Vaccine 0.5 milliLiter(s) IntraMuscular once  multivitamin 1 Tablet(s) Oral daily  nicotine - 21 mG/24Hr(s) Patch 1 Patch Transdermal daily    MEDICATIONS  (PRN):  acetaminophen     Tablet .. 650 milliGRAM(s) Oral every 6 hours PRN Temp greater or equal to 38C (100.4F), Mild Pain (1 - 3)  aluminum hydroxide/magnesium hydroxide/simethicone Suspension 30 milliLiter(s) Oral every 4 hours PRN Dyspepsia  lidocaine   4% Patch 1 Patch Transdermal every 24 hours PRN Left foot pain  lidocaine   4% Patch 1 Patch Transdermal every 24 hours PRN Right foot pain  melatonin 3 milliGRAM(s) Oral at bedtime PRN Insomnia      ALLERGIES:  Allergies    No Known Allergies    Intolerances        LABS:              CAPILLARY BLOOD GLUCOSE          RADIOLOGY & ADDITIONAL TESTS: Reviewed.

## 2024-02-01 PROCEDURE — 99232 SBSQ HOSP IP/OBS MODERATE 35: CPT

## 2024-02-01 RX ADMIN — LIDOCAINE 1 PATCH: 4 CREAM TOPICAL at 17:18

## 2024-02-01 RX ADMIN — GABAPENTIN 400 MILLIGRAM(S): 400 CAPSULE ORAL at 22:21

## 2024-02-01 RX ADMIN — LIDOCAINE 1 PATCH: 4 CREAM TOPICAL at 05:08

## 2024-02-01 RX ADMIN — GABAPENTIN 400 MILLIGRAM(S): 400 CAPSULE ORAL at 13:00

## 2024-02-01 RX ADMIN — LIDOCAINE 1 PATCH: 4 CREAM TOPICAL at 22:19

## 2024-02-01 RX ADMIN — CHLORHEXIDINE GLUCONATE 1 APPLICATION(S): 213 SOLUTION TOPICAL at 11:56

## 2024-02-01 RX ADMIN — Medication 1 TABLET(S): at 11:55

## 2024-02-01 RX ADMIN — GABAPENTIN 400 MILLIGRAM(S): 400 CAPSULE ORAL at 04:24

## 2024-02-01 RX ADMIN — Medication 1 PATCH: at 22:19

## 2024-02-01 RX ADMIN — Medication 1 MILLIGRAM(S): at 11:55

## 2024-02-01 RX ADMIN — Medication 1 PATCH: at 11:55

## 2024-02-01 RX ADMIN — Medication 1 PATCH: at 12:50

## 2024-02-01 NOTE — PROGRESS NOTE ADULT - SUBJECTIVE AND OBJECTIVE BOX
Sergey Cruz MD  Division of Hospitalist Medicine  Pager [phone]  Available on Teams    CC: Patient is a 43y old  Male who presents with a chief complaint of Chest pain, alcohol withdrawal (31 Jan 2024 15:21)      INTERVAL EVENTS: JOHN    SUBJECTIVE / INTERVAL HPI: Patient seen and examined at bedside. Pt grossly denies any new complaints.     ROS: negative unless otherwise stated above.    VITAL SIGNS:  Vital Signs Last 24 Hrs  T(C): 36.9 (01 Feb 2024 12:42), Max: 36.9 (01 Feb 2024 04:00)  T(F): 98.4 (01 Feb 2024 12:42), Max: 98.5 (01 Feb 2024 04:00)  HR: 79 (01 Feb 2024 12:42) (71 - 85)  BP: 112/76 (01 Feb 2024 12:42) (107/65 - 122/68)  BP(mean): --  RR: 18 (01 Feb 2024 12:42) (17 - 18)  SpO2: 100% (01 Feb 2024 12:42) (98% - 100%)    Parameters below as of 01 Feb 2024 12:42  Patient On (Oxygen Delivery Method): room air            PHYSICAL EXAM:    General: NAD  HEENT: MMM  Neck: supple  Cardiovascular: +S1/S2; RRR  Respiratory: CTA B/L; no W/R/R  Gastrointestinal: soft, NT/ND  Extremities: WWP; no edema, clubbing or cyanosis  Vascular: palpable radial, DP pulses B/L  Neurological: AAOx3; no focal deficits    MEDICATIONS:  MEDICATIONS  (STANDING):  chlorhexidine 2% Cloths 1 Application(s) Topical daily  chlorhexidine 2% Cloths 1 Application(s) Topical daily  folic acid 1 milliGRAM(s) Oral daily  gabapentin 400 milliGRAM(s) Oral three times a day  influenza   Vaccine 0.5 milliLiter(s) IntraMuscular once  lidocaine   4% Patch 1 Patch Transdermal every 24 hours  lidocaine   4% Patch 1 Patch Transdermal every 24 hours  multivitamin 1 Tablet(s) Oral daily  nicotine - 21 mG/24Hr(s) Patch 1 Patch Transdermal daily    MEDICATIONS  (PRN):  acetaminophen     Tablet .. 650 milliGRAM(s) Oral every 6 hours PRN Temp greater or equal to 38C (100.4F), Mild Pain (1 - 3)  aluminum hydroxide/magnesium hydroxide/simethicone Suspension 30 milliLiter(s) Oral every 4 hours PRN Dyspepsia  melatonin 3 milliGRAM(s) Oral at bedtime PRN Insomnia      ALLERGIES:  Allergies    No Known Allergies    Intolerances        LABS:              CAPILLARY BLOOD GLUCOSE          RADIOLOGY & ADDITIONAL TESTS: Reviewed.

## 2024-02-02 DIAGNOSIS — L73.9 FOLLICULAR DISORDER, UNSPECIFIED: ICD-10-CM

## 2024-02-02 PROCEDURE — 99232 SBSQ HOSP IP/OBS MODERATE 35: CPT

## 2024-02-02 RX ORDER — IBUPROFEN 200 MG
400 TABLET ORAL THREE TIMES A DAY
Refills: 0 | Status: DISCONTINUED | OUTPATIENT
Start: 2024-02-02 | End: 2024-02-14

## 2024-02-02 RX ADMIN — LIDOCAINE 1 PATCH: 4 CREAM TOPICAL at 17:21

## 2024-02-02 RX ADMIN — LIDOCAINE 1 PATCH: 4 CREAM TOPICAL at 06:23

## 2024-02-02 RX ADMIN — LIDOCAINE 1 PATCH: 4 CREAM TOPICAL at 23:31

## 2024-02-02 RX ADMIN — GABAPENTIN 400 MILLIGRAM(S): 400 CAPSULE ORAL at 14:12

## 2024-02-02 RX ADMIN — GABAPENTIN 400 MILLIGRAM(S): 400 CAPSULE ORAL at 05:44

## 2024-02-02 RX ADMIN — Medication 1 PATCH: at 23:31

## 2024-02-02 RX ADMIN — CHLORHEXIDINE GLUCONATE 1 APPLICATION(S): 213 SOLUTION TOPICAL at 11:11

## 2024-02-02 RX ADMIN — Medication 1 TABLET(S): at 11:12

## 2024-02-02 RX ADMIN — GABAPENTIN 400 MILLIGRAM(S): 400 CAPSULE ORAL at 22:31

## 2024-02-02 RX ADMIN — Medication 1 PATCH: at 11:11

## 2024-02-02 RX ADMIN — Medication 400 MILLIGRAM(S): at 22:30

## 2024-02-02 RX ADMIN — Medication 400 MILLIGRAM(S): at 23:30

## 2024-02-02 RX ADMIN — Medication 1 MILLIGRAM(S): at 11:12

## 2024-02-02 NOTE — PROGRESS NOTE ADULT - SUBJECTIVE AND OBJECTIVE BOX
Sergey Cruz MD  Division of Hospitalist Medicine  Pager [phone]  Available on Teams    CC: Patient is a 43y old  Male who presents with a chief complaint of Chest pain, alcohol withdrawal (01 Feb 2024 15:12)      INTERVAL EVENTS: JOHN    SUBJECTIVE / INTERVAL HPI: Patient seen and examined at bedside. Pt reports some pain on L buttock.     ROS: negative unless otherwise stated above.    VITAL SIGNS:  Vital Signs Last 24 Hrs  T(C): 36.7 (02 Feb 2024 08:13), Max: 37.1 (01 Feb 2024 22:20)  T(F): 98 (02 Feb 2024 08:13), Max: 98.8 (01 Feb 2024 22:20)  HR: 92 (02 Feb 2024 08:13) (82 - 92)  BP: 122/86 (02 Feb 2024 08:13) (121/77 - 126/79)  BP(mean): --  RR: 18 (02 Feb 2024 08:13) (18 - 18)  SpO2: 98% (02 Feb 2024 08:13) (98% - 99%)    Parameters below as of 02 Feb 2024 08:13  Patient On (Oxygen Delivery Method): room air          02-02-24 @ 07:01  -  02-02-24 @ 16:32  --------------------------------------------------------  IN: 600 mL / OUT: 0 mL / NET: 600 mL        PHYSICAL EXAM:    General: NAD  HEENT: MMM  Neck: supple  Cardiovascular: +S1/S2; RRR  Respiratory: CTA B/L; no W/R/R  Gastrointestinal: soft, NT/ND  Extremities: WWP; no edema, clubbing or cyanosis  Vascular: palpable radial, DP pulses B/L  Neurological: AAOx3; no focal deficits  Skin: L buttock with small < 1cm x 1cm area of induration without fluctuance or erythema in the gluteal fold on the L buttock    MEDICATIONS:  MEDICATIONS  (STANDING):  chlorhexidine 2% Cloths 1 Application(s) Topical daily  chlorhexidine 2% Cloths 1 Application(s) Topical daily  folic acid 1 milliGRAM(s) Oral daily  gabapentin 400 milliGRAM(s) Oral three times a day  influenza   Vaccine 0.5 milliLiter(s) IntraMuscular once  lidocaine   4% Patch 1 Patch Transdermal every 24 hours  lidocaine   4% Patch 1 Patch Transdermal every 24 hours  multivitamin 1 Tablet(s) Oral daily  nicotine - 21 mG/24Hr(s) Patch 1 Patch Transdermal daily    MEDICATIONS  (PRN):  acetaminophen     Tablet .. 650 milliGRAM(s) Oral every 6 hours PRN Temp greater or equal to 38C (100.4F), Mild Pain (1 - 3)  aluminum hydroxide/magnesium hydroxide/simethicone Suspension 30 milliLiter(s) Oral every 4 hours PRN Dyspepsia  melatonin 3 milliGRAM(s) Oral at bedtime PRN Insomnia      ALLERGIES:  Allergies    No Known Allergies    Intolerances        LABS:              CAPILLARY BLOOD GLUCOSE          RADIOLOGY & ADDITIONAL TESTS: Reviewed.

## 2024-02-02 NOTE — PROGRESS NOTE ADULT - PROBLEM SELECTOR PLAN 2
Chronic neuropathic LE discomfort, suspect in setting of chronic long standing etoh abuse.   - B12 not low  - Uptitrated gabapentin to 600 TID, patient reports positive response  - standing lidocaine patches as patient reports positive response

## 2024-02-02 NOTE — PROGRESS NOTE ADULT - PROBLEM SELECTOR PLAN 7
- Diet: Regular  - DVT ppx: SCDs  - Dispo: undomiciled, lives in shelter. PT recommending rehab. Peer to peer requested by insurance company, completed 1/25, requesting updated PT evaluation and documentation along with OT consult - requested, follow up recs and updated insurance decision. Awaiting update.

## 2024-02-03 PROCEDURE — 99232 SBSQ HOSP IP/OBS MODERATE 35: CPT

## 2024-02-03 RX ADMIN — LIDOCAINE 1 PATCH: 4 CREAM TOPICAL at 18:05

## 2024-02-03 RX ADMIN — Medication 1 MILLIGRAM(S): at 11:38

## 2024-02-03 RX ADMIN — Medication 1 PATCH: at 18:05

## 2024-02-03 RX ADMIN — Medication 1 APPLICATION(S): at 22:00

## 2024-02-03 RX ADMIN — GABAPENTIN 400 MILLIGRAM(S): 400 CAPSULE ORAL at 05:17

## 2024-02-03 RX ADMIN — Medication 400 MILLIGRAM(S): at 11:39

## 2024-02-03 RX ADMIN — GABAPENTIN 400 MILLIGRAM(S): 400 CAPSULE ORAL at 11:38

## 2024-02-03 RX ADMIN — Medication 1 PATCH: at 11:50

## 2024-02-03 RX ADMIN — LIDOCAINE 1 PATCH: 4 CREAM TOPICAL at 05:56

## 2024-02-03 RX ADMIN — Medication 1 PATCH: at 11:39

## 2024-02-03 RX ADMIN — Medication 400 MILLIGRAM(S): at 12:30

## 2024-02-03 RX ADMIN — LIDOCAINE 1 PATCH: 4 CREAM TOPICAL at 17:02

## 2024-02-03 RX ADMIN — LIDOCAINE 1 PATCH: 4 CREAM TOPICAL at 17:03

## 2024-02-03 RX ADMIN — GABAPENTIN 400 MILLIGRAM(S): 400 CAPSULE ORAL at 22:00

## 2024-02-03 RX ADMIN — Medication 1 TABLET(S): at 11:38

## 2024-02-03 RX ADMIN — Medication 1 PATCH: at 07:00

## 2024-02-03 NOTE — PROGRESS NOTE ADULT - SUBJECTIVE AND OBJECTIVE BOX
CC: Patient is a 43y old  Male who presents with a chief complaint of Chest pain, alcohol withdrawal (01 Feb 2024 15:12)      INTERVAL EVENTS: JOHN    SUBJECTIVE / INTERVAL HPI: Patient seen and examined at bedside.   Patient has no acute complaints  Notes mild itching to his back  Patient concerned about the amount of therapy he will get at [facility]      ROS: negative unless otherwise stated above.    VITAL SIGNS:  Vital Signs Last 24 Hrs  T(C): 36.7 (02 Feb 2024 08:13), Max: 37.1 (01 Feb 2024 22:20)  T(F): 98 (02 Feb 2024 08:13), Max: 98.8 (01 Feb 2024 22:20)  HR: 92 (02 Feb 2024 08:13) (82 - 92)  BP: 122/86 (02 Feb 2024 08:13) (121/77 - 126/79)  BP(mean): --  RR: 18 (02 Feb 2024 08:13) (18 - 18)  SpO2: 98% (02 Feb 2024 08:13) (98% - 99%)    Parameters below as of 02 Feb 2024 08:13  Patient On (Oxygen Delivery Method): room air          02-02-24 @ 07:01  -  02-02-24 @ 16:32  --------------------------------------------------------  IN: 600 mL / OUT: 0 mL / NET: 600 mL        PHYSICAL EXAM:    General: NAD  HEENT: MMM  Neck: supple  Cardiovascular: +S1/S2; RRR  Respiratory: CTA B/L; no W/R/R  Gastrointestinal: soft, NT/ND  Extremities: WWP; no edema, clubbing or cyanosis  Vascular: palpable radial, DP pulses B/L  Neurological: AAOx3; no focal deficits  Skin: L buttock with small < 1cm x 1cm area of induration without fluctuance or erythema in the gluteal fold on the L buttock    MEDICATIONS:  MEDICATIONS  (STANDING):  chlorhexidine 2% Cloths 1 Application(s) Topical daily  chlorhexidine 2% Cloths 1 Application(s) Topical daily  folic acid 1 milliGRAM(s) Oral daily  gabapentin 400 milliGRAM(s) Oral three times a day  influenza   Vaccine 0.5 milliLiter(s) IntraMuscular once  lidocaine   4% Patch 1 Patch Transdermal every 24 hours  lidocaine   4% Patch 1 Patch Transdermal every 24 hours  multivitamin 1 Tablet(s) Oral daily  nicotine - 21 mG/24Hr(s) Patch 1 Patch Transdermal daily    MEDICATIONS  (PRN):  acetaminophen     Tablet .. 650 milliGRAM(s) Oral every 6 hours PRN Temp greater or equal to 38C (100.4F), Mild Pain (1 - 3)  aluminum hydroxide/magnesium hydroxide/simethicone Suspension 30 milliLiter(s) Oral every 4 hours PRN Dyspepsia  melatonin 3 milliGRAM(s) Oral at bedtime PRN Insomnia      ALLERGIES:  Allergies    No Known Allergies    Intolerances        LABS:              CAPILLARY BLOOD GLUCOSE          RADIOLOGY & ADDITIONAL TESTS: Reviewed.

## 2024-02-04 PROCEDURE — 99232 SBSQ HOSP IP/OBS MODERATE 35: CPT

## 2024-02-04 RX ORDER — GABAPENTIN 400 MG/1
200 CAPSULE ORAL ONCE
Refills: 0 | Status: DISCONTINUED | OUTPATIENT
Start: 2024-02-04 | End: 2024-02-04

## 2024-02-04 RX ORDER — GABAPENTIN 400 MG/1
600 CAPSULE ORAL THREE TIMES A DAY
Refills: 0 | Status: DISCONTINUED | OUTPATIENT
Start: 2024-02-04 | End: 2024-02-13

## 2024-02-04 RX ADMIN — LIDOCAINE 1 PATCH: 4 CREAM TOPICAL at 06:37

## 2024-02-04 RX ADMIN — Medication 650 MILLIGRAM(S): at 15:45

## 2024-02-04 RX ADMIN — Medication 1 PATCH: at 11:00

## 2024-02-04 RX ADMIN — Medication 1 MILLIGRAM(S): at 13:06

## 2024-02-04 RX ADMIN — LIDOCAINE 1 PATCH: 4 CREAM TOPICAL at 19:22

## 2024-02-04 RX ADMIN — Medication 650 MILLIGRAM(S): at 15:02

## 2024-02-04 RX ADMIN — LIDOCAINE 1 PATCH: 4 CREAM TOPICAL at 17:53

## 2024-02-04 RX ADMIN — CHLORHEXIDINE GLUCONATE 1 APPLICATION(S): 213 SOLUTION TOPICAL at 13:06

## 2024-02-04 RX ADMIN — Medication 400 MILLIGRAM(S): at 17:55

## 2024-02-04 RX ADMIN — Medication 1 PATCH: at 13:07

## 2024-02-04 RX ADMIN — Medication 1 PATCH: at 07:00

## 2024-02-04 RX ADMIN — Medication 1 PATCH: at 19:23

## 2024-02-04 RX ADMIN — GABAPENTIN 400 MILLIGRAM(S): 400 CAPSULE ORAL at 21:19

## 2024-02-04 RX ADMIN — Medication 1 TABLET(S): at 13:06

## 2024-02-04 RX ADMIN — Medication 1 APPLICATION(S): at 06:15

## 2024-02-04 RX ADMIN — GABAPENTIN 400 MILLIGRAM(S): 400 CAPSULE ORAL at 06:15

## 2024-02-04 RX ADMIN — GABAPENTIN 400 MILLIGRAM(S): 400 CAPSULE ORAL at 13:07

## 2024-02-04 RX ADMIN — Medication 1 APPLICATION(S): at 17:56

## 2024-02-04 NOTE — PROGRESS NOTE ADULT - SUBJECTIVE AND OBJECTIVE BOX
CC: Patient is a 43y old  Male who presents with a chief complaint of Chest pain, alcohol withdrawal (01 Feb 2024 15:12)      INTERVAL EVENTS: JOHN    SUBJECTIVE / INTERVAL HPI: Patient seen and examined at bedside.   No new complaints  Given the name of his anticipated rehab facility      ROS: negative unless otherwise stated above.        PHYSICAL EXAM:    General: NAD  HEENT: MMM  Neck: supple  Cardiovascular: +S1/S2; RRR  Respiratory: CTA B/L; no W/R/R  Gastrointestinal: soft, NT/ND  Extremities: WWP; no edema, clubbing or cyanosis  Vascular: palpable radial, DP pulses B/L  Neurological: AAOx3; no focal deficits  Skin: L buttock with small < 1cm x 1cm area of induration without fluctuance or erythema in the gluteal fold on the L buttock    MEDICATIONS:  MEDICATIONS  (STANDING):  chlorhexidine 2% Cloths 1 Application(s) Topical daily  chlorhexidine 2% Cloths 1 Application(s) Topical daily  folic acid 1 milliGRAM(s) Oral daily  gabapentin 400 milliGRAM(s) Oral three times a day  influenza   Vaccine 0.5 milliLiter(s) IntraMuscular once  lidocaine   4% Patch 1 Patch Transdermal every 24 hours  lidocaine   4% Patch 1 Patch Transdermal every 24 hours  multivitamin 1 Tablet(s) Oral daily  nicotine - 21 mG/24Hr(s) Patch 1 Patch Transdermal daily    MEDICATIONS  (PRN):  acetaminophen     Tablet .. 650 milliGRAM(s) Oral every 6 hours PRN Temp greater or equal to 38C (100.4F), Mild Pain (1 - 3)  aluminum hydroxide/magnesium hydroxide/simethicone Suspension 30 milliLiter(s) Oral every 4 hours PRN Dyspepsia  melatonin 3 milliGRAM(s) Oral at bedtime PRN Insomnia      Recent Vitals  T(C): 36.9 (02-04-24 @ 09:47), Max: 37.1 (02-04-24 @ 05:00)  HR: 78 (02-04-24 @ 09:47) (75 - 88)  BP: 108/76 (02-04-24 @ 09:47) (100/65 - 125/88)  RR: 19 (02-04-24 @ 09:47) (17 - 19)  SpO2: 100% (02-04-24 @ 09:47) (100% - 100%)                  Home Medications:  folic acid 1 mg oral tablet: 1 tab(s) orally once a day (22 Jan 2024 15:57)  gabapentin 100 mg oral capsule: 2 cap(s) orally 3 times a day (22 Jan 2024 15:57)  Multiple Vitamins oral tablet: 1 tab(s) orally once a day (22 Jan 2024 15:57)  nicotine 21 mg/24 hr transdermal film, extended release: 1 patch transdermal once a day use as directed (22 Jan 2024 15:57)

## 2024-02-05 PROCEDURE — 99232 SBSQ HOSP IP/OBS MODERATE 35: CPT

## 2024-02-05 RX ADMIN — GABAPENTIN 600 MILLIGRAM(S): 400 CAPSULE ORAL at 05:13

## 2024-02-05 RX ADMIN — Medication 1 PATCH: at 13:27

## 2024-02-05 RX ADMIN — LIDOCAINE 1 PATCH: 4 CREAM TOPICAL at 18:20

## 2024-02-05 RX ADMIN — LIDOCAINE 1 PATCH: 4 CREAM TOPICAL at 05:24

## 2024-02-05 RX ADMIN — GABAPENTIN 600 MILLIGRAM(S): 400 CAPSULE ORAL at 21:19

## 2024-02-05 RX ADMIN — LIDOCAINE 1 PATCH: 4 CREAM TOPICAL at 18:21

## 2024-02-05 RX ADMIN — LIDOCAINE 1 PATCH: 4 CREAM TOPICAL at 19:16

## 2024-02-05 RX ADMIN — Medication 1 PATCH: at 13:20

## 2024-02-05 RX ADMIN — Medication 1 APPLICATION(S): at 18:19

## 2024-02-05 RX ADMIN — CHLORHEXIDINE GLUCONATE 1 APPLICATION(S): 213 SOLUTION TOPICAL at 13:34

## 2024-02-05 RX ADMIN — Medication 1 MILLIGRAM(S): at 13:29

## 2024-02-05 RX ADMIN — Medication 1 APPLICATION(S): at 05:32

## 2024-02-05 RX ADMIN — Medication 1 TABLET(S): at 13:28

## 2024-02-05 RX ADMIN — GABAPENTIN 600 MILLIGRAM(S): 400 CAPSULE ORAL at 13:35

## 2024-02-05 RX ADMIN — Medication 1 PATCH: at 12:49

## 2024-02-05 RX ADMIN — Medication 1 PATCH: at 19:16

## 2024-02-05 NOTE — PROGRESS NOTE ADULT - SUBJECTIVE AND OBJECTIVE BOX
[facility] Division of Hospital Medicine  Greg Laurie CABRERA  Pager [phone]  Available via MS Teams    SUBJECTIVE / OVERNIGHT EVENTS: No acute events overnight.     ADDITIONAL REVIEW OF SYSTEMS:    MEDICATIONS  (STANDING):  chlorhexidine 2% Cloths 1 Application(s) Topical daily  chlorhexidine 2% Cloths 1 Application(s) Topical daily  folic acid 1 milliGRAM(s) Oral daily  gabapentin 600 milliGRAM(s) Oral three times a day  influenza   Vaccine 0.5 milliLiter(s) IntraMuscular once  lidocaine   4% Patch 1 Patch Transdermal every 24 hours  lidocaine   4% Patch 1 Patch Transdermal every 24 hours  multivitamin 1 Tablet(s) Oral daily  nicotine - 21 mG/24Hr(s) Patch 1 Patch Transdermal daily  triamcinolone 0.1% Cream 1 Application(s) Topical every 12 hours    MEDICATIONS  (PRN):  acetaminophen     Tablet .. 650 milliGRAM(s) Oral every 6 hours PRN Temp greater or equal to 38C (100.4F), Mild Pain (1 - 3)  aluminum hydroxide/magnesium hydroxide/simethicone Suspension 30 milliLiter(s) Oral every 4 hours PRN Dyspepsia  ibuprofen  Tablet. 400 milliGRAM(s) Oral three times a day PRN Mild Pain (1 - 3)  melatonin 3 milliGRAM(s) Oral at bedtime PRN Insomnia      I&O's Summary      PHYSICAL EXAM:  Vital Signs Last 24 Hrs  T(C): 36.8 (05 Feb 2024 12:27), Max: 36.8 (05 Feb 2024 12:27)  T(F): 98.3 (05 Feb 2024 12:27), Max: 98.3 (05 Feb 2024 12:27)  HR: 88 (05 Feb 2024 12:27) (80 - 88)  BP: 117/77 (05 Feb 2024 12:27) (117/77 - 138/94)  BP(mean): --  RR: 18 (05 Feb 2024 12:27) (18 - 19)  SpO2: 99% (05 Feb 2024 12:27) (99% - 100%)    Parameters below as of 05 Feb 2024 12:27  Patient On (Oxygen Delivery Method): room air      CONSTITUTIONAL: NAD, well-developed, well-groomed  RESPIRATORY: Normal respiratory effort; lungs are clear to auscultation bilaterally  CARDIOVASCULAR: Regular rate and rhythm, normal S1 and S2, no murmur/rub/gallop; No lower extremity edema  ABDOMEN: Nontender to palpation, normoactive bowel sounds, no rebound/guarding; No hepatosplenomegaly  MUSCULOSKELETAL:  Normal gait; no clubbing or cyanosis of digits; no joint swelling or tenderness to palpation  PSYCH: A+O to person, place, and time; affect appropriate  NEUROLOGY: CN 2-12 are intact and symmetric; no gross sensory deficits   SKIN: No rashes; no palpable lesions    LABS:          COVID-19 PCR: NotDetec (26 Jan 2024 18:27)  SARS-CoV-2: NotDetec (16 Jan 2024 14:00)

## 2024-02-05 NOTE — PROGRESS NOTE ADULT - PROBLEM SELECTOR PLAN 2
Chronic neuropathic LE discomfort, suspect in setting of chronic long standing etoh abuse.   - B12 not low  - c/w gabapentin to 600 TID, patient reports positive response  - standing lidocaine patches as patient reports positive response

## 2024-02-05 NOTE — PROGRESS NOTE ADULT - TIME BILLING
Time-based billing (NON-critical care).     More than 50% of the visit was spent counseling and / or coordinating care by the attending physician.      The necessity of the time spent during the encounter on this date of service was due to: documentation in [place], reviewing chart and coordinating care with patient/ACPs and interdisciplinary staff (such as , social workers, etc) as well as reviewing vitals, laboratory data, radiology, medication list, and prior records. Interventions were performed as documented above.
Time-based billing (NON-critical care).     More than 50% of the visit was spent counseling and / or coordinating care by the attending physician.      The necessity of the time spent during the encounter on this date of service was due to: documentation in [place], reviewing chart and coordinating care with patient/ACPs and interdisciplinary staff (such as , social workers, etc) as well as reviewing vitals, laboratory data, radiology, medication list, and prior records. Interventions were performed as documented above.
Time-based billing (NON-critical care).     More than 50% of the visit was spent counseling and / or coordinating care by the attending physician.      The necessity of the time spent during the encounter on this date of service was due to: documentation in [place], reviewing chart and coordinating care with patient/ACPs and interdisciplinary staff (such as , social workers, etc) as well as reviewing vitals, laboratory data, radiology, medication list, and prior records. Interventions were performed as documented above.
Time-based billing (NON-critical care).     More than 50% of the visit was spent counseling and / or coordinating care by the attending physician.      The necessity of the time spent during the encounter on this date of service was due to: documentation in [place], reviewing chart and coordinating care with patient/ACPs and interdisciplinary staff (such as , social workers, etc) as well as reviewing vitals, laboratory data, radiology, medication list, and prior records. Interventions were performed as documented above.
Time-based billing (NON-critical care).     More than 50% of the visit was spent counseling and / or coordinating care by the attending physician.      The necessity of the time spent during the encounter on this date of service was due to: documentation in [place], reviewing chart and coordinating care with patient/ACPs and interdisciplinary staff (such as , social workers, etc) as well as reviewing vitals, laboratory data, radiology, medication list, and prior records. Interventions were performed as documented above.
Time spent on review of vitals, physical exam, documentation, and discussion of plan of care with patient and patient family.

## 2024-02-06 PROCEDURE — 99232 SBSQ HOSP IP/OBS MODERATE 35: CPT

## 2024-02-06 RX ADMIN — Medication 1 APPLICATION(S): at 06:09

## 2024-02-06 RX ADMIN — GABAPENTIN 600 MILLIGRAM(S): 400 CAPSULE ORAL at 21:05

## 2024-02-06 RX ADMIN — CHLORHEXIDINE GLUCONATE 1 APPLICATION(S): 213 SOLUTION TOPICAL at 12:29

## 2024-02-06 RX ADMIN — LIDOCAINE 1 PATCH: 4 CREAM TOPICAL at 20:30

## 2024-02-06 RX ADMIN — LIDOCAINE 1 PATCH: 4 CREAM TOPICAL at 05:04

## 2024-02-06 RX ADMIN — Medication 1 TABLET(S): at 12:27

## 2024-02-06 RX ADMIN — Medication 1 PATCH: at 12:26

## 2024-02-06 RX ADMIN — Medication 1 APPLICATION(S): at 21:06

## 2024-02-06 RX ADMIN — GABAPENTIN 600 MILLIGRAM(S): 400 CAPSULE ORAL at 05:02

## 2024-02-06 RX ADMIN — Medication 1 MILLIGRAM(S): at 12:27

## 2024-02-06 RX ADMIN — CHLORHEXIDINE GLUCONATE 1 APPLICATION(S): 213 SOLUTION TOPICAL at 12:28

## 2024-02-06 RX ADMIN — Medication 1 PATCH: at 07:39

## 2024-02-06 RX ADMIN — GABAPENTIN 600 MILLIGRAM(S): 400 CAPSULE ORAL at 12:27

## 2024-02-06 NOTE — PROGRESS NOTE ADULT - SUBJECTIVE AND OBJECTIVE BOX
[facility] Division of Hospital Medicine  Greg Laurie CABRERA  Pager [phone]  Available via MS Teams    SUBJECTIVE / OVERNIGHT EVENTS: No acute events overnight.     ADDITIONAL REVIEW OF SYSTEMS:    MEDICATIONS  (STANDING):  chlorhexidine 2% Cloths 1 Application(s) Topical daily  chlorhexidine 2% Cloths 1 Application(s) Topical daily  folic acid 1 milliGRAM(s) Oral daily  gabapentin 600 milliGRAM(s) Oral three times a day  influenza   Vaccine 0.5 milliLiter(s) IntraMuscular once  lidocaine   4% Patch 1 Patch Transdermal every 24 hours  lidocaine   4% Patch 1 Patch Transdermal every 24 hours  multivitamin 1 Tablet(s) Oral daily  nicotine - 21 mG/24Hr(s) Patch 1 Patch Transdermal daily  triamcinolone 0.1% Cream 1 Application(s) Topical every 12 hours    MEDICATIONS  (PRN):  acetaminophen     Tablet .. 650 milliGRAM(s) Oral every 6 hours PRN Temp greater or equal to 38C (100.4F), Mild Pain (1 - 3)  aluminum hydroxide/magnesium hydroxide/simethicone Suspension 30 milliLiter(s) Oral every 4 hours PRN Dyspepsia  ibuprofen  Tablet. 400 milliGRAM(s) Oral three times a day PRN Mild Pain (1 - 3)  melatonin 3 milliGRAM(s) Oral at bedtime PRN Insomnia      I&O's Summary      PHYSICAL EXAM:  Vital Signs Last 24 Hrs  T(C): 37.1 (06 Feb 2024 13:10), Max: 37.1 (05 Feb 2024 20:11)  T(F): 98.8 (06 Feb 2024 13:10), Max: 98.8 (06 Feb 2024 13:10)  HR: 71 (06 Feb 2024 13:10) (71 - 90)  BP: 111/83 (06 Feb 2024 13:10) (108/68 - 128/84)  BP(mean): --  RR: 18 (06 Feb 2024 13:10) (18 - 18)  SpO2: 100% (06 Feb 2024 13:10) (98% - 100%)    Parameters below as of 06 Feb 2024 13:10  Patient On (Oxygen Delivery Method): room air      CONSTITUTIONAL: NAD, well-developed, well-groomed    RESPIRATORY: Normal respiratory effort; lungs are clear to auscultation bilaterally  CARDIOVASCULAR: Regular rate and rhythm, normal S1 and S2, no murmur/rub/gallop; No lower extremity edema  ABDOMEN: Nontender to palpation, normoactive bowel sounds, no rebound/guarding; No hepatosplenomegaly  MUSCULOSKELETAL:  Normal gait; no clubbing or cyanosis of digits; no joint swelling or tenderness to palpation  PSYCH: A+O to person, place, and time; affect appropriate  NEUROLOGY: CN 2-12 are intact and symmetric; no gross sensory deficits   SKIN: No rashes; no palpable lesions    LABS:                    COVID-19 PCR: NotDetec (26 Jan 2024 18:27)  SARS-CoV-2: NotDetec (16 Jan 2024 14:00)      RADIOLOGY & ADDITIONAL TESTS:  New Results Reviewed Today:   New Imaging Personally Reviewed Today:  New Electrocardiogram Personally Reviewed Today:  Prior or Outpatient Records Reviewed Today:    COMMUNICATION:  Care Discussed with Consultants/Other Providers and Details of Discussion:  Discussions with Patient/Family:  PCP Communication:

## 2024-02-07 PROCEDURE — 99232 SBSQ HOSP IP/OBS MODERATE 35: CPT

## 2024-02-07 RX ADMIN — LIDOCAINE 1 PATCH: 4 CREAM TOPICAL at 10:42

## 2024-02-07 RX ADMIN — Medication 1 PATCH: at 06:18

## 2024-02-07 RX ADMIN — Medication 1 PATCH: at 18:03

## 2024-02-07 RX ADMIN — LIDOCAINE 1 PATCH: 4 CREAM TOPICAL at 18:03

## 2024-02-07 RX ADMIN — Medication 1 MILLIGRAM(S): at 12:13

## 2024-02-07 RX ADMIN — GABAPENTIN 600 MILLIGRAM(S): 400 CAPSULE ORAL at 14:25

## 2024-02-07 RX ADMIN — LIDOCAINE 1 PATCH: 4 CREAM TOPICAL at 18:02

## 2024-02-07 RX ADMIN — Medication 1 PATCH: at 12:13

## 2024-02-07 RX ADMIN — LIDOCAINE 1 PATCH: 4 CREAM TOPICAL at 17:28

## 2024-02-07 RX ADMIN — GABAPENTIN 600 MILLIGRAM(S): 400 CAPSULE ORAL at 21:04

## 2024-02-07 RX ADMIN — LIDOCAINE 1 PATCH: 4 CREAM TOPICAL at 06:18

## 2024-02-07 RX ADMIN — Medication 1 TABLET(S): at 12:13

## 2024-02-07 RX ADMIN — Medication 1 APPLICATION(S): at 21:04

## 2024-02-07 RX ADMIN — GABAPENTIN 600 MILLIGRAM(S): 400 CAPSULE ORAL at 05:52

## 2024-02-07 RX ADMIN — LIDOCAINE 1 PATCH: 4 CREAM TOPICAL at 17:27

## 2024-02-07 RX ADMIN — Medication 1 PATCH: at 12:05

## 2024-02-07 RX ADMIN — Medication 1 APPLICATION(S): at 05:52

## 2024-02-07 NOTE — PROGRESS NOTE ADULT - SUBJECTIVE AND OBJECTIVE BOX
[facility] Division of [facility] Medicine  Greg Laurie CABRERA  Pager [phone]  Available via MS Teams    SUBJECTIVE / OVERNIGHT EVENTS: No acute events overnight. Patient feels well overall, feels better after taking a shower this AM    ADDITIONAL REVIEW OF SYSTEMS:    MEDICATIONS  (STANDING):  chlorhexidine 2% Cloths 1 Application(s) Topical daily  chlorhexidine 2% Cloths 1 Application(s) Topical daily  folic acid 1 milliGRAM(s) Oral daily  gabapentin 600 milliGRAM(s) Oral three times a day  influenza   Vaccine 0.5 milliLiter(s) IntraMuscular once  lidocaine   4% Patch 1 Patch Transdermal every 24 hours  lidocaine   4% Patch 1 Patch Transdermal every 24 hours  multivitamin 1 Tablet(s) Oral daily  nicotine - 21 mG/24Hr(s) Patch 1 Patch Transdermal daily  triamcinolone 0.1% Cream 1 Application(s) Topical every 12 hours    MEDICATIONS  (PRN):  acetaminophen     Tablet .. 650 milliGRAM(s) Oral every 6 hours PRN Temp greater or equal to 38C (100.4F), Mild Pain (1 - 3)  aluminum hydroxide/magnesium hydroxide/simethicone Suspension 30 milliLiter(s) Oral every 4 hours PRN Dyspepsia  ibuprofen  Tablet. 400 milliGRAM(s) Oral three times a day PRN Mild Pain (1 - 3)  melatonin 3 milliGRAM(s) Oral at bedtime PRN Insomnia      I&O's Summary    06 Feb 2024 07:01  -  07 Feb 2024 07:00  --------------------------------------------------------  IN: 680 mL / OUT: 200 mL / NET: 480 mL        PHYSICAL EXAM:  Vital Signs Last 24 Hrs  T(C): 36.6 (07 Feb 2024 10:52), Max: 37.2 (06 Feb 2024 18:08)  T(F): 97.8 (07 Feb 2024 10:52), Max: 99 (06 Feb 2024 18:08)  HR: 81 (07 Feb 2024 10:52) (71 - 92)  BP: 127/89 (07 Feb 2024 10:52) (108/78 - 127/89)  BP(mean): --  RR: 18 (07 Feb 2024 10:52) (17 - 18)  SpO2: 98% (07 Feb 2024 10:52) (97% - 100%)    Parameters below as of 07 Feb 2024 10:52  Patient On (Oxygen Delivery Method): room air      CONSTITUTIONAL: NAD, well-developed, well-groomed  RESPIRATORY: Normal respiratory effort; lungs are clear to auscultation bilaterally  CARDIOVASCULAR: Regular rate and rhythm, normal S1 and S2, no murmur/rub/gallop; No lower extremity edema; Peripheral pulses are 2+ bilaterally  ABDOMEN: Nontender to palpation, normoactive bowel sounds, no rebound/guarding; No hepatosplenomegaly  MUSCULOSKELETAL:  Normal gait; no clubbing or cyanosis of digits; no joint swelling or tenderness to palpation  PSYCH: A+O to person, place, and time; affect appropriate  NEUROLOGY: CN 2-12 are intact and symmetric; no gross sensory deficits   SKIN: No rashes; no palpable lesions    LABS:                    COVID-19 PCR: [place] (26 Jan 2024 18:27)  SARS-CoV-2: NotDete (16 Jan 2024 14:00)

## 2024-02-07 NOTE — PROGRESS NOTE ADULT - PROBLEM SELECTOR PLAN 7
- Diet: Regular  - DVT ppx: SCDs  - Dispo: undomiciled, lives in shelter. PT recommending rehab. Peer to peer requested by insurance company, completed 1/25, requesting updated PT evaluation and documentation along with OT consult requested, follow up recs and updated insurance decision. Awaiting appeal process

## 2024-02-07 NOTE — PROGRESS NOTE ADULT - PROBLEM SELECTOR PLAN 2
Chronic neuropathic LE discomfort, suspect in setting of chronic long standing ETOH abuse.   - B12 nl  - c/w gabapentin to 600 TID, patient reports positive response  - standing lidocaine patches as patient reports positive response

## 2024-02-08 PROCEDURE — 99232 SBSQ HOSP IP/OBS MODERATE 35: CPT

## 2024-02-08 RX ADMIN — Medication 1 PATCH: at 18:48

## 2024-02-08 RX ADMIN — GABAPENTIN 600 MILLIGRAM(S): 400 CAPSULE ORAL at 06:10

## 2024-02-08 RX ADMIN — Medication 1 PATCH: at 06:49

## 2024-02-08 RX ADMIN — LIDOCAINE 1 PATCH: 4 CREAM TOPICAL at 05:43

## 2024-02-08 RX ADMIN — LIDOCAINE 1 PATCH: 4 CREAM TOPICAL at 18:48

## 2024-02-08 RX ADMIN — LIDOCAINE 1 PATCH: 4 CREAM TOPICAL at 18:47

## 2024-02-08 RX ADMIN — Medication 1 MILLIGRAM(S): at 12:54

## 2024-02-08 RX ADMIN — Medication 1 APPLICATION(S): at 18:00

## 2024-02-08 RX ADMIN — CHLORHEXIDINE GLUCONATE 1 APPLICATION(S): 213 SOLUTION TOPICAL at 12:53

## 2024-02-08 RX ADMIN — Medication 1 PATCH: at 12:53

## 2024-02-08 RX ADMIN — Medication 1 PATCH: at 12:50

## 2024-02-08 RX ADMIN — LIDOCAINE 1 PATCH: 4 CREAM TOPICAL at 17:59

## 2024-02-08 RX ADMIN — GABAPENTIN 600 MILLIGRAM(S): 400 CAPSULE ORAL at 21:41

## 2024-02-08 RX ADMIN — GABAPENTIN 600 MILLIGRAM(S): 400 CAPSULE ORAL at 12:54

## 2024-02-08 RX ADMIN — Medication 1 APPLICATION(S): at 06:10

## 2024-02-08 RX ADMIN — Medication 1 TABLET(S): at 12:57

## 2024-02-08 NOTE — PROGRESS NOTE ADULT - PROBLEM SELECTOR PLAN 7
- Diet: Regular  - DVT ppx: SCDs  - Dispo: undomiciled, lives in shelter. PT initially recommending rehab. But patient able to ambulate over 150ft, sop no longer needs rehab. Will need to return to shelter for care. - Diet: Regular  - DVT ppx: SCDs  - Dispo: undomiciled, lives in shelter. PT initially recommending rehab. But patient able to ambulate over 150ft, sop no longer needs rehab. Will need to return to shelter for care.  patient stable for discharge

## 2024-02-08 NOTE — PROGRESS NOTE ADULT - PROBLEM SELECTOR PLAN 2
Chronic neuropathic LE discomfort, suspect in setting of chronic long standing ETOH abuse.   - B12 nl  - c/w gabapentin to 600 TID, patient reports positive response  - standing lidocaine patches as patient reports positive response  - patient reevlauted by PT and patient able to ambulate 150Dt, so no longer needs [facility]. SW to discuss shelter options with patient Chronic neuropathic LE discomfort, suspect in setting of chronic long standing ETOH abuse.   - B12 nl  - c/w gabapentin to 600 TID, patient reports positive response  - standing lidocaine patches as patient reports positive response  - patient reevlauted by PT and patient able to ambulate 150ft, so no longer needs [facility]. SW to discuss shelter options with patient  - patient medically stable for discharge

## 2024-02-08 NOTE — PROGRESS NOTE ADULT - SUBJECTIVE AND OBJECTIVE BOX
[facility] Division of [facility] Medicine  Greg Fishmark CABRERA  Pager [phone]  Available via MS Teams    SUBJECTIVE / OVERNIGHT EVENTS: No acute events overnight. Patient worked with PT this AM, feeling a bit tired afterwards.    ADDITIONAL REVIEW OF SYSTEMS:    MEDICATIONS  (STANDING):  chlorhexidine 2% Cloths 1 Application(s) Topical daily  chlorhexidine 2% Cloths 1 Application(s) Topical daily  folic acid 1 milliGRAM(s) Oral daily  gabapentin 600 milliGRAM(s) Oral three times a day  influenza   Vaccine 0.5 milliLiter(s) IntraMuscular once  lidocaine   4% Patch 1 Patch Transdermal every 24 hours  lidocaine   4% Patch 1 Patch Transdermal every 24 hours  multivitamin 1 Tablet(s) Oral daily  nicotine - 21 mG/24Hr(s) Patch 1 Patch Transdermal daily  triamcinolone 0.1% Cream 1 Application(s) Topical every 12 hours    MEDICATIONS  (PRN):  acetaminophen     Tablet .. 650 milliGRAM(s) Oral every 6 hours PRN Temp greater or equal to 38C (100.4F), Mild Pain (1 - 3)  aluminum hydroxide/magnesium hydroxide/simethicone Suspension 30 milliLiter(s) Oral every 4 hours PRN Dyspepsia  ibuprofen  Tablet. 400 milliGRAM(s) Oral three times a day PRN Mild Pain (1 - 3)  melatonin 3 milliGRAM(s) Oral at bedtime PRN Insomnia      I&O's Summary    07 Feb 2024 07:01  -  08 Feb 2024 07:00  --------------------------------------------------------  IN: 720 mL / OUT: 300 mL / NET: 420 mL        PHYSICAL EXAM:  Vital Signs Last 24 Hrs  T(C): 36.8 (08 Feb 2024 10:37), Max: 37.4 (07 Feb 2024 17:32)  T(F): 98.3 (08 Feb 2024 10:37), Max: 99.4 (07 Feb 2024 17:32)  HR: 91 (08 Feb 2024 10:37) (74 - 91)  BP: 126/73 (08 Feb 2024 10:37) (107/68 - 126/90)  BP(mean): --  RR: 19 (08 Feb 2024 10:37) (16 - 19)  SpO2: 100% (08 Feb 2024 10:37) (99% - 100%)    Parameters below as of 08 Feb 2024 10:37  Patient On (Oxygen Delivery Method): room air      CONSTITUTIONAL: NAD, well-developed, well-groomed  RESPIRATORY: Normal respiratory effort; lungs are clear to auscultation bilaterally  CARDIOVASCULAR: Regular rate and rhythm, normal S1 and S2, no murmur/rub/gallop; No lower extremity edema  ABDOMEN: Nontender to palpation, normoactive bowel sounds, no rebound/guarding; No hepatosplenomegaly  MUSCULOSKELETAL:  Normal gait; no clubbing or cyanosis of digits; no joint swelling or tenderness to palpation  PSYCH: A+O to person, place, and time; affect appropriate  NEUROLOGY: CN 2-12 are intact and symmetric; no gross sensory deficits   SKIN: No rashes; no palpable lesions    LABS:                    COVID-19 PCR: NotDetec (26 Jan 2024 18:27)  SARS-CoV-2: NotDete (16 Jan 2024 14:00)

## 2024-02-09 PROCEDURE — 99232 SBSQ HOSP IP/OBS MODERATE 35: CPT

## 2024-02-09 RX ORDER — LIDOCAINE 4 G/100G
1 CREAM TOPICAL
Qty: 7 | Refills: 0
Start: 2024-02-09 | End: 2024-02-15

## 2024-02-09 RX ORDER — GABAPENTIN 400 MG/1
1 CAPSULE ORAL
Qty: 21 | Refills: 0
Start: 2024-02-09 | End: 2024-02-15

## 2024-02-09 RX ADMIN — Medication 1 APPLICATION(S): at 05:59

## 2024-02-09 RX ADMIN — GABAPENTIN 600 MILLIGRAM(S): 400 CAPSULE ORAL at 05:59

## 2024-02-09 RX ADMIN — Medication 400 MILLIGRAM(S): at 21:39

## 2024-02-09 RX ADMIN — LIDOCAINE 1 PATCH: 4 CREAM TOPICAL at 17:13

## 2024-02-09 RX ADMIN — CHLORHEXIDINE GLUCONATE 1 APPLICATION(S): 213 SOLUTION TOPICAL at 12:13

## 2024-02-09 RX ADMIN — Medication 1 TABLET(S): at 12:13

## 2024-02-09 RX ADMIN — LIDOCAINE 1 PATCH: 4 CREAM TOPICAL at 05:38

## 2024-02-09 RX ADMIN — LIDOCAINE 1 PATCH: 4 CREAM TOPICAL at 18:12

## 2024-02-09 RX ADMIN — Medication 1 PATCH: at 18:13

## 2024-02-09 RX ADMIN — Medication 1 PATCH: at 17:00

## 2024-02-09 RX ADMIN — LIDOCAINE 1 PATCH: 4 CREAM TOPICAL at 17:12

## 2024-02-09 RX ADMIN — Medication 1 PATCH: at 07:39

## 2024-02-09 RX ADMIN — GABAPENTIN 600 MILLIGRAM(S): 400 CAPSULE ORAL at 12:13

## 2024-02-09 RX ADMIN — Medication 1 MILLIGRAM(S): at 12:13

## 2024-02-09 RX ADMIN — GABAPENTIN 600 MILLIGRAM(S): 400 CAPSULE ORAL at 21:40

## 2024-02-09 RX ADMIN — Medication 400 MILLIGRAM(S): at 22:09

## 2024-02-09 RX ADMIN — Medication 1 PATCH: at 17:13

## 2024-02-09 RX ADMIN — Medication 1 APPLICATION(S): at 17:12

## 2024-02-09 NOTE — PROGRESS NOTE ADULT - SUBJECTIVE AND OBJECTIVE BOX
[facility] Division of [facility] Medicine  Greg Sullivan M.D  Pager [phone]  Available via MS Teams    SUBJECTIVE / OVERNIGHT EVENTS: No acute events overnight.     ADDITIONAL REVIEW OF SYSTEMS:    MEDICATIONS  (STANDING):  chlorhexidine 2% Cloths 1 Application(s) Topical daily  chlorhexidine 2% Cloths 1 Application(s) Topical daily  folic acid 1 milliGRAM(s) Oral daily  gabapentin 600 milliGRAM(s) Oral three times a day  influenza   Vaccine 0.5 milliLiter(s) IntraMuscular once  lidocaine   4% Patch 1 Patch Transdermal every 24 hours  lidocaine   4% Patch 1 Patch Transdermal every 24 hours  multivitamin 1 Tablet(s) Oral daily  nicotine - 21 mG/24Hr(s) Patch 1 Patch Transdermal daily  triamcinolone 0.1% Cream 1 Application(s) Topical every 12 hours    MEDICATIONS  (PRN):  acetaminophen     Tablet .. 650 milliGRAM(s) Oral every 6 hours PRN Temp greater or equal to 38C (100.4F), Mild Pain (1 - 3)  aluminum hydroxide/magnesium hydroxide/simethicone Suspension 30 milliLiter(s) Oral every 4 hours PRN Dyspepsia  ibuprofen  Tablet. 400 milliGRAM(s) Oral three times a day PRN Mild Pain (1 - 3)  melatonin 3 milliGRAM(s) Oral at bedtime PRN Insomnia      I&O's Summary    08 Feb 2024 07:01  -  09 Feb 2024 07:00  --------------------------------------------------------  IN: 720 mL / OUT: 0 mL / NET: 720 mL    09 Feb 2024 07:01  -  09 Feb 2024 13:11  --------------------------------------------------------  IN: 240 mL / OUT: 0 mL / NET: 240 mL        PHYSICAL EXAM:  Vital Signs Last 24 Hrs  T(C): 36.7 (09 Feb 2024 10:08), Max: 37.1 (08 Feb 2024 21:09)  T(F): 98.1 (09 Feb 2024 10:08), Max: 98.8 (08 Feb 2024 21:09)  HR: 78 (09 Feb 2024 10:08) (78 - 95)  BP: 108/69 (09 Feb 2024 10:08) (98/67 - 122/86)  BP(mean): --  RR: 18 (09 Feb 2024 10:08) (18 - 19)  SpO2: 100% (09 Feb 2024 10:08) (98% - 100%)    Parameters below as of 09 Feb 2024 10:08  Patient On (Oxygen Delivery Method): room air      CONSTITUTIONAL: NAD, well-developed, well-groomed  RESPIRATORY: Normal respiratory effort; lungs are clear to auscultation bilaterally  CARDIOVASCULAR: Regular rate and rhythm, normal S1 and S2, no murmur/rub/gallop; No lower extremity edema  ABDOMEN: Nontender to palpation, normoactive bowel sounds, no rebound/guarding; No hepatosplenomegaly  MUSCULOSKELETAL:  Normal gait; no clubbing or cyanosis of digits; no joint swelling or tenderness to palpation  PSYCH: A+O to person, place, and time; affect appropriate  NEUROLOGY: CN 2-12 are intact and symmetric; no gross sensory deficits   SKIN: No rashes; no palpable lesions    LABS:                    COVID-19 PCR: NotDetec (26 Jan 2024 18:27)  SARS-CoV-2: NotDetec (16 Jan 2024 14:00)

## 2024-02-09 NOTE — PROGRESS NOTE ADULT - PROBLEM SELECTOR PLAN 2
Chronic neuropathic LE discomfort, suspect in setting of chronic long standing ETOH abuse.   - B12 nl  - c/w gabapentin to 600 TID, patient reports positive response  - standing lidocaine patches as patient reports positive response  - patient re-evaluated by PT and patient able to ambulate 150ft, so no longer needs [facility]. SW to discuss shelter options with patient; patient not interested in shelter  - patient given a list of substance abuse rehabs, picked 3 facilities, SW to assess availability   - patient medically stable for discharge

## 2024-02-09 NOTE — PROGRESS NOTE ADULT - PROBLEM SELECTOR PLAN 7
- Diet: Regular  - DVT ppx: SCDs  - Dispo: undomiciled, lives in shelter. PT initially recommending rehab. But patient able to ambulate over 150ft, so no longer needs rehab. Patient not interested in shelter, given a list of substance abuse rehabs, given locations to SW.   patient stable for discharge

## 2024-02-09 NOTE — PROGRESS NOTE ADULT - PROBLEM SELECTOR PLAN 4
Sinus tachycardia likely 2/2 acute alcohol withdrawal, however pt also complaining of intermittent chest pain  - s/p IVF   - improved

## 2024-02-10 PROCEDURE — 99232 SBSQ HOSP IP/OBS MODERATE 35: CPT

## 2024-02-10 RX ADMIN — LIDOCAINE 1 PATCH: 4 CREAM TOPICAL at 17:26

## 2024-02-10 RX ADMIN — Medication 1 PATCH: at 12:03

## 2024-02-10 RX ADMIN — GABAPENTIN 600 MILLIGRAM(S): 400 CAPSULE ORAL at 22:55

## 2024-02-10 RX ADMIN — LIDOCAINE 1 PATCH: 4 CREAM TOPICAL at 20:03

## 2024-02-10 RX ADMIN — Medication 1 PATCH: at 17:00

## 2024-02-10 RX ADMIN — GABAPENTIN 600 MILLIGRAM(S): 400 CAPSULE ORAL at 13:42

## 2024-02-10 RX ADMIN — Medication 1 MILLIGRAM(S): at 12:03

## 2024-02-10 RX ADMIN — CHLORHEXIDINE GLUCONATE 1 APPLICATION(S): 213 SOLUTION TOPICAL at 12:08

## 2024-02-10 RX ADMIN — LIDOCAINE 1 PATCH: 4 CREAM TOPICAL at 05:05

## 2024-02-10 RX ADMIN — Medication 1 APPLICATION(S): at 06:33

## 2024-02-10 RX ADMIN — Medication 400 MILLIGRAM(S): at 07:02

## 2024-02-10 RX ADMIN — Medication 1 PATCH: at 20:04

## 2024-02-10 RX ADMIN — Medication 1 APPLICATION(S): at 17:26

## 2024-02-10 RX ADMIN — Medication 1 TABLET(S): at 12:03

## 2024-02-10 RX ADMIN — Medication 400 MILLIGRAM(S): at 06:32

## 2024-02-10 RX ADMIN — Medication 1 PATCH: at 07:05

## 2024-02-10 RX ADMIN — GABAPENTIN 600 MILLIGRAM(S): 400 CAPSULE ORAL at 06:34

## 2024-02-10 NOTE — PROGRESS NOTE ADULT - SUBJECTIVE AND OBJECTIVE BOX
[facility] Division of [facility] Medicine  Greg Fishmark CABRERA  Pager [phone]  Available via MS Teams    SUBJECTIVE / OVERNIGHT EVENTS: No acute events overnight. Denies headaches, fevers, chills or abdominal pain     ADDITIONAL REVIEW OF SYSTEMS:    MEDICATIONS  (STANDING):  chlorhexidine 2% Cloths 1 Application(s) Topical daily  chlorhexidine 2% Cloths 1 Application(s) Topical daily  folic acid 1 milliGRAM(s) Oral daily  gabapentin 600 milliGRAM(s) Oral three times a day  influenza   Vaccine 0.5 milliLiter(s) IntraMuscular once  lidocaine   4% Patch 1 Patch Transdermal every 24 hours  lidocaine   4% Patch 1 Patch Transdermal every 24 hours  multivitamin 1 Tablet(s) Oral daily  nicotine - 21 mG/24Hr(s) Patch 1 Patch Transdermal daily  triamcinolone 0.1% Cream 1 Application(s) Topical every 12 hours    MEDICATIONS  (PRN):  acetaminophen     Tablet .. 650 milliGRAM(s) Oral every 6 hours PRN Temp greater or equal to 38C (100.4F), Mild Pain (1 - 3)  aluminum hydroxide/magnesium hydroxide/simethicone Suspension 30 milliLiter(s) Oral every 4 hours PRN Dyspepsia  ibuprofen  Tablet. 400 milliGRAM(s) Oral three times a day PRN Mild Pain (1 - 3)  melatonin 3 milliGRAM(s) Oral at bedtime PRN Insomnia      I&O's Summary    09 Feb 2024 07:01  -  10 Feb 2024 07:00  --------------------------------------------------------  IN: 720 mL / OUT: 0 mL / NET: 720 mL        PHYSICAL EXAM:  Vital Signs Last 24 Hrs  T(C): 37.2 (10 Feb 2024 10:22), Max: 37.2 (10 Feb 2024 10:22)  T(F): 99 (10 Feb 2024 10:22), Max: 99 (10 Feb 2024 10:22)  HR: 89 (10 Feb 2024 10:22) (82 - 89)  BP: 120/87 (10 Feb 2024 10:22) (113/71 - 130/88)  BP(mean): --  RR: 18 (10 Feb 2024 10:22) (18 - 18)  SpO2: 99% (10 Feb 2024 10:22) (99% - 100%)    Parameters below as of 10 Feb 2024 10:22  Patient On (Oxygen Delivery Method): room air      CONSTITUTIONAL: NAD, well-developed, well-groomed  RESPIRATORY: Normal respiratory effort; lungs are clear to auscultation bilaterally  CARDIOVASCULAR: Regular rate and rhythm, normal S1 and S2, no murmur/rub/gallop; No lower extremity edema; Peripheral pulses are 2+ bilaterally  ABDOMEN: Nontender to palpation, normoactive bowel sounds, no rebound/guarding; No hepatosplenomegaly  MUSCULOSKELETAL:  Normal gait; no clubbing or cyanosis of digits; no joint swelling or tenderness to palpation  PSYCH: A+O to person, place, and time; affect appropriate  NEUROLOGY: CN 2-12 are intact and symmetric; no gross sensory deficits   SKIN: No rashes; no palpable lesions    LABS:                    COVID-19 PCR: NotDetec (26 Jan 2024 18:27)  SARS-CoV-2: NotDetec (16 Jan 2024 14:00)

## 2024-02-10 NOTE — PROGRESS NOTE ADULT - PROBLEM SELECTOR PLAN 7
- Diet: Regular  - DVT ppx: SCDs  - Dispo: undomiciled, lives in shelter. PT initially recommending rehab. But patient able to ambulate over 150ft, so no longer needs rehab. Patient not interested in shelter, given a list of substance abuse rehabs, given locations to SW.   patient stable for discharge    Given Discharge notice on 2/9, patient requesting an appeal.

## 2024-02-11 PROCEDURE — 99232 SBSQ HOSP IP/OBS MODERATE 35: CPT

## 2024-02-11 RX ADMIN — GABAPENTIN 600 MILLIGRAM(S): 400 CAPSULE ORAL at 21:50

## 2024-02-11 RX ADMIN — LIDOCAINE 1 PATCH: 4 CREAM TOPICAL at 06:10

## 2024-02-11 RX ADMIN — CHLORHEXIDINE GLUCONATE 1 APPLICATION(S): 213 SOLUTION TOPICAL at 12:42

## 2024-02-11 RX ADMIN — LIDOCAINE 1 PATCH: 4 CREAM TOPICAL at 17:36

## 2024-02-11 RX ADMIN — Medication 400 MILLIGRAM(S): at 12:28

## 2024-02-11 RX ADMIN — Medication 1 TABLET(S): at 12:29

## 2024-02-11 RX ADMIN — LIDOCAINE 1 PATCH: 4 CREAM TOPICAL at 17:35

## 2024-02-11 RX ADMIN — GABAPENTIN 600 MILLIGRAM(S): 400 CAPSULE ORAL at 06:14

## 2024-02-11 RX ADMIN — Medication 650 MILLIGRAM(S): at 12:39

## 2024-02-11 RX ADMIN — Medication 1 PATCH: at 12:29

## 2024-02-11 RX ADMIN — Medication 1 MILLIGRAM(S): at 12:29

## 2024-02-11 RX ADMIN — GABAPENTIN 600 MILLIGRAM(S): 400 CAPSULE ORAL at 15:07

## 2024-02-11 RX ADMIN — Medication 1 PATCH: at 08:31

## 2024-02-11 RX ADMIN — Medication 1 APPLICATION(S): at 17:35

## 2024-02-11 RX ADMIN — Medication 1 APPLICATION(S): at 12:41

## 2024-02-11 NOTE — PROGRESS NOTE ADULT - SUBJECTIVE AND OBJECTIVE BOX
[facility] Division of [facility] Medicine  Greg Whitesidegloria CABRERA  Pager [phone]  Available via MS Teams    SUBJECTIVE / OVERNIGHT EVENTS: No acute events overnight. Patient feels well overall. Denies any headahces, dizziness. States that he cannot "go back to the streets" as he will go back to drinking. Needs to go you substance abuse rehab and getr clean so that he can start working again.     ADDITIONAL REVIEW OF SYSTEMS:    MEDICATIONS  (STANDING):  chlorhexidine 2% Cloths 1 Application(s) Topical daily  chlorhexidine 2% Cloths 1 Application(s) Topical daily  folic acid 1 milliGRAM(s) Oral daily  gabapentin 600 milliGRAM(s) Oral three times a day  influenza   Vaccine 0.5 milliLiter(s) IntraMuscular once  lidocaine   4% Patch 1 Patch Transdermal every 24 hours  lidocaine   4% Patch 1 Patch Transdermal every 24 hours  multivitamin 1 Tablet(s) Oral daily  nicotine - 21 mG/24Hr(s) Patch 1 Patch Transdermal daily  triamcinolone 0.1% Cream 1 Application(s) Topical every 12 hours    MEDICATIONS  (PRN):  acetaminophen     Tablet .. 650 milliGRAM(s) Oral every 6 hours PRN Temp greater or equal to 38C (100.4F), Mild Pain (1 - 3)  aluminum hydroxide/magnesium hydroxide/simethicone Suspension 30 milliLiter(s) Oral every 4 hours PRN Dyspepsia  ibuprofen  Tablet. 400 milliGRAM(s) Oral three times a day PRN Mild Pain (1 - 3)  melatonin 3 milliGRAM(s) Oral at bedtime PRN Insomnia      I&O's Summary    10 Feb 2024 07:01  -  11 Feb 2024 07:00  --------------------------------------------------------  IN: 1640 mL / OUT: 1150 mL / NET: 490 mL        PHYSICAL EXAM:  Vital Signs Last 24 Hrs  T(C): 36.9 (11 Feb 2024 10:45), Max: 36.9 (11 Feb 2024 10:45)  T(F): 98.4 (11 Feb 2024 10:45), Max: 98.4 (11 Feb 2024 10:45)  HR: 75 (11 Feb 2024 10:45) (75 - 79)  BP: 101/64 (11 Feb 2024 10:45) (101/64 - 137/96)  BP(mean): --  RR: 18 (11 Feb 2024 10:45) (18 - 18)  SpO2: 98% (11 Feb 2024 10:45) (98% - 100%)    Parameters below as of 11 Feb 2024 10:45  Patient On (Oxygen Delivery Method): room air      CONSTITUTIONAL: NAD, well-developed, well-groomed  RESPIRATORY: Normal respiratory effort; lungs are clear to auscultation bilaterally  CARDIOVASCULAR: Regular rate and rhythm, normal S1 and S2, no murmur/rub/gallop; No lower extremity edema; Peripheral pulses are 2+ bilaterally  ABDOMEN: Nontender to palpation, normoactive bowel sounds, no rebound/guarding; No hepatosplenomegaly  MUSCULOSKELETAL:  Normal gait; no clubbing or cyanosis of digits; no joint swelling or tenderness to palpation  PSYCH: A+O to person, place, and time; affect appropriate  NEUROLOGY: CN 2-12 are intact and symmetric; no gross sensory deficits   SKIN: No rashes; no palpable lesions    LABS:                    COVID-19 PCR: NotDetec (26 Jan 2024 18:27)  SARS-CoV-2: NotDetec (16 Jan 2024 14:00)

## 2024-02-11 NOTE — PROGRESS NOTE ADULT - PROBLEM SELECTOR PLAN 7
- Diet: Regular  - DVT ppx: SCDs  - Dispo: undomiciled, lives in shelter. PT initially recommending rehab. But patient able to ambulate over 150ft, so no longer needs rehab. Patient not interested in shelter, given a list of substance abuse rehabs, given locations to .   patient stable for discharge    Given Discharge notice on 2/9, undergoing appeals process as patient states that he cannot go to the streets as he will go back to drinking. Needs to go to substance abuse rehab

## 2024-02-12 PROCEDURE — 90792 PSYCH DIAG EVAL W/MED SRVCS: CPT

## 2024-02-12 PROCEDURE — 99232 SBSQ HOSP IP/OBS MODERATE 35: CPT

## 2024-02-12 RX ADMIN — GABAPENTIN 600 MILLIGRAM(S): 400 CAPSULE ORAL at 05:59

## 2024-02-12 RX ADMIN — LIDOCAINE 1 PATCH: 4 CREAM TOPICAL at 19:10

## 2024-02-12 RX ADMIN — Medication 1 TABLET(S): at 13:46

## 2024-02-12 RX ADMIN — LIDOCAINE 1 PATCH: 4 CREAM TOPICAL at 05:55

## 2024-02-12 RX ADMIN — Medication 1 PATCH: at 13:47

## 2024-02-12 RX ADMIN — GABAPENTIN 600 MILLIGRAM(S): 400 CAPSULE ORAL at 13:48

## 2024-02-12 RX ADMIN — Medication 400 MILLIGRAM(S): at 06:04

## 2024-02-12 RX ADMIN — Medication 650 MILLIGRAM(S): at 10:29

## 2024-02-12 RX ADMIN — CHLORHEXIDINE GLUCONATE 1 APPLICATION(S): 213 SOLUTION TOPICAL at 13:47

## 2024-02-12 RX ADMIN — Medication 650 MILLIGRAM(S): at 09:57

## 2024-02-12 RX ADMIN — CHLORHEXIDINE GLUCONATE 1 APPLICATION(S): 213 SOLUTION TOPICAL at 13:48

## 2024-02-12 RX ADMIN — Medication 1 APPLICATION(S): at 06:00

## 2024-02-12 RX ADMIN — LIDOCAINE 1 PATCH: 4 CREAM TOPICAL at 19:11

## 2024-02-12 RX ADMIN — Medication 1 MILLIGRAM(S): at 13:46

## 2024-02-12 RX ADMIN — GABAPENTIN 600 MILLIGRAM(S): 400 CAPSULE ORAL at 21:27

## 2024-02-12 NOTE — BH CONSULTATION LIAISON ASSESSMENT NOTE - NSBHATTESTCOMMENTATTENDFT_PSY_A_CORE
agree with above - 43 year old M, EtOH use d/o, on screen does not appear to have active signs of MDD, bipolar d/o, or psychosis. Tolerating withdrawal taper well. Denies suicidality/intent or plan and denies homicidality/intent or plan. Denies AH/VH, delusions or paranoia. Denies decreased need for sleep, increased energy, racing thoughts, irritability euphoria or grandiosity. Wants to "get my life in order" and commit to rehab. On phone with rehab on hold when evaluated - shows motivation to improve and future orientation. No psych c/i to discharge and no role for psych meds as this time, patient would like to defer etoh cessation meds to rehab as he felt nauseous on naltrexone in the past. No indication for inpt psych hospitalization. Psych will sign off.

## 2024-02-12 NOTE — BH CONSULTATION LIAISON ASSESSMENT NOTE - NSBHCHARTREVIEWVS_PSY_A_CORE FT
Vital Signs Last 24 Hrs  T(C): 36.8 (12 Feb 2024 10:27), Max: 37.5 (11 Feb 2024 18:18)  T(F): 98.3 (12 Feb 2024 10:27), Max: 99.5 (11 Feb 2024 18:18)  HR: 86 (12 Feb 2024 10:27) (74 - 95)  BP: 104/76 (12 Feb 2024 10:27) (104/76 - 123/87)  BP(mean): --  RR: 18 (12 Feb 2024 10:27) (18 - 19)  SpO2: 96% (12 Feb 2024 10:27) (96% - 100%)    Parameters below as of 12 Feb 2024 10:27  Patient On (Oxygen Delivery Method): room air

## 2024-02-12 NOTE — PROGRESS NOTE ADULT - SUBJECTIVE AND OBJECTIVE BOX
Patient is a 43y old  Male who presents with a chief complaint of Chest pain, alcohol withdrawal (11 Feb 2024 12:41)      SUBJECTIVE / OVERNIGHT EVENTS:    MEDICATIONS  (STANDING):  chlorhexidine 2% Cloths 1 Application(s) Topical daily  chlorhexidine 2% Cloths 1 Application(s) Topical daily  folic acid 1 milliGRAM(s) Oral daily  gabapentin 600 milliGRAM(s) Oral three times a day  influenza   Vaccine 0.5 milliLiter(s) IntraMuscular once  lidocaine   4% Patch 1 Patch Transdermal every 24 hours  lidocaine   4% Patch 1 Patch Transdermal every 24 hours  multivitamin 1 Tablet(s) Oral daily  nicotine - 21 mG/24Hr(s) Patch 1 Patch Transdermal daily  triamcinolone 0.1% Cream 1 Application(s) Topical every 12 hours    MEDICATIONS  (PRN):  acetaminophen     Tablet .. 650 milliGRAM(s) Oral every 6 hours PRN Temp greater or equal to 38C (100.4F), Mild Pain (1 - 3)  aluminum hydroxide/magnesium hydroxide/simethicone Suspension 30 milliLiter(s) Oral every 4 hours PRN Dyspepsia  ibuprofen  Tablet. 400 milliGRAM(s) Oral three times a day PRN Mild Pain (1 - 3)  melatonin 3 milliGRAM(s) Oral at bedtime PRN Insomnia      Vital Signs Last 24 Hrs  T(C): 36.7 (12 Feb 2024 05:56), Max: 37.5 (11 Feb 2024 18:18)  T(F): 98 (12 Feb 2024 05:56), Max: 99.5 (11 Feb 2024 18:18)  HR: 74 (12 Feb 2024 05:56) (74 - 95)  BP: 120/78 (12 Feb 2024 05:56) (101/64 - 123/87)  BP(mean): --  RR: 18 (12 Feb 2024 05:56) (18 - 19)  SpO2: 100% (12 Feb 2024 05:56) (98% - 100%)    Parameters below as of 12 Feb 2024 05:56  Patient On (Oxygen Delivery Method): room air      CAPILLARY BLOOD GLUCOSE        I&O's Summary    11 Feb 2024 07:01  -  12 Feb 2024 07:00  --------------------------------------------------------  IN: 480 mL / OUT: 0 mL / NET: 480 mL        PHYSICAL EXAM:  GENERAL: NAD, well-developed  HEAD:  Atraumatic, Normocephalic  EYES: EOMI, PERRLA, conjunctiva and sclera clear  NECK: Supple, No JVD  CHEST/LUNG: Clear to auscultation bilaterally; No wheeze  HEART: Regular rate and rhythm; No murmurs, rubs, or gallops  ABDOMEN: Soft, Nontender, Nondistended; Bowel sounds present  EXTREMITIES:  2+ Peripheral Pulses, No clubbing, cyanosis, or edema  PSYCH: AAOx3  NEUROLOGY: non-focal  SKIN: No rashes or lesions    LABS:                    RADIOLOGY & ADDITIONAL TESTS:    Imaging Personally Reviewed:    Consultant(s) Notes Reviewed:      Care Discussed with Consultants/Other Providers:   Patient is a 43y old  Male who presents with a chief complaint of Chest pain, alcohol withdrawal (11 Feb 2024 12:41)      SUBJECTIVE / OVERNIGHT EVENTS: patient seen and examined by bedside, pt alert and awake, c/o pain in his feet and knees b/l , denies headache, dizziness, SOB, CP, Palpitations , N/V/D, abdominal pain      MEDICATIONS  (STANDING):  chlorhexidine 2% Cloths 1 Application(s) Topical daily  chlorhexidine 2% Cloths 1 Application(s) Topical daily  folic acid 1 milliGRAM(s) Oral daily  gabapentin 600 milliGRAM(s) Oral three times a day  influenza   Vaccine 0.5 milliLiter(s) IntraMuscular once  lidocaine   4% Patch 1 Patch Transdermal every 24 hours  lidocaine   4% Patch 1 Patch Transdermal every 24 hours  multivitamin 1 Tablet(s) Oral daily  nicotine - 21 mG/24Hr(s) Patch 1 Patch Transdermal daily  triamcinolone 0.1% Cream 1 Application(s) Topical every 12 hours    MEDICATIONS  (PRN):  acetaminophen     Tablet .. 650 milliGRAM(s) Oral every 6 hours PRN Temp greater or equal to 38C (100.4F), Mild Pain (1 - 3)  aluminum hydroxide/magnesium hydroxide/simethicone Suspension 30 milliLiter(s) Oral every 4 hours PRN Dyspepsia  ibuprofen  Tablet. 400 milliGRAM(s) Oral three times a day PRN Mild Pain (1 - 3)  melatonin 3 milliGRAM(s) Oral at bedtime PRN Insomnia      Vital Signs Last 24 Hrs  T(C): 36.7 (12 Feb 2024 05:56), Max: 37.5 (11 Feb 2024 18:18)  T(F): 98 (12 Feb 2024 05:56), Max: 99.5 (11 Feb 2024 18:18)  HR: 74 (12 Feb 2024 05:56) (74 - 95)  BP: 120/78 (12 Feb 2024 05:56) (101/64 - 123/87)  BP(mean): --  RR: 18 (12 Feb 2024 05:56) (18 - 19)  SpO2: 100% (12 Feb 2024 05:56) (98% - 100%)    Parameters below as of 12 Feb 2024 05:56  Patient On (Oxygen Delivery Method): room air      CAPILLARY BLOOD GLUCOSE        I&O's Summary    11 Feb 2024 07:01  -  12 Feb 2024 07:00  --------------------------------------------------------  IN: 480 mL / OUT: 0 mL / NET: 480 mL        PHYSICAL EXAM:  CONSTITUTIONAL: NAD,   RESPIRATORY: Normal respiratory effort; lungs are clear to auscultation bilaterally  CARDIOVASCULAR: Regular rate and rhythm, normal S1 and S2, no murmur/rub/gallop; No lower extremity edema; Peripheral pulses are 2+ bilaterally  ABDOMEN: Nontender to palpation, normoactive bowel sounds, no rebound/guarding; No hepatosplenomegaly  MUSCULOSKELETAL:  Normal gait; no clubbing or cyanosis of digits; no joint swelling or tenderness to palpation  PSYCH: A+O to person, place, and time; affect appropriate  NEUROLOGY: CN 2-12 are intact and symmetric; no gross sensory deficits   SKIN: No rashes; no palpable lesions , multiple tattoos +     LABS:        no new labs             RADIOLOGY & ADDITIONAL TESTS:    Imaging Personally Reviewed:    Consultant(s) Notes Reviewed:      Care Discussed with Consultants/Other Providers:

## 2024-02-12 NOTE — BH CONSULTATION LIAISON ASSESSMENT NOTE - RISK ASSESSMENT
Low risk of suicide     Acute- substance use, acute psychosocial stressors, limited supports   Chronic- chronic substance use   Protective- no previous psychiatric hx, no previous SA/SIB, no IP hospitalizations, future-oriented, no current SIIP, no acute depression/psychosis

## 2024-02-12 NOTE — BH CONSULTATION LIAISON ASSESSMENT NOTE - NSBHSAALC_PSY_A_CORE FT
[FreeTextEntry1] : I had the opportunity to see your patient, SAMANTA PRIEST, in consultation for the first time. \par Identification: 3 year boy  \par Chief complaint: Developmental regression.\par History of present illness: He had been evaluated by developmental pediatrics and school system. Diagnosis is autism spectrum disorder. Parents report normal development up to 18 months of age. He was walking since about 12 months. He would use some single specific words and follow instructions. Regression was subsequently noted loss of language and social skills. Stereotypical movements developed. In 2014 he had a self limited febrile convulsion with no focal features. He was seen in ED at [facility]. 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 Negative Fragile X.\par  history: PROM. C section.\par Developmental history: As above.\par Educational history: Speech and DEON at school.\par Medical history: No history of serious head injury or meningoencephalitis. \par Medications: None.\par Allergies: NKDA\par Surgical history: None.\par Sleep history: No sleep concerns are reported. There is no history of snoring or restless sleep. \par Family history: FH of autism or leaning problems denied.\par Social history: Intact family unit. \par Review of systems: See below.\par  
see HPI (reports chronic use of alcohol w/ average of 15-20 drinks/day)

## 2024-02-12 NOTE — BH CONSULTATION LIAISON ASSESSMENT NOTE - NSBHCONSULTFOLLOWAFTERCARE_PSY_A_CORE FT
Patient advised of [facility] (M-F 9am-3pm) at [phone] [place] [address] [phone] which offers crisis psychotherapy, and short-term medication management should patient have a delay in seeing outpatient psychiatrist or need to be seen by a psychiatrist. If patient's family feels that he is risk to self or others they should take him to the ED or call 911. Please provide patient this information upon discharge.

## 2024-02-12 NOTE — PROGRESS NOTE ADULT - PROBLEM SELECTOR PLAN 7
- Diet: Regular  - DVT ppx: SCDs  - Dispo: undomiciled, lives in shelter. PT initially recommending rehab. But patient able to ambulate over 150ft, so no longer needs rehab. Patient not interested in shelter, given a list of substance abuse rehabs, given locations to .   patient stable for discharge    Given Discharge notice on 2/9, undergoing appeals process as patient states that he cannot go to the streets as he will go back to drinking. Needs to go to substance abuse rehab - Diet: Regular  - DVT ppx: SCDs  - Dispo: undomiciled, lives in shelter. PT initially recommending rehab. But patient able to ambulate over 150ft, so no longer needs rehab. Patient not interested in shelter, given a list of substance abuse rehabs, given locations to SW.   patient stable for discharge    Given Discharge notice on 2/9, undergoing appeals process as patient states that he cannot go to the streets as he will go back to drinking. Needs to go to substance abuse rehab  plan of care d/w pt, ACP and SW/CM

## 2024-02-12 NOTE — BH CONSULTATION LIAISON ASSESSMENT NOTE - HPI (INCLUDE ILLNESS QUALITY, SEVERITY, DURATION, TIMING, CONTEXT, MODIFYING FACTORS, ASSOCIATED SIGNS AND SYMPTOMS)
Patient is a 44yo M, undomiciled, non-caregiver (has three children not in  Patient is a 42yo M, undomiciled, unemployed, non-caregiver (has two minor children, in custody of their mother), PPHx of alcohol use disorder, no previous IP hospitalizations, no hx of SA/SIB, denies substance use (with exception of alcohol), not currently engaged in outpatient behavorial health treatment, no significant PMHx, medically admitted for alcohol withdrawal.  Psychiatry consulted for psychiatric clearance for substance use treatment program.    On exam, patient is calm and cooperative. Patient perseverates on chronic neuropathy and dissatisfaction with primary team's work-up of this. Patient reports chronic hx of alcohol use. Patient attributes his alcohol use to chronic pain, which he describes as debilitating and has resulted in occupational decline (states he has been unable to work because of difficulty ambulating). Patient states he uses alcohol to self-medicate the pain, as he does not believe in use of medication. Patient denies psychiatric ROS, including depressive sxs, symptoms of tonya, or symptoms of psychosis. Patient denies current active or passive SIIP/HIIP/AVH. Patient denies previous IP hospitalizations, psychotropic medication trials, hx of SA/SIB. Denies additional substance use. Denies hx of withdrawal seizures, hx of DT, treatment in ICU for acute withdrawal. Reports fair sleep and appetite.     Patient is future-oriented and motivated. States that he wants to get treatment for his alcohol use so that he can more active in his children's life. Patient also state he would like to receive treatment in rehabilitation facility for his chronic pain. Patient states that he has limited supports, as he has alienated most of his family due to his alcohol use.

## 2024-02-12 NOTE — BH CONSULTATION LIAISON ASSESSMENT NOTE - CURRENT MEDICATION
MEDICATIONS  (STANDING):  chlorhexidine 2% Cloths 1 Application(s) Topical daily  chlorhexidine 2% Cloths 1 Application(s) Topical daily  folic acid 1 milliGRAM(s) Oral daily  gabapentin 600 milliGRAM(s) Oral three times a day  influenza   Vaccine 0.5 milliLiter(s) IntraMuscular once  lidocaine   4% Patch 1 Patch Transdermal every 24 hours  lidocaine   4% Patch 1 Patch Transdermal every 24 hours  multivitamin 1 Tablet(s) Oral daily  nicotine - 21 mG/24Hr(s) Patch 1 Patch Transdermal daily  triamcinolone 0.1% Cream 1 Application(s) Topical every 12 hours    MEDICATIONS  (PRN):  acetaminophen     Tablet .. 650 milliGRAM(s) Oral every 6 hours PRN Temp greater or equal to 38C (100.4F), Mild Pain (1 - 3)  aluminum hydroxide/magnesium hydroxide/simethicone Suspension 30 milliLiter(s) Oral every 4 hours PRN Dyspepsia  ibuprofen  Tablet. 400 milliGRAM(s) Oral three times a day PRN Mild Pain (1 - 3)  melatonin 3 milliGRAM(s) Oral at bedtime PRN Insomnia

## 2024-02-12 NOTE — PROGRESS NOTE ADULT - PROBLEM SELECTOR PLAN 4
Sinus tachycardia likely 2/2 acute alcohol withdrawal, however pt also complaining of intermittent chest pain  - s/p IVF   - improved Sinus tachycardia likely 2/2 acute alcohol withdrawal,   - s/p IVF   - improved

## 2024-02-12 NOTE — BH CONSULTATION LIAISON ASSESSMENT NOTE - SUMMARY
Patient is a 42yo M, undomiciled, unemployed, non-caregiver (has two minor children, in custody of their mother), PPHx of alcohol use disorder, no previous IP hospitalizations, no hx of SA/SIB, denies substance use (with exception of alcohol), not currently engaged in outpatient behavorial health treatment, no significant PMHx, medically admitted for alcohol withdrawal.  Psychiatry consulted for psychiatric clearance for substance use treatment program.    There is no overt evidence of acute psychiatric pathology on exam. Patient is not acutely depressed, suicidal/homicidal, manic or psychotic. Patient does not appear behaviorally dysregulated and has been independently maintaining ADLs. Further evaluation can be conducted in outpatient setting to determine potential, chronic underlying symptoms which may be contributory/exacerbate substance use, but primary diagnosis appears to be related to alcohol use. At this time, patient does not meet criteria for inpatient psychiatric hospitalization and there are no contraindications to further treatment in outpatient vs inpatient substance use treatment. Patient will benefit most from treatment of alcohol use disorder (MAT, therapy, etc) and supportive services in the community to address social stressors (homelessness, etc).    PLAN:  1. Routine obs- no indication for CO 1:1 (no SIIP)  2. Medical: as per primary team   3. Psychiatric   - No emergent indication for PRNs or standing medications. Can follow-up outpatient for MAT for alcohol use d/o and further outpatient psychiatric care if necessary.  4. S/W follow-up for SBIRT  5. Dispo: as per primary team, SW f/u for inpatient rehab

## 2024-02-13 PROCEDURE — 99232 SBSQ HOSP IP/OBS MODERATE 35: CPT

## 2024-02-13 RX ORDER — GABAPENTIN 400 MG/1
600 CAPSULE ORAL
Refills: 0 | Status: DISCONTINUED | OUTPATIENT
Start: 2024-02-13 | End: 2024-02-14

## 2024-02-13 RX ORDER — GABAPENTIN 400 MG/1
800 CAPSULE ORAL AT BEDTIME
Refills: 0 | Status: DISCONTINUED | OUTPATIENT
Start: 2024-02-13 | End: 2024-02-14

## 2024-02-13 RX ADMIN — GABAPENTIN 800 MILLIGRAM(S): 400 CAPSULE ORAL at 22:50

## 2024-02-13 RX ADMIN — CHLORHEXIDINE GLUCONATE 1 APPLICATION(S): 213 SOLUTION TOPICAL at 20:10

## 2024-02-13 RX ADMIN — LIDOCAINE 1 PATCH: 4 CREAM TOPICAL at 18:59

## 2024-02-13 RX ADMIN — Medication 1 MILLIGRAM(S): at 12:40

## 2024-02-13 RX ADMIN — Medication 1 APPLICATION(S): at 05:35

## 2024-02-13 RX ADMIN — LIDOCAINE 1 PATCH: 4 CREAM TOPICAL at 07:00

## 2024-02-13 RX ADMIN — Medication 1 PATCH: at 12:40

## 2024-02-13 RX ADMIN — Medication 1 TABLET(S): at 12:40

## 2024-02-13 RX ADMIN — CHLORHEXIDINE GLUCONATE 1 APPLICATION(S): 213 SOLUTION TOPICAL at 12:42

## 2024-02-13 RX ADMIN — Medication 1 PATCH: at 07:00

## 2024-02-13 RX ADMIN — LIDOCAINE 1 PATCH: 4 CREAM TOPICAL at 19:00

## 2024-02-13 RX ADMIN — GABAPENTIN 600 MILLIGRAM(S): 400 CAPSULE ORAL at 12:45

## 2024-02-13 RX ADMIN — Medication 400 MILLIGRAM(S): at 19:01

## 2024-02-13 RX ADMIN — Medication 1 APPLICATION(S): at 18:08

## 2024-02-13 RX ADMIN — GABAPENTIN 600 MILLIGRAM(S): 400 CAPSULE ORAL at 19:00

## 2024-02-13 RX ADMIN — GABAPENTIN 600 MILLIGRAM(S): 400 CAPSULE ORAL at 05:36

## 2024-02-13 NOTE — CHART NOTE - NSCHARTNOTEFT_GEN_A_CORE
[facility] [address] MEDICINE COVERAGE - Medicine Subsequent Hospital Care Note    Notified by RN patient c/o 5/10 left sided chest pain. Patient was seen and assessed at bedside. Patient is sitting upright in bed in no acute distress. Patient reports left sided chest pain that occurs at random, he states he was laying in bed relaxing at the time of symptom onset. He shares he has had this chest pain for about 3 months, but states it has been worsening recently. Patient also endorses pain in b/l feet, but states these symptoms are also chronic. Patient received Tylenol which slightly alleviated his symptoms. He denies any palpitations, nausea, vomiting, or abdominal pain.      Vital Signs Last 24 Hrs  T(C): 36.9 (01-17-24 @ 22:00), Max: 37 (01-17-24 @ 04:40)  T(F): 98.5 (01-17-24 @ 22:00), Max: 98.6 (01-17-24 @ 04:40)  HR: 97 (01-17-24 @ 22:00) (67 - 97)  BP: 128/88 (01-17-24 @ 22:00) (128/88 - 147/84)  RR: 18 (01-17-24 @ 22:00) (12 - 18)  SpO2: 100% (01-17-24 @ 22:00) (98% - 100%) on (O2)    PHYSICAL EXAM:  Constitutional: NAD, comfortably sitting upright in bed.  Respiratory: Clear to auscultation bilaterally. No wheezes, rales or rhonchi. Normal respiratory effort.  Cardiovascular: regular rate and rhythm, S1 and S2, no murmurs, rubs or gallops.  Gastrointestinal: soft, nontender, nondistended.   Skin: warm, dry, no rash  Musculoskeletal: no clubbing, no cyanosis, no joint swelling        LABS:                        12.1   5.27  )-----------( 131      ( 17 Jan 2024 06:10 )             35.1     01-17    136  |  99  |  7   ----------------------------<  76  3.5   |  24  |  0.58    Ca    8.7      17 Jan 2024 06:10  Phos  2.7     01-16  Mg     1.80     01-17            ASSESSMENT:    This is a 43 year old male with past medical history of ETOH abuse who presented to the ED intoxicated and subsequently with alcohol withdrawal and chest pain. Per chart review, patient drinks 15-20 drinks per day and has history of withdrawal. He was started on high risk Ativan taper. Patient was seen overnight for left sided chest pain which he was admitted with. Patient was seen and assessed at bedside. All VSS and patient laying comfortably in bed in no acute distress. Reports Tylenol slightly alleviated his symptoms.      PLAN:  -EKG with normal sinus rhythm, unchanged from prior.   -Per review of labs, troponin negative x2. STAT cardiac enzymes ordered, will f/u results.  -TTE pending.  -Will continue tele monitoring.  -On 1/17, patient began trial of gabapentin for suspected neuropathic pain of the b/l lower extremities.  -Will continue to monitor closely.         Susan Medina PA-C  Department of Medicine, p. [phone]
Source: Patient [x]   other [x] medical chart   Diet rx: Regular: Supplement Feeding Modality:  Oral    Ensure Plus High Protein Cans or Servings Per Day:  2       Frequency:  Daily (01-18-24 @ 13:50) [Active]    Pt 44 yo male with PMHx of Drug abuse (THC, Cocaine), ETOH abuse (drinks 15-20 drinks daily) c/b alcohol withdrawal p/w alcohol withdrawal and chest pain - per chart review.   At time of visit, Pt awake, alert. Per Pt, his appetite/PO intake good. No report of chewing or swallowing difficulty; no report of vomiting or diarrhea @ this time. +Last BM (1/31), per Pt. Pt likes to drink PO supplement: Ensure Plus, reported. Food preferences discussed with Pt as well. RD answered Pt's concerns related to diet. RD remains available, Pt made aware.     Pt's height: 71" (1/18)     IBW: 172#+/-10%    Pt's weight: 143#, per Pt   Pertinent Medications: Folic Acid,   Pertinent Labs: (1/25) phosphorus 5.3 H;   (1/19) Albumin 4.0, AST 51 H;     (1/16) HbA1c 5.0%     Skin: no report of pressure injury at present     Estimated Needs: [x] no change since previous assessment  Previous Nutrition Diagnosis: [x] Unintended Weight Loss  New Nutrition Diagnosis: [x] not applicable    Nutrition Interventions/Recommendations:  1. Continue PO diet as ordered; Assist Pt with meals as needed;   2. Monitor PO diet tolerance; Honor food preferences;   3. Add Multivitamins 1 tab daily for micronutrient coverage;   4. Monitor labs, weekly weights, hydration status;
Patient Chandler Brandon requires to have rolling walker due to have diagnosis of neuropathy and alcohol withdraw secondary to alcohol intoxication to help him complete MRADL at home.     Thank you     OLAF TamC  Department of Medicine- [facility]  In House Pager #[phone]

## 2024-02-13 NOTE — PROGRESS NOTE ADULT - PROBLEM SELECTOR PLAN 2
Chronic neuropathic LE discomfort, suspect in setting of chronic long standing ETOH abuse.   - B12 nl  - c/w gabapentin to 600 TID, patient reports positive response  - standing lidocaine patches as patient reports positive response  - patient re-evaluated by PT and patient able to ambulate 150ft, so no longer needs ELVIS. SW to discuss shelter options with patient; patient not interested in shelter  - patient given a list of substance abuse rehabs, picked 3 facilities, SW to assess availability   - patient medically stable for discharge Chronic neuropathic LE discomfort, suspect in setting of chronic long standing ETOH abuse.   - B12 nl  - on gabapentin to 600 TID, patient reports positive response, pt says neuropathy worse at night , better in daytime , can consider increasing gabapentin to 800 mg QHS, c/w 600 mg BID   - standing lidocaine patches as patient reports positive response  - patient re-evaluated by PT and patient able to ambulate 150ft, so no longer needs ELVIS. SW to discuss shelter options with patient; patient not interested in shelter  - patient given a list of substance abuse rehabs, [facility] 3 facilities, SW to assess availability   - patient medically stable for discharge

## 2024-02-13 NOTE — PROGRESS NOTE ADULT - SUBJECTIVE AND OBJECTIVE BOX
Patient is a 43y old  Male who presents with a chief complaint of Chest pain, alcohol withdrawal (12 Feb 2024 10:06)      SUBJECTIVE / OVERNIGHT EVENTS:    MEDICATIONS  (STANDING):  chlorhexidine 2% Cloths 1 Application(s) Topical daily  chlorhexidine 2% Cloths 1 Application(s) Topical daily  folic acid 1 milliGRAM(s) Oral daily  gabapentin 600 milliGRAM(s) Oral three times a day  influenza   Vaccine 0.5 milliLiter(s) IntraMuscular once  lidocaine   4% Patch 1 Patch Transdermal every 24 hours  lidocaine   4% Patch 1 Patch Transdermal every 24 hours  multivitamin 1 Tablet(s) Oral daily  nicotine - 21 mG/24Hr(s) Patch 1 Patch Transdermal daily  triamcinolone 0.1% Cream 1 Application(s) Topical every 12 hours    MEDICATIONS  (PRN):  acetaminophen     Tablet .. 650 milliGRAM(s) Oral every 6 hours PRN Temp greater or equal to 38C (100.4F), Mild Pain (1 - 3)  aluminum hydroxide/magnesium hydroxide/simethicone Suspension 30 milliLiter(s) Oral every 4 hours PRN Dyspepsia  ibuprofen  Tablet. 400 milliGRAM(s) Oral three times a day PRN Mild Pain (1 - 3)  melatonin 3 milliGRAM(s) Oral at bedtime PRN Insomnia      Vital Signs Last 24 Hrs  T(C): 37.2 (13 Feb 2024 05:33), Max: 37.3 (12 Feb 2024 18:26)  T(F): 98.9 (13 Feb 2024 05:33), Max: 99.1 (12 Feb 2024 18:26)  HR: 100 (13 Feb 2024 05:33) (86 - 100)  BP: 103/71 (13 Feb 2024 05:33) (103/71 - 117/76)  BP(mean): --  RR: 18 (13 Feb 2024 05:33) (18 - 18)  SpO2: 100% (13 Feb 2024 05:33) (96% - 100%)    Parameters below as of 13 Feb 2024 05:33  Patient On (Oxygen Delivery Method): room air      CAPILLARY BLOOD GLUCOSE        I&O's Summary    12 Feb 2024 07:01  -  13 Feb 2024 07:00  --------------------------------------------------------  IN: 980 mL / OUT: 0 mL / NET: 980 mL        PHYSICAL EXAM:  GENERAL: NAD, well-developed  HEAD:  Atraumatic, Normocephalic  EYES: EOMI, PERRLA, conjunctiva and sclera clear  NECK: Supple, No JVD  CHEST/LUNG: Clear to auscultation bilaterally; No wheeze  HEART: Regular rate and rhythm; No murmurs, rubs, or gallops  ABDOMEN: Soft, Nontender, Nondistended; Bowel sounds present  EXTREMITIES:  2+ Peripheral Pulses, No clubbing, cyanosis, or edema  PSYCH: AAOx3  NEUROLOGY: non-focal  SKIN: No rashes or lesions    LABS:                    RADIOLOGY & ADDITIONAL TESTS:    Imaging Personally Reviewed:    Consultant(s) Notes Reviewed:      Care Discussed with Consultants/Other Providers:   Patient is a 43y old  Male who presents with a chief complaint of Chest pain, alcohol withdrawal (12 Feb 2024 10:06)      SUBJECTIVE / OVERNIGHT EVENTS: patient seen and examined by bedside, pt alert and awake , still having pain in his feet , pain is worse at night , better in the day, , Pt also c/o pain in the rt knee which is chronic as he had been putting more weight on RLE as hie left leg had been weaker than right     MEDICATIONS  (STANDING):  chlorhexidine 2% Cloths 1 Application(s) Topical daily  chlorhexidine 2% Cloths 1 Application(s) Topical daily  folic acid 1 milliGRAM(s) Oral daily  gabapentin 600 milliGRAM(s) Oral three times a day  influenza   Vaccine 0.5 milliLiter(s) IntraMuscular once  lidocaine   4% Patch 1 Patch Transdermal every 24 hours  lidocaine   4% Patch 1 Patch Transdermal every 24 hours  multivitamin 1 Tablet(s) Oral daily  nicotine - 21 mG/24Hr(s) Patch 1 Patch Transdermal daily  triamcinolone 0.1% Cream 1 Application(s) Topical every 12 hours    MEDICATIONS  (PRN):  acetaminophen     Tablet .. 650 milliGRAM(s) Oral every 6 hours PRN Temp greater or equal to 38C (100.4F), Mild Pain (1 - 3)  aluminum hydroxide/magnesium hydroxide/simethicone Suspension 30 milliLiter(s) Oral every 4 hours PRN Dyspepsia  ibuprofen  Tablet. 400 milliGRAM(s) Oral three times a day PRN Mild Pain (1 - 3)  melatonin 3 milliGRAM(s) Oral at bedtime PRN Insomnia      Vital Signs Last 24 Hrs  T(C): 37.2 (13 Feb 2024 05:33), Max: 37.3 (12 Feb 2024 18:26)  T(F): 98.9 (13 Feb 2024 05:33), Max: 99.1 (12 Feb 2024 18:26)  HR: 100 (13 Feb 2024 05:33) (86 - 100)  BP: 103/71 (13 Feb 2024 05:33) (103/71 - 117/76)  BP(mean): --  RR: 18 (13 Feb 2024 05:33) (18 - 18)  SpO2: 100% (13 Feb 2024 05:33) (96% - 100%)    Parameters below as of 13 Feb 2024 05:33  Patient On (Oxygen Delivery Method): room air      CAPILLARY BLOOD GLUCOSE        I&O's Summary    12 Feb 2024 07:01  -  13 Feb 2024 07:00  --------------------------------------------------------  IN: 980 mL / OUT: 0 mL / NET: 980 mL        PHYSICAL EXAM:  CONSTITUTIONAL: NAD,   RESPIRATORY: Normal respiratory effort; lungs are clear to auscultation bilaterally  CARDIOVASCULAR: Regular rate and rhythm, normal S1 and S2, no murmur/rub/gallop; No lower extremity edema; Peripheral pulses are 2+ bilaterally  ABDOMEN: Nontender to palpation, normoactive bowel sounds, no rebound/guarding; No hepatosplenomegaly  MUSCULOSKELETAL:  Normal gait; no clubbing or cyanosis of digits; no joint swelling or tenderness to palpation  PSYCH: A+O to person, place, and time; affect appropriate  NEUROLOGY: CN 2-12 are intact and symmetric; no gross sensory deficits   SKIN: No rashes; no palpable lesions , multiple tattoos +         LABS:              no new labs       RADIOLOGY & ADDITIONAL TESTS:    Imaging Personally Reviewed:    Consultant(s) Notes Reviewed:      Care Discussed with Consultants/Other Providers:

## 2024-02-13 NOTE — PROGRESS NOTE ADULT - PROBLEM SELECTOR PLAN 7
- Diet: Regular  - DVT ppx: SCDs  - Dispo: undomiciled, lives in shelter. PT initially recommending rehab. But patient able to ambulate over 150ft, so no longer needs rehab. Patient not interested in shelter, given a list of substance abuse rehabs, given locations to SW.   patient stable for discharge    Given Discharge notice on 2/9, undergoing appeals process as patient states that he cannot go to the streets as he will go back to drinking. Needs to go to substance abuse rehab  plan of care d/w pt, ACP and SW/CM

## 2024-02-14 VITALS
SYSTOLIC BLOOD PRESSURE: 128 MMHG | DIASTOLIC BLOOD PRESSURE: 85 MMHG | OXYGEN SATURATION: 97 % | RESPIRATION RATE: 18 BRPM | TEMPERATURE: 98 F | HEART RATE: 74 BPM

## 2024-02-14 PROCEDURE — 99239 HOSP IP/OBS DSCHRG MGMT >30: CPT

## 2024-02-14 RX ORDER — TRAMADOL HYDROCHLORIDE 50 MG/1
25 TABLET ORAL ONCE
Refills: 0 | Status: DISCONTINUED | OUTPATIENT
Start: 2024-02-14 | End: 2024-02-14

## 2024-02-14 RX ADMIN — GABAPENTIN 600 MILLIGRAM(S): 400 CAPSULE ORAL at 05:44

## 2024-02-14 RX ADMIN — Medication 1 APPLICATION(S): at 05:45

## 2024-02-14 RX ADMIN — TRAMADOL HYDROCHLORIDE 25 MILLIGRAM(S): 50 TABLET ORAL at 01:20

## 2024-02-14 RX ADMIN — LIDOCAINE 1 PATCH: 4 CREAM TOPICAL at 07:51

## 2024-02-14 RX ADMIN — CHLORHEXIDINE GLUCONATE 1 APPLICATION(S): 213 SOLUTION TOPICAL at 12:10

## 2024-02-14 RX ADMIN — Medication 1 MILLIGRAM(S): at 12:07

## 2024-02-14 RX ADMIN — Medication 1 PATCH: at 12:00

## 2024-02-14 RX ADMIN — LIDOCAINE 1 PATCH: 4 CREAM TOPICAL at 06:11

## 2024-02-14 RX ADMIN — Medication 1 PATCH: at 12:07

## 2024-02-14 RX ADMIN — Medication 1 TABLET(S): at 12:07

## 2024-02-14 RX ADMIN — TRAMADOL HYDROCHLORIDE 25 MILLIGRAM(S): 50 TABLET ORAL at 00:50

## 2024-02-14 RX ADMIN — CHLORHEXIDINE GLUCONATE 1 APPLICATION(S): 213 SOLUTION TOPICAL at 12:11

## 2024-02-14 NOTE — PROGRESS NOTE ADULT - SUBJECTIVE AND OBJECTIVE BOX
Patient is a 43y old  Male who presents with a chief complaint of Chest pain, alcohol withdrawal (13 Feb 2024 08:20)      SUBJECTIVE / OVERNIGHT EVENTS:    MEDICATIONS  (STANDING):  chlorhexidine 2% Cloths 1 Application(s) Topical daily  chlorhexidine 2% Cloths 1 Application(s) Topical daily  folic acid 1 milliGRAM(s) Oral daily  gabapentin 600 milliGRAM(s) Oral two times a day  gabapentin 800 milliGRAM(s) Oral at bedtime  influenza   Vaccine 0.5 milliLiter(s) IntraMuscular once  lidocaine   4% Patch 1 Patch Transdermal every 24 hours  lidocaine   4% Patch 1 Patch Transdermal every 24 hours  multivitamin 1 Tablet(s) Oral daily  nicotine - 21 mG/24Hr(s) Patch 1 Patch Transdermal daily  triamcinolone 0.1% Cream 1 Application(s) Topical every 12 hours    MEDICATIONS  (PRN):  acetaminophen     Tablet .. 650 milliGRAM(s) Oral every 6 hours PRN Temp greater or equal to 38C (100.4F), Mild Pain (1 - 3)  aluminum hydroxide/magnesium hydroxide/simethicone Suspension 30 milliLiter(s) Oral every 4 hours PRN Dyspepsia  ibuprofen  Tablet. 400 milliGRAM(s) Oral three times a day PRN Mild Pain (1 - 3)  melatonin 3 milliGRAM(s) Oral at bedtime PRN Insomnia      Vital Signs Last 24 Hrs  T(C): 36.7 (14 Feb 2024 05:39), Max: 37.3 (13 Feb 2024 17:11)  T(F): 98.1 (14 Feb 2024 05:39), Max: 99.2 (13 Feb 2024 17:11)  HR: 74 (14 Feb 2024 05:39) (74 - 87)  BP: 108/70 (14 Feb 2024 05:39) (88/55 - 118/86)  BP(mean): --  RR: 18 (14 Feb 2024 05:39) (18 - 19)  SpO2: 100% (14 Feb 2024 05:39) (97% - 100%)    Parameters below as of 14 Feb 2024 05:39  Patient On (Oxygen Delivery Method): room air      CAPILLARY BLOOD GLUCOSE        I&O's Summary    13 Feb 2024 07:01  -  14 Feb 2024 07:00  --------------------------------------------------------  IN: 1040 mL / OUT: 0 mL / NET: 1040 mL        PHYSICAL EXAM:  GENERAL: NAD, well-developed  HEAD:  Atraumatic, Normocephalic  EYES: EOMI, PERRLA, conjunctiva and sclera clear  NECK: Supple, No JVD  CHEST/LUNG: Clear to auscultation bilaterally; No wheeze  HEART: Regular rate and rhythm; No murmurs, rubs, or gallops  ABDOMEN: Soft, Nontender, Nondistended; Bowel sounds present  EXTREMITIES:  2+ Peripheral Pulses, No clubbing, cyanosis, or edema  PSYCH: AAOx3  NEUROLOGY: non-focal  SKIN: No rashes or lesions    LABS:                    RADIOLOGY & ADDITIONAL TESTS:    Imaging Personally Reviewed:    Consultant(s) Notes Reviewed:      Care Discussed with Consultants/Other Providers:   Patient is a 43y old  Male who presents with a chief complaint of Chest pain, alcohol withdrawal (13 Feb 2024 08:20)      SUBJECTIVE / OVERNIGHT EVENTS: patient seen and examined by bedside, pt feeling better, denies headache, dizziness, SOB, CP, Palpitations , N/V/D, abdominal pain  pt agreeable to go home as lost appeal     MEDICATIONS  (STANDING):  chlorhexidine 2% Cloths 1 Application(s) Topical daily  chlorhexidine 2% Cloths 1 Application(s) Topical daily  folic acid 1 milliGRAM(s) Oral daily  gabapentin 600 milliGRAM(s) Oral two times a day  gabapentin 800 milliGRAM(s) Oral at bedtime  influenza   Vaccine 0.5 milliLiter(s) IntraMuscular once  lidocaine   4% Patch 1 Patch Transdermal every 24 hours  lidocaine   4% Patch 1 Patch Transdermal every 24 hours  multivitamin 1 Tablet(s) Oral daily  nicotine - 21 mG/24Hr(s) Patch 1 Patch Transdermal daily  triamcinolone 0.1% Cream 1 Application(s) Topical every 12 hours    MEDICATIONS  (PRN):  acetaminophen     Tablet .. 650 milliGRAM(s) Oral every 6 hours PRN Temp greater or equal to 38C (100.4F), Mild Pain (1 - 3)  aluminum hydroxide/magnesium hydroxide/simethicone Suspension 30 milliLiter(s) Oral every 4 hours PRN Dyspepsia  ibuprofen  Tablet. 400 milliGRAM(s) Oral three times a day PRN Mild Pain (1 - 3)  melatonin 3 milliGRAM(s) Oral at bedtime PRN Insomnia      Vital Signs Last 24 Hrs  T(C): 36.7 (14 Feb 2024 05:39), Max: 37.3 (13 Feb 2024 17:11)  T(F): 98.1 (14 Feb 2024 05:39), Max: 99.2 (13 Feb 2024 17:11)  HR: 74 (14 Feb 2024 05:39) (74 - 87)  BP: 108/70 (14 Feb 2024 05:39) (88/55 - 118/86)  BP(mean): --  RR: 18 (14 Feb 2024 05:39) (18 - 19)  SpO2: 100% (14 Feb 2024 05:39) (97% - 100%)    Parameters below as of 14 Feb 2024 05:39  Patient On (Oxygen Delivery Method): room air      CAPILLARY BLOOD GLUCOSE        I&O's Summary    13 Feb 2024 07:01  -  14 Feb 2024 07:00  --------------------------------------------------------  IN: 1040 mL / OUT: 0 mL / NET: 1040 mL      PHYSICAL EXAM:  CONSTITUTIONAL: NAD,   RESPIRATORY: Normal respiratory effort; lungs are clear to auscultation bilaterally  CARDIOVASCULAR: Regular rate and rhythm, normal S1 and S2, no murmur/rub/gallop; No lower extremity edema; Peripheral pulses are 2+ bilaterally  ABDOMEN: Nontender to palpation, normoactive bowel sounds, no rebound/guarding; No hepatosplenomegaly  MUSCULOSKELETAL:  Normal gait; no clubbing or cyanosis of digits; no joint swelling or tenderness to palpation  PSYCH: A+O to person, place, and time; affect appropriate  NEUROLOGY: CN 2-12 are intact and symmetric; no gross sensory deficits   SKIN: No rashes; no palpable lesions , multiple tattoos +         LABS:              no new labs       RADIOLOGY & ADDITIONAL TESTS:    Imaging Personally Reviewed:    Consultant(s) Notes Reviewed:      Care Discussed with Consultants/Other Providers:

## 2024-02-14 NOTE — PROGRESS NOTE ADULT - PROBLEM SELECTOR PROBLEM 4
Sinus tachycardia
Transaminitis
Transaminitis
Atypical chest pain
Sinus tachycardia
Transaminitis
Atypical chest pain
Sinus tachycardia
Transaminitis
Atypical chest pain
Transaminitis
Sinus tachycardia
Transaminitis
Atypical chest pain
Sinus tachycardia

## 2024-02-14 NOTE — PROGRESS NOTE ADULT - PROBLEM SELECTOR PLAN 7
- Diet: Regular  - DVT ppx: SCDs  - Dispo: undomiciled, lives in shelter. PT initially recommending rehab. But patient able to ambulate over 150ft, so no longer needs rehab. Patient not interested in shelter, given a list of substance abuse rehabs, given locations to SW.   patient stable for discharge    Given Discharge notice on 2/9, undergoing appeals process as patient states that he cannot go to the streets as he will go back to drinking. Needs to go to substance abuse rehab  plan of care d/w pt, ACP and SW/CM - Diet: Regular  - DVT ppx: SCDs  - Dispo: undomiciled, lives in shelter. PT initially recommending rehab. But patient able to ambulate over 150ft, so no longer needs rehab. Patient not interested in shelter, given a list of substance abuse rehabs, given locations to SW.   patient stable for discharge    Given Discharge notice on 2/9, lost appeal    dc home today, pt plan to do inpatient alcohol rehab   Patient hemodynamically stable for discharge home  Time spent in discharge process is 32 min    plan of care d/w pt, ACP and SW/CM

## 2024-02-14 NOTE — PROGRESS NOTE ADULT - PROBLEM SELECTOR PLAN 6
- Diet: Regular  - DVT ppx: SCDs  - Dispo: undomiciled, lives in shelter. PT recommending rehab. Peer to peer requested by insurance company, completed 1/25, requesting updated PT evaluation and documentation along with OT consult - requested, follow up recs and updated insurance decision. Awaiting update.
- Diet: Regular  - DVT ppx: SCDs  - Dispo: undomiciled, lives in shelter. PT recommending rehab. Peer to peer requested by insurance company, completed 1/25, requesting updated PT evaluation and documentation along with OT consult - requested, follow up recs and updated insurance decision. Awaiting update.
Elevated in setting of alcohol use  - Improving  - Hepatitis panel negative
Elevated in setting of alcohol use  - Improving  - Hepatitis panel negative
- Diet: Regular  - DVT ppx: SCDs  - Dispo: undomiciled, lives in shelter. PT recommending rehab. Peer to peer requested by insurance company, completed 1/25, requesting updated PT evaluation and documentation along with OT consult - requested, follow up recs and updated insurance decision. Awaiting update.
Elevated in setting of alcohol use  - Improving  - Hepatitis panel negative
Elevated in setting of alcohol use  - Improving  - Hepatitis panel neg
Elevated in setting of alcohol use  - Improving  - Hepatitis panel negative
Elevated in setting of alcohol use  - Improving  - Hepatitis panel neg
Elevated in setting of alcohol use  - Improving  - Hepatitis panel neg
- Diet: Regular  - DVT ppx: SCDs  - Dispo: undomiciled, lives in shelter. PT recommending rehab. Peer to peer requested by insurance company, completed 1/25, requesting updated PT evaluation and documentation along with OT consult - requested, follow up recs and updated insurance decision. Awaiting update.
Elevated in setting of alcohol use  - Improving  - Hepatitis panel negative
Elevated in setting of alcohol use  - Improving  - Hepatitis panel neg

## 2024-02-14 NOTE — PROGRESS NOTE ADULT - PROBLEM SELECTOR PROBLEM 6
Transaminitis
Need for prophylactic measure
Transaminitis
Need for prophylactic measure
Transaminitis

## 2024-02-14 NOTE — PROGRESS NOTE ADULT - PROVIDER SPECIALTY LIST ADULT
Hospitalist
Internal Medicine
Internal Medicine
Hospitalist
Internal Medicine
Hospitalist
Internal Medicine
Hospitalist
Internal Medicine
Hospitalist
Internal Medicine
Hospitalist
Internal Medicine
Hospitalist

## 2024-02-14 NOTE — PROGRESS NOTE ADULT - PROBLEM SELECTOR PROBLEM 2
Sinus tachycardia
Sinus tachycardia
Neuropathy
Sinus tachycardia
Neuropathy
Sinus tachycardia
Neuropathy
Sinus tachycardia
Neuropathy
Neuropathy
Sinus tachycardia
Neuropathy
Sinus tachycardia
Neuropathy
Neuropathy
Sinus tachycardia
Neuropathy
Sinus tachycardia
Neuropathy

## 2024-02-14 NOTE — PROGRESS NOTE ADULT - PROBLEM SELECTOR PLAN 5
- Diet: Regular  - DVT ppx: SCDs  - Dispo: undomiciled, lives in shelter. PT recommending rehab. Peer to peer requested by insurance company, completed 1/25, requesting updated PT evaluation and documentation along with OT consult - requested, follow up recs and updated insurance decision. Awaiting update.    #Chronic neuropathic LE discomfort, suspect in setting of chronic long standing etoh abuse. B12 not low. Uptitrated gabapentin to 200 TID, patient reports positive response since dose change, trial lido patch PRN, outpatient follow up for further titration and management.
DVT ppx: low risk, SCDs  Diet: Regular  Dispo: undomiciled, lives in shelter
resolved  - Troponin negative x3  - TTE: left ventricular systolic function is normal with an ejection fraction of 54%  - Would not perform stress test while actively withdrawing. Also pain resolved. Low suspicion for cardiac at this time. Can consider outpatient eval.
- Diet: Regular  - DVT ppx: SCDs  - Dispo: undomiciled, lives in shelter
resolved  - Troponin negative x3  - TTE: left ventricular systolic function is normal with an ejection fraction of 54%  - Would not perform stress test while actively withdrawing. Also pain resolved. Low suspicion for cardiac at this time. Can consider outpatient eval.
- Diet: Regular  - DVT ppx: SCDs  - Dispo: undomiciled, lives in shelter. PT recommending rehab. Peer to peer requested by insurance company, completed 1/25, requesting updated PT evaluation and documentation along with OT consult - requested, follow up recs and updated insurance decision.    #Chronic neuropathic LE discomfort, suspect in setting of chronic long standing etoh abuse. B12 not low. Uptitrated gabapentin to 200 TID, patient reports positive response since dose change, outpatient follow up for further titration and management.
Elevated in setting of alcohol use  - Improving  - Hepatitis panel neg
resolved  - Troponin negative x3  - TTE: left ventricular systolic function is normal with an ejection fraction of 54%  - Would not perform stress test while actively withdrawing. Also pain resolved. Low suspicion for cardiac at this time. Can consider outpatient eval.
- Diet: Regular  - DVT ppx: SCDs  - Dispo: undomiciled, lives in shelter    #Chronic neuropathic LE discomfort, suspect in setting of chronic long standing etoh abuse. B12 not low. Uptitrated gabapentin to 200 TID, monitor.
resolved  - Troponin negative x3  - TTE: left ventricular systolic function is normal with an ejection fraction of 54%  - Would not perform stress test while actively withdrawing. Also pain resolved. Low suspicion for cardiac at this time. Can consider outpatient eval.
resolved  - Troponin negative x3  - TTE: left ventricular systolic function is normal with an ejection fraction of 54%  - Would not perform stress test while actively withdrawing. Also pain resolved. Low suspicion for cardiac at this time. Can consider outpatient eval.
- Diet: Regular  - DVT ppx: SCDs  - Dispo: undomiciled, lives in shelter. PT recommending rehab. Peer to peer requested by insurance company, completed 1/25, requesting updated PT evaluation and documentation along with OT consult - requested, follow up recs and updated insurance decision. Awaiting update.    #Chronic neuropathic LE discomfort, suspect in setting of chronic long standing etoh abuse. B12 not low. Uptitrated gabapentin to 200 TID, patient reports positive response since dose change, trial lido patch, outpatient follow up for further titration and management.
- Diet: Regular  - DVT ppx: SCDs  - Dispo: undomiciled, lives in shelter. PT recommending rehab. Peer to peer requested by insurance company, completed 1/25, requesting updated PT evaluation and documentation along with OT consult if able.     #Chronic neuropathic LE discomfort, suspect in setting of chronic long standing etoh abuse. B12 not low. Uptitrated gabapentin to 200 TID, outpatient follow up for further titration and management.
resolved  - Troponin negative x3  - TTE: left ventricular systolic function is normal with an ejection fraction of 54%  - Would not perform stress test while actively withdrawing. Also pain resolved. Low suspicion for cardiac at this time. Can consider outpatient eval.
DVT ppx: low risk, SCDs  Diet: Regular  Dispo: undomiciled, lives in shelter  Hypokalemia- replete k    Plan discussed with ACP
resolved  - Troponin negative x3  - TTE: left ventricular systolic function is normal with an ejection fraction of 54%  - Would not perform stress test while actively withdrawing. Also pain resolved. Low suspicion for cardiac at this time. Can consider outpatient eval.
- Diet: Regular  - DVT ppx: SCDs  - Dispo: undomiciled, lives in shelter    #Chronic neuropathic LE discomfort, suspect in setting of chronic long standing etoh abuse. B12 not low. Uptitrate gabapentin to 200 TID, monitor.
DVT ppx: low risk, SCDs  Diet: Regular  Dispo: undomiciled, lives in shelter
- Diet: Regular  - DVT ppx: SCDs  - Dispo: undomiciled, lives in shelter    #Chronic neuropathic LE discomfort, suspect in setting of chronic long standing etoh abuse. B12 not low. Uptitrated gabapentin to 200 TID, outpatient follow up for further titration and management.
resolved  - Troponin negative x3  - TTE: left ventricular systolic function is normal with an ejection fraction of 54%  - Would not perform stress test while actively withdrawing. Also pain resolved. Low suspicion for cardiac at this time. Can consider outpatient eval.
- Diet: Regular  - DVT ppx: SCDs  - Dispo: undomiciled, lives in shelter
DVT ppx: low risk, SCDs  Diet: Regular  Dispo: undomiciled, lives in shelter    Plan discussed with ACP
resolved  - Troponin negative x3  - TTE: left ventricular systolic function is normal with an ejection fraction of 54%  - Would not perform stress test while actively withdrawing. Also pain resolved. Low suspicion for cardiac at this time. Can consider outpatient eval.
Elevated in setting of alcohol use  - Improving  - Hepatitis panel neg

## 2024-02-14 NOTE — PROGRESS NOTE ADULT - REASON FOR ADMISSION
Chest pain, alcohol withdrawal

## 2024-02-14 NOTE — PROGRESS NOTE ADULT - PROBLEM SELECTOR PROBLEM 1
Alcohol dependence with withdrawal

## 2024-02-14 NOTE — PROGRESS NOTE ADULT - PROBLEM SELECTOR PLAN 3
2/2 patient complained of L buttock pain. skin exam with small < 1cm x 1cm area of induration without fluctuance or erythema in the gluteal fold on the L buttock  - no area of fluctuance that would be amenable to drainage
resolved  - Troponin negative x3  - TTE: left ventricular systolic function is normal with an ejection fraction of 54%  - Would not perform stress test while actively withdrawing. Also pain resolved. Low suspicion for cardiac at this time. Can consider outpatient eval.
resolved  - Troponin negative x3  - TTE: left ventricular systolic function is normal with an ejection fraction of 54%  - Would not perform stress test while actively withdrawing. Can consider outpatient now that pain resolved.
Sinus tachycardia likely 2/2 acute alcohol withdrawal, however pt also complaining of intermittent chest pain  - s/p IVF   - urine tox positive for THC and Benzos  - improved
resolved  - Troponin negative x3  - TTE: left ventricular systolic function is normal with an ejection fraction of 54%  - Would not perform stress test while actively withdrawing. Also pain resolved. Low suspicion for cardiac at this time. Can consider outpatient eval.
Pt reports intermittent left sided chest pain though will not describe it further  - Troponin negative x3  - TTE: left ventricular systolic function is normal with an ejection fraction of 54%  - Would not perform stress test while actively withdrawing. Can consider outpatient
Pt reports intermittent left sided chest pain though will not describe it further  - Troponin negative x3  - TTE: left ventricular systolic function is normal with an ejection fraction of 54%  - Would not perform stress test while actively withdrawing. Can consider outpatient
2/2 patient complained of L buttock pain. skin exam with small < 1cm x 1cm area of induration without fluctuance or erythema in the gluteal fold on the L buttock  - no area of fluctuance that would be amenable to drainage
2/2 patient complained of L buttock pain. skin exam with small < 1cm x 1cm area of induration without fluctuance or erythema in the gluteal fold on the L buttock  - no area of fluctuance that would be amenable to drainage
Sinus tachycardia likely 2/2 acute alcohol withdrawal, however pt also complaining of intermittent chest pain  - s/p IVF   - urine tox positive for THC and Benzos  - improved
resolved  - Troponin negative x3  - TTE: left ventricular systolic function is normal with an ejection fraction of 54%  - Would not perform stress test while actively withdrawing. Also pain resolved. Low suspicion for cardiac at this time. Can consider outpatient eval.
2/2 patient complained of L buttock pain. skin exam with small < 1cm x 1cm area of induration without fluctuance or erythema in the gluteal fold on the L buttock  - will monitor for now and treat with ibuprofen, if still complaining of pain tomorrow may consider starting bactrim  - no area of fluctuance that would be amenable to drainage
2/2 patient complained of L buttock pain. skin exam with small < 1cm x 1cm area of induration without fluctuance or erythema in the gluteal fold on the L buttock  - no area of fluctuance that would be amenable to drainage
Pt reports intermittent left sided chest pain though will not describe it further  - troponin negative x2   - check TTE  -  now and 81 daily  - would not perform stress test while actively withdrawing  - continue tele monitoring  - f/u RVP [ neg]
Pt reports intermittent left sided chest pain though will not describe it further  - troponin negative x3  - check TTE  - ASA 81 daily  - would not perform stress test while actively withdrawing  - continue tele monitoring  - RVP [ neg]
2/2 patient complained of L buttock pain. skin exam with small < 1cm x 1cm area of induration without fluctuance or erythema in the gluteal fold on the L buttock  - will monitor for now and treat with ibuprofen, if still complaining of pain tomorrow may consider starting bactrim  - no area of fluctuance that would be amenable to drainage
2/2 patient complained of L buttock pain. skin exam with small < 1cm x 1cm area of induration without fluctuance or erythema in the gluteal fold on the L buttock  - will monitor for now and treat with ibuprofen, if still complaining of pain tomorrow may consider starting bactrim  - no area of fluctuance that would be amenable to drainage
Pt reports intermittent left sided chest pain though will not describe it further  - troponin negative x2   - check TTE  -  now and 81 daily  - would not perform stress test while actively withdrawing  - continue tele monitoring  - f/u RVP [ ]
resolved  - Troponin negative x3  - TTE: left ventricular systolic function is normal with an ejection fraction of 54%  - Would not perform stress test while actively withdrawing. Also pain resolved. Low suspicion for cardiac at this time. Can consider outpatient eval.
2/2 patient complained of L buttock pain. skin exam with small < 1cm x 1cm area of induration without fluctuance or erythema in the gluteal fold on the L buttock  - no area of fluctuance that would be amenable to drainage
resolved  - Troponin negative x3  - TTE: left ventricular systolic function is normal with an ejection fraction of 54%  - Would not perform stress test while actively withdrawing. Also pain resolved. Low suspicion for cardiac at this time. Can consider outpatient eval.
resolved  - Troponin negative x3  - TTE: left ventricular systolic function is normal with an ejection fraction of 54%  - Would not perform stress test while actively withdrawing. Also pain resolved. Low suspicion for cardiac at this time. Can consider outpatient eval.
Pt reports intermittent left sided chest pain though will not describe it further  - troponin negative x3  - check TTE, f/u   - ASA 81 daily  - would not perform stress test while actively withdrawing  - continue tele monitoring  - RVP [ neg]
2/2 patient complained of L buttock pain. skin exam with small < 1cm x 1cm area of induration without fluctuance or erythema in the gluteal fold on the L buttock  - no area of fluctuance that would be amenable to drainage
Sinus tachycardia likely 2/2 acute alcohol withdrawal, however pt also complaining of intermittent chest pain  - s/p IVF   - urine tox positive for THC and Benzos  - improved
2/2 patient complained of L buttock pain. skin exam with small < 1cm x 1cm area of induration without fluctuance or erythema in the gluteal fold on the L buttock  - will monitor for now and treat with ibuprofen, if still complaining of pain tomorrow may consider starting bactrim  - no area of fluctuance that would be amenable to drainage
Sinus tachycardia likely 2/2 acute alcohol withdrawal, however pt also complaining of intermittent chest pain  - s/p IVF   - urine tox positive for THC and Benzos  - improved
2/2 patient complained of L buttock pain. skin exam with small < 1cm x 1cm area of induration without fluctuance or erythema in the gluteal fold on the L buttock  - will monitor for now and treat with ibuprofen, if still complaining of pain tomorrow may consider starting bactrim  - no area of fluctuance that would be amenable to drainage

## 2024-02-14 NOTE — PROGRESS NOTE ADULT - PROBLEM SELECTOR PROBLEM 5
Atypical chest pain
Need for prophylactic measure
Atypical chest pain
Atypical chest pain
Need for prophylactic measure
Atypical chest pain
Atypical chest pain
Need for prophylactic measure
Need for prophylactic measure
Atypical chest pain
Need for prophylactic measure
Need for prophylactic measure
Atypical chest pain
Need for prophylactic measure
Need for prophylactic measure
Transaminitis
Atypical chest pain
Transaminitis
Need for prophylactic measure
Atypical chest pain
Transaminitis
Transaminitis

## 2024-02-14 NOTE — PROGRESS NOTE ADULT - PROBLEM SELECTOR PLAN 2
Chronic neuropathic LE discomfort, suspect in setting of chronic long standing ETOH abuse.   - B12 nl  - on gabapentin to 600 TID, patient reports positive response, pt says neuropathy worse at night , better in daytime , can consider increasing gabapentin to 800 mg QHS, c/w 600 mg BID   - standing lidocaine patches as patient reports positive response  - patient re-evaluated by PT and patient able to ambulate 150ft, so no longer needs ELVIS. SW to discuss shelter options with patient; patient not interested in shelter  - patient given a list of substance abuse rehabs, [facility] 3 facilities, SW to assess availability   - patient medically stable for discharge Chronic neuropathic LE discomfort, suspect in setting of chronic long standing ETOH abuse.   - B12 nl  - on gabapentin to 600 TID, patient reports positive response, pt says neuropathy worse at night , better in daytime , increased  gabapentin to 800 mg QHS, c/w 600 mg BID   - standing lidocaine patches as patient reports positive response  - patient re-evaluated by PT and patient able to ambulate 150ft, so no longer needs ELVIS. SW to discuss shelter options with patient; patient not interested in shelter  - patient given a list of substance abuse rehabs, picked 3 facilities, SW to assess availability   - patient medically stable for discharge

## 2024-02-14 NOTE — PROGRESS NOTE ADULT - PROBLEM SELECTOR PLAN 1
Pt with significant alcohol abuse history, drinks 15-20 drinks per day, presented intoxicated and now in alcohol withdrawal. Has history of withdrawal but none previously at our hospital  - on high risk ativan taper, would have low threshold to increase dose or use phenobarb if symptoms worsening   - trend CIWA currently 0  - seizure precautions   - fall risk precautions  - multivitamin, folate and thiamine    Trial of gabapentin for suspected neuropathic pain of the LEs
Pt with significant alcohol abuse history, drinks 15-20 drinks per day, presented intoxicated and now in alcohol withdrawal. Has history of withdrawal but none previously at our hospital.  - s/p ativan taper  - symptomatically improved  - c/w multivitamin, folate, and thiamine  - encourage ETOH cessation
Pt with significant alcohol abuse history, drinks 15-20 drinks per day, presented intoxicated and now in alcohol withdrawal. Has history of withdrawal but none previously at our hospital.  - s/p ativan taper  - symptomatically improved  - seizure precautions   - fall risk precautions  - multivitamin, folate, and thiamine  - encourage etoh cessation  Pending DC to rehab facility.  Was denied, currently being appealed.  F/u CM
Pt with significant alcohol abuse history, drinks 15-20 drinks per day, presented intoxicated and now in alcohol withdrawal. Has history of withdrawal but none previously at our hospital.  - s/p ativan taper  - symptomatically improved  - c/w multivitamin, folate, and thiamine  - encourage ETOH cessation
Pt with significant alcohol abuse history, drinks 15-20 drinks per day, presented intoxicated and now in alcohol withdrawal. Has history of withdrawal but none previously at our hospital.  - s/p ativan taper  - symptomatically improved  - seizure precautions   - fall risk precautions  - multivitamin, folate, and thiamine  - encourage etoh cessation
Pt with significant alcohol abuse history, drinks 15-20 drinks per day, presented intoxicated and now in alcohol withdrawal. Has history of withdrawal but none previously at our hospital.  - s/p ativan taper  - symptomatically improved  - seizure precautions   - fall risk precautions  - multivitamin, folate, and thiamine  - encourage etoh cessation
Pt with significant alcohol abuse history, drinks 15-20 drinks per day, presented intoxicated and now in alcohol withdrawal. Has history of withdrawal but none previously at our hospital  - start high risk ativan taper, would have low threshold to increase dose or use phenobarb if symptoms worsening   - trend CIWA  - seizure precautions   - fall risk precautions  - multivitamin, folate and thiamine    Trial of gabapentin for suspected neuropathic pain of the LEs on 1/17
Pt with significant alcohol abuse history, drinks 15-20 drinks per day, presented intoxicated and now in alcohol withdrawal. Has history of withdrawal but none previously at our hospital  - s/p ativan taper  - symptomatically improved  - seizure precautions   - fall risk precautions  - multivitamin, folate, and thiamine  - encourage etoh cessation
Pt with significant alcohol abuse history, drinks 15-20 drinks per day, presented intoxicated and now in alcohol withdrawal. Has history of withdrawal but none previously at our hospital  - s/p ativan taper  - symptomatically improved  - seizure precautions   - fall risk precautions  - multivitamin, folate, and thiamine  - encourage etoh cessation
Pt with significant alcohol abuse history, drinks 15-20 drinks per day, presented intoxicated and now in alcohol withdrawal. Has history of withdrawal but none previously at our hospital.  - s/p ativan taper  - symptomatically improved  - seizure precautions   - fall risk precautions  - multivitamin, folate, and thiamine  - encourage etoh cessation
Pt with significant alcohol abuse history, drinks 15-20 drinks per day, presented intoxicated and now in alcohol withdrawal. Has history of withdrawal but none previously at our hospital.  - s/p ativan taper  - symptomatically improved  - seizure precautions   - fall risk precautions  - multivitamin, folate, and thiamine  - encourage etoh cessation  Pending DC to rehab facility.  Was denied, currently being appealed.  F/u CM
Pt with significant alcohol abuse history, drinks 15-20 drinks per day, presented intoxicated and now in alcohol withdrawal. Has history of withdrawal but none previously at our hospital  - on high risk ativan taper, would have low threshold to increase dose or use phenobarb if symptoms worsening   - trend CIWA currently 0  - seizure precautions   - fall risk precautions  - multivitamin, folate and thiamine
Pt with significant alcohol abuse history, drinks 15-20 drinks per day, presented intoxicated and now in alcohol withdrawal. Has history of withdrawal but none previously at our hospital  - s/p ativan taper  - symptomatically improved  - seizure precautions   - fall risk precautions  - multivitamin, folate, and thiamine  - encourage etoh cessation
Pt with significant alcohol abuse history, drinks 15-20 drinks per day, presented intoxicated and now in alcohol withdrawal. Has history of withdrawal but none previously at our hospital.  - s/p ativan taper  - symptomatically improved  - seizure precautions   - fall risk precautions  - multivitamin, folate, and thiamine  - encourage etoh cessation
Pt with significant alcohol abuse history, drinks 15-20 drinks per day, presented intoxicated and now in alcohol withdrawal. Has history of withdrawal but none previously at our hospital.  - s/p ativan taper  - symptomatically improved  - c/w multivitamin, folate, and thiamine  - encourage ETOH cessation
Pt with significant alcohol abuse history, drinks 15-20 drinks per day, presented intoxicated and now in alcohol withdrawal. Has history of withdrawal but none previously at our hospital  - on high risk ativan taper, would have low threshold to increase dose or use phenobarb if symptoms worsening   - trend CIWA currently 0  - seizure precautions   - fall risk precautions  - multivitamin, folate, and thiamine
Pt with significant alcohol abuse history, drinks 15-20 drinks per day, presented intoxicated and now in alcohol withdrawal. Has history of withdrawal but none previously at our hospital  - on high risk ativan taper, would have low threshold to increase dose or use phenobarb if symptoms worsening   - trend CIWA currently 0-2  - seizure precautions   - fall risk precautions  - multivitamin, folate and thiamine    Trial of gabapentin for suspected neuropathic pain of the LEs, will check b12 and folate, as well
Pt with significant alcohol abuse history, drinks 15-20 drinks per day, presented intoxicated and now in alcohol withdrawal. Has history of withdrawal but none previously at our hospital  - s/p ativan taper  - symptomatically improved  - seizure precautions   - fall risk precautions  - multivitamin, folate, and thiamine  - encourage etoh cessation
Pt with significant alcohol abuse history, drinks 15-20 drinks per day, presented intoxicated and now in alcohol withdrawal. Has history of withdrawal but none previously at our hospital.  - s/p ativan taper  - symptomatically improved  - c/w multivitamin, folate, and thiamine  - encourage ETOH cessation
Pt with significant alcohol abuse history, drinks 15-20 drinks per day, presented intoxicated and now in alcohol withdrawal. Has history of withdrawal but none previously at our hospital.  - s/p ativan taper  - symptomatically improved  - seizure precautions   - fall risk precautions  - multivitamin, folate, and thiamine  - encourage etoh cessation
Pt with significant alcohol abuse history, drinks 15-20 drinks per day, presented intoxicated and now in alcohol withdrawal. Has history of withdrawal but none previously at our hospital.  - s/p ativan taper  - symptomatically improved  - seizure precautions   - fall risk precautions  - multivitamin, folate, and thiamine  - encourage ETOH cessation
Pt with significant alcohol abuse history, drinks 15-20 drinks per day, presented intoxicated and now in alcohol withdrawal. Has history of withdrawal but none previously at our hospital.  - s/p ativan taper  - symptomatically improved  - c/w multivitamin, folate, and thiamine  - encourage ETOH cessation
Pt with significant alcohol abuse history, drinks 15-20 drinks per day, presented intoxicated and now in alcohol withdrawal. Has history of withdrawal but none previously at our hospital.  - s/p ativan taper  - symptomatically improved  - seizure precautions   - fall risk precautions  - multivitamin, folate, and thiamine  - encourage ETOH cessation  Pending DC to rehab facility.  Was denied, currently being appealed.  Appeal still undergoing
Pt with significant alcohol abuse history, drinks 15-20 drinks per day, presented intoxicated and now in alcohol withdrawal. Has history of withdrawal but none previously at our hospital.  - s/p ativan taper  - symptomatically improved  - seizure precautions   - fall risk precautions  - multivitamin, folate, and thiamine  - encourage etoh cessation
Pt with significant alcohol abuse history, drinks 15-20 drinks per day, presented intoxicated and now in alcohol withdrawal. Has history of withdrawal but none previously at our hospital.  - s/p ativan taper  - symptomatically improved  - c/w multivitamin, folate, and thiamine  - encourage ETOH cessation
Pt with significant alcohol abuse history, drinks 15-20 drinks per day, presented intoxicated and now in alcohol withdrawal. Has history of withdrawal but none previously at our hospital  - start high risk ativan taper, would have low threshold to increase dose or use phenobarb if symptoms worsening   - trend CIWA  - seizure precautions   - fall risk precautions  - multivitamin, folate and thiamine  - IVF

## 2024-02-14 NOTE — PROGRESS NOTE ADULT - ASSESSMENT
43M with history of ETOH abuse (drinks 15-20 drinks daily), prior history of alcohol withdrawal who presents with alcohol withdrawal and chest pain, currently on Ativan taper.
43M with history of ETOH abuse (drinks 15-20 drinks daily), prior history of alcohol withdrawal who presents with alcohol withdrawal and chest pain, s/p Ativan taper.
43M with history of ETOH abuse (drinks 15-20 drinks daily), prior history of alcohol withdrawal who presents with alcohol withdrawal and chest pain. F/u TTE [ ]. Completing scheduled ativan taper. 
43M with history of ETOH abuse (drinks 15-20 drinks daily), prior history of alcohol withdrawal who presents with alcohol withdrawal and chest pain, s/p Ativan taper.
43M with history of ETOH abuse (drinks 15-20 drinks daily), prior history of alcohol withdrawal who presents with alcohol withdrawal and chest pain, s/p Ativan taper.
43M with history of ETOH abuse (drinks 15-20 drinks daily), prior history of alcohol withdrawal who presents with alcohol withdrawal and chest pain. F/u TTE [ ]. Completing scheduled ativan taper. 
43M with history of ETOH abuse (drinks 15-20 drinks daily), prior history of alcohol withdrawal who presents with alcohol withdrawal and chest pain, s/p Ativan taper.
43M with history of ETOH abuse (drinks 15-20 drinks daily), prior history of alcohol withdrawal who presents with alcohol withdrawal and chest pain, currently on Ativan taper.
43M with history of ETOH abuse (drinks 15-20 drinks daily), prior history of alcohol withdrawal who presents with alcohol withdrawal and chest pain, s/p Ativan taper.
43M with history of ETOH abuse (drinks 15-20 drinks daily), prior history of alcohol withdrawal who presents with alcohol withdrawal and chest pain, s/p Ativan taper.
43M with history of ETOH abuse (drinks 15-20 drinks daily), prior history of alcohol withdrawal who presents with alcohol withdrawal and chest pain. F/u TTE [ ]. Completing scheduled ativan taper. 
43M with history of ETOH abuse (drinks 15-20 drinks daily), prior history of alcohol withdrawal who presents with alcohol withdrawal and chest pain, s/p Ativan taper.
43M with history of ETOH abuse (drinks 15-20 drinks daily), prior history of alcohol withdrawal who presents with alcohol withdrawal and chest pain

## 2024-02-14 NOTE — PROGRESS NOTE ADULT - PROBLEM SELECTOR PROBLEM 3
Sinus tachycardia
Atypical chest pain
Folliculitis
Sinus tachycardia
Atypical chest pain
Folliculitis
Atypical chest pain
Folliculitis
Atypical chest pain
Atypical chest pain
Folliculitis
Atypical chest pain
Folliculitis
Atypical chest pain
Folliculitis
Folliculitis
Sinus tachycardia
Sinus tachycardia
Folliculitis
Folliculitis
Atypical chest pain

## 2024-09-23 NOTE — DISCHARGE NOTE PROVIDER - HOSPITAL COURSE
42 years old male with h/o alcohol use present to ED with ? syncope. Patient reported unsteady gait, fell and hit his head. Denied any dizziness or LOC but per triage note, had witness syncope. Unreliable history of alcohol use. Patient reported only a few beer a week but with multiple ED visit in 2020 with alcohol intoxication with high blood alcohol level  Tachycardic to 123, afebrile, sat well at RA. No leukocytosis, Plt 90, K 3.4, Mg 1.5, phosphorus 1.9, Cr 0.99, hsTnT 6.6. UA appear dirty, Utox positive for THC, serum alcohol < 10. Flu/COVID negative. CXR image reviewed, no focal consolidation. CT head with no acute pathology    Admitted with unsteady gait, electrolytes disorder,     Vital Signs Last 24 Hrs  T(C): 36.5 (03 Feb 2023 12:21), Max: 36.8 (02 Feb 2023 15:10)  T(F): 97.7 (03 Feb 2023 12:21), Max: 98.3 (02 Feb 2023 18:03)  HR: 73 (03 Feb 2023 12:21) (73 - 94)  BP: 127/81 (03 Feb 2023 12:21) (113/87 - 127/81)  BP(mean): --  RR: 18 (03 Feb 2023 12:21) (16 - 19)  SpO2: 98% (03 Feb 2023 12:21) (96% - 98%)    Parameters below as of 03 Feb 2023 12:21  Patient On (Oxygen Delivery Method): room air         Problem/Plan - 1:  ·  Problem: Disorder of electrolytes.   ·  Plan: K 3.4, Mg 1.5, phosphorus 1.9  Replace with KCL, Mg sulfate, IV K phos  Monitor and replace serum electrolytes.     Problem/Plan - 2:  ·  Problem: Unsteady gait.   ·  Plan: Reported unsteady gait for several months  Slight dysmetria in exam  CT head with no acute pathology  Will get MRI brain  Check B12, folate  continue thiamine  PT evaluation.     Problem/Plan - 3:  ·  Problem: Alcohol withdrawal syndrome.   ·  Plan: Tachycardic, slight tremors  Low CIWA score at this moment  Will start with symptoms trigger oral ativan CIWA protocol  Monitor for DT   thiamine, folic acid, multivitamin  S/W consult- patient is homeless.     Problem/Plan - 4:  ·  Problem: Acute UTI.   ·  Plan: Ceftriaxone 1g daily  Follow up urine culture.     Problem/Plan - 5:  ·  Problem: Dehydration.   ·  Plan: IV fluid  Encourage oral intake.    Discharge time : 40 min   RETURN PARAMETERS DISCUSSED WITH PATIENT, PATIENT EXPRESSED UNDERSTANDING AND IS AGREEABLE. DISCUSSED WITH PATIENT ON REFRAINING FROM DRIVING UNTIL FOLLOW-UP/ CLEARED BY PMD. PATIENT EXPRESSED UNDERSTANDING.   Care plan and all findings were discussed in detail with patient.  All questions and concerns addressed   [facility] Rooming Progress Note      Are you taking medication as instructed? Yes    Do you have any medication questions/concerns since your last visit?  No    Has your pain has been interfering with your mood causing any sadness or depression? No    Ask only if 9B on the BPI is 8 or greater and PHQ-9 has not been previously completed:  a.  Over the past 2 weeks, how many days have you had little interest or pleasure in doing things?  N/A  b.  Over the past 2 weeks how many days have you been feeling down, depressed or hopeless? N/A    Did the patient bring a friend or family member with them into the room? Yes    If so, did the patient give explicit permission for the practitioner to discuss their healthcare in front of that friend/family member? Yes    PILL COUNT:     Pregabalin daily- picked up on 7/14/2024 # 41 tablet     Hydrocodone PRN- picked up on 7/14/2024 # 21 tablets left    42 years old male with h/o alcohol use present to ED with ? syncope. Patient reported unsteady gait, fell and hit his head. Denied any dizziness or LOC but per triage note, had witness syncope. Unreliable history of alcohol use. Patient reported only a few beer a week but with multiple ED visit in 2020 with alcohol intoxication with high blood alcohol level  Tachycardic to 123, afebrile, sat well at RA. No leukocytosis, Plt 90, K 3.4, Mg 1.5, phosphorus 1.9, Cr 0.99, hsTnT 6.6. UA appear dirty, Utox positive for THC, serum alcohol < 10. Flu/COVID negative. CXR image reviewed, no focal consolidation. CT head with no acute pathology    Admitted with unsteady gait, electrolytes disorder,     Vital Signs Last 24 Hrs  T(C): 36.5 (03 Feb 2023 12:21), Max: 36.8 (02 Feb 2023 15:10)  T(F): 97.7 (03 Feb 2023 12:21), Max: 98.3 (02 Feb 2023 18:03)  HR: 73 (03 Feb 2023 12:21) (73 - 94)  BP: 127/81 (03 Feb 2023 12:21) (113/87 - 127/81)  BP(mean): --  RR: 18 (03 Feb 2023 12:21) (16 - 19)  SpO2: 98% (03 Feb 2023 12:21) (96% - 98%)    Parameters below as of 03 Feb 2023 12:21  Patient On (Oxygen Delivery Method): room air  Gen: NAD, no increased WOB   HEENT: NCAT, PERRLA, EOMI, left periorbital bruise without skin break and no sig external swelling or pain on palpation   CVS: +S1/S2   Chest: CTAB   Abd: soft, NT, ND , +BS   Ext: no edema or calf tenderness b/l   Neuro: AOx2 (self, year/date), nonfocal exam, ambulating without difficulty , walked down the hallways without issues  because he was asking to go out to smoke    DISCHARGE DIAGNOSES:   S/P syncope, most likely vasovagal d/t dehydration   s/p IVF   Vit B12, folic acid and TSH wnl   flu/covid neg   CXR clear   EKG: sinus tachy   trop neg x 1   CT head unremarkable   refused MRI ,  patient has no neuro deficits at this time per my exam     ETOH dependence without w/d   Tobacco and THC dependence   patient agitated because he wants to go out to smoke and staff is not allowing him to leave and scoring him CIWA 13 for agitation. However patient is probably AOx2 at baseline from chronic ETOH use, he is NOT diaphoretic, tremulous, tachycardic, hypertensive or hallucinating. He is ambulating down the hallway without issues. Staff is fearful he might fall and doesn't want him walking which is making the patient even angrier. He states he stays with his friends when he asks but otherwise homeless and refusing shelter placement as he feels shelters have too much violence. I am able to have a normal conversation with the patient. He is not confused or confabulating. He accepted nicotine patch but unsure that he wants to stay hospitalized as he feels he needs to smoke outside.     actual CIWA is more like 3-5 for mainly agitation/anger   nicotine patch   I strongly encouraged the patient to quit smoking and ETOH use.  I outlined the extensive negative impact of smoking on quality of life and the numerous consequences of continued smoking including lung disease, heart attack, stroke, vascular disease, malignancy, increased mortality, etc.  The patient stated understanding.   He stated he is not willing to quit ETOH or smoking yet  thiamine, MVI, folic acid supplements        Disorder of electrolytes in setting of ETOH use   -- K 3.4, Mg 1.5, phosphorus 1.9  replaced     Thrombocytopenia ,. most likely d/t chronic ETOH use, no gross bleeding. stable.     UTI ruled out   d/c abx   patient has no urinary issues /complaints   UCx --NGTD     Abd US: hepatic steatosis. otherwise unremarkable        Discharge time : 40 min   RETURN PARAMETERS DISCUSSED WITH PATIENT, PATIENT EXPRESSED UNDERSTANDING AND IS AGREEABLE. DISCUSSED WITH PATIENT ON REFRAINING FROM DRIVING UNTIL FOLLOW-UP/ CLEARED BY PMD. PATIENT EXPRESSED UNDERSTANDING.   Care plan and all findings were discussed in detail with patient.  All questions and concerns addressed

## 2025-01-08 NOTE — DISCHARGE NOTE NURSING/CASE MANAGEMENT/SOCIAL WORK - NURSING SECTION COMPLETE
Called pt and informed him Jihan Manning NP has updated his Rx and is sending it to pharmacy.   
Patient/Caregiver provided printed discharge information.

## 2025-03-21 NOTE — PROGRESS NOTE ADULT - PROBLEM SELECTOR PROBLEM 1
Metabolic encephalopathy
Over-the-counter anti-inflammatories        
Metabolic encephalopathy
Metabolic encephalopathy